# Patient Record
Sex: MALE | Race: WHITE | NOT HISPANIC OR LATINO | Employment: OTHER | URBAN - METROPOLITAN AREA
[De-identification: names, ages, dates, MRNs, and addresses within clinical notes are randomized per-mention and may not be internally consistent; named-entity substitution may affect disease eponyms.]

---

## 2017-01-04 ENCOUNTER — TELEPHONE (OUTPATIENT)
Dept: MEDICAL GROUP | Facility: PHYSICIAN GROUP | Age: 77
End: 2017-01-04

## 2017-01-04 NOTE — TELEPHONE ENCOUNTER
Requested medical records regarding colonoscopy from Dr Montesinos. This was faxed via RightFax on 1/4/2017 3:30 PM to: [phone]    Requested medical records regarding PCP records and Immunizations from [facility]. This was faxed via theScorex on 1/4/2017 3:33 PM to: [phone]

## 2017-01-04 NOTE — Clinical Note
[facility]  Nanci Manuel M.D.   [address]  [place] [address]  Fax: [phone]  Authorization for Release/Disclosure of Protected Health Information   Name: ERASTO MALIN : 1940 SSN: [id number]   Address: [address] [place] [address] Phone:    [phone] (home)    I authorize the entity listed below to release/disclose the PHI below to [facility]/Nanci Manuel M.D.    Provider or Entity Name:    Dr Montesinos   Address   [facility]   Phone:  [phone]    Fax:  [phone]   Reason for request: continuity of care   Information to be released:    [ XXX  ] LAST COLONOSCOPY, including any PATH REPORT [  ] LAST DEXA  [  ] LAST MAMMOGRAM  [  ] LAST PAP [  ] RETINA EXAM REPORT  [  ] IMMUNIZATION RECORDS  [  ] Release all info      [  ] Check here and initial the line next to each item to release ALL health information INCLUDING  _____ Care and treatment for drug and / or alcohol abuse  _____ HIV testing, infection status, or AIDS  _____ Genetic Testing    DATES OF SERVICE OR TIME PERIOD TO BE DISCLOSED: _____________  I understand and acknowledge that:  * This Authorization may be revoked at any time by you in writing, except if your health information has already been used or disclosed.  * Your health information that will be used or disclosed as a result of you signing this authorization could be re-disclosed by the recipient. If this occurs, your re-disclosed health information may no longer be protected by State or Federal laws.  * You may refuse to sign this Authorization. Your refusal will not affect your ability to obtain treatment.  * This Authorization becomes effective upon signing and will  on (date) __________. If no date is indicated, this Authorization will  one (1) year from the signature date.    Name: Erasto Malin    Signature: For continuity of care.      Date: 2017

## 2017-01-04 NOTE — Clinical Note
[facility]  Nanci Manuel M.D.   [address]  [place] [address]  Fax: [phone]  Authorization for Release/Disclosure of Protected Health Information   Name: ERASTO CALHOUN : 1940 SSN: [id number]   Address: [address] [place] [address] Phone:    [phone] (home)    I authorize the entity listed below to release/disclose the PHI below to [facility]/Nanci Manuel M.D.    Provider or Entity Name:    [facility]    Address   City, State, [facility]   Phone:  [phone]    Fax:  [phone]   Reason for request: continuity of care   Information to be released:    [  ] LAST COLONOSCOPY, including any PATH REPORT [  ] LAST DEXA  [  ] LAST MAMMOGRAM  [  ] LAST PAP [  ] RETINA EXAM REPORT  [ XXX ] IMMUNIZATION RECORDS  [ XXX ] Release all info      [  ] Check here and initial the line next to each item to release ALL health information INCLUDING  _____ Care and treatment for drug and / or alcohol abuse  _____ HIV testing, infection status, or AIDS  _____ Genetic Testing    DATES OF SERVICE OR TIME PERIOD TO BE DISCLOSED: _____________  I understand and acknowledge that:  * This Authorization may be revoked at any time by you in writing, except if your health information has already been used or disclosed.  * Your health information that will be used or disclosed as a result of you signing this authorization could be re-disclosed by the recipient. If this occurs, your re-disclosed health information may no longer be protected by State or Federal laws.  * You may refuse to sign this Authorization. Your refusal will not affect your ability to obtain treatment.  * This Authorization becomes effective upon signing and will  on (date) __________. If no date is indicated, this Authorization will  one (1) year from the signature date.    Name: Erasto Calhoun    Signature: For continuity of care.      Date: 2017

## 2017-01-09 ENCOUNTER — OFFICE VISIT (OUTPATIENT)
Dept: MEDICAL GROUP | Facility: PHYSICIAN GROUP | Age: 77
End: 2017-01-09
Payer: MEDICARE

## 2017-01-09 VITALS
OXYGEN SATURATION: 99 % | WEIGHT: 172 LBS | DIASTOLIC BLOOD PRESSURE: 80 MMHG | SYSTOLIC BLOOD PRESSURE: 130 MMHG | BODY MASS INDEX: 23.3 KG/M2 | HEART RATE: 63 BPM | RESPIRATION RATE: 16 BRPM | TEMPERATURE: 98.2 F | HEIGHT: 72 IN

## 2017-01-09 DIAGNOSIS — E78.5 HYPERLIPIDEMIA, UNSPECIFIED HYPERLIPIDEMIA TYPE: ICD-10-CM

## 2017-01-09 DIAGNOSIS — Z00.00 ROUTINE HEALTH MAINTENANCE: ICD-10-CM

## 2017-01-09 PROCEDURE — 99204 OFFICE O/P NEW MOD 45 MIN: CPT | Performed by: INTERNAL MEDICINE

## 2017-01-09 RX ORDER — PRAVASTATIN SODIUM 40 MG
40 TABLET ORAL NIGHTLY
COMMUNITY
End: 2017-01-09 | Stop reason: SDUPTHER

## 2017-01-09 RX ORDER — PRAVASTATIN SODIUM 40 MG
40 TABLET ORAL DAILY
Qty: 90 TAB | Refills: 3 | Status: SHIPPED | OUTPATIENT
Start: 2017-01-09 | End: 2018-01-09 | Stop reason: SDUPTHER

## 2017-01-09 ASSESSMENT — PATIENT HEALTH QUESTIONNAIRE - PHQ9: CLINICAL INTERPRETATION OF PHQ2 SCORE: 0

## 2017-01-09 NOTE — ASSESSMENT & PLAN NOTE
Chronic condition. He is taking medicine as directed. Current side effects: none. 10-yr  Last labs was done in January 2016 in [place].

## 2017-01-09 NOTE — ASSESSMENT & PLAN NOTE
Pneumonia vaccination, Pneumovax and Prevnar done  Shingles vaccination done  Colonoscopy 2015  Tetanus booster unknown

## 2017-01-09 NOTE — PROGRESS NOTES
Erasto Calhoun is a 76 y.o. male here to establish care  HPI:  Patient's previous primary care provider was Dr. Maldonado. This is his initial visit with me. Moved to [place] last September from [place].     Hyperlipidemia  Chronic condition. He is taking medicine as directed. Current side effects: none. 10-yr  Last labs was done in January 2016 in [place].     History of lupus  Patient reports history of lupus-had a rash that was biopsied in 2012 which turned out to be lupus. Patient followed dermatologist and was placed on Plaquenil which was discontinued last year. He has had no recurrence in symptoms. No family history.    Routine health maintenance  Pneumonia vaccination, Pneumovax and Prevnar done  Shingles vaccination done  Colonoscopy 2015  Tetanus booster unknown      Every 3 days exercise 50-20 min with home gym/rowing machine. Lost a few pounds, but recently moved and under stress. States he follows a generally healthy. Splurges every now and then. Eat at home mostly. Loves steak chicken pork.     Current medicines (including changes today)  Current Outpatient Prescriptions   Medication Sig Dispense Refill   • Ascorbic Acid (VITAMIN C PO) Take  by mouth.     • multivitamin (THERAGRAN) Tab Take 1 Tab by mouth every day.     • Calcium Carb-Cholecalciferol (CALCIUM 600 + D PO) Take  by mouth.     • pravastatin (PRAVACHOL) 40 MG tablet Take 1 Tab by mouth every day at 6 PM. 90 Tab 3     No current facility-administered medications for this visit.     He  has no past medical history on file.  He  has past surgical history that includes other surgical procedure (Right, 1972) and cast application.  Social History   Substance Use Topics   • Smoking status: Former Smoker -- 1.00 packs/day for 4 years     Types: Cigarettes     Quit date: 01/01/1962   • Smokeless tobacco: Never Used   • Alcohol Use: 0.6 oz/week     0 Standard drinks or equivalent, 1 Shots of liquor per week      Comment: occasionally, cocktail once a  week.     Social History     Social History Narrative   • No narrative on file     Family History   Problem Relation Age of Onset   • Cancer Mother      skin cancer   • Stroke Mother 92   • Diabetes Mother    • Hypertension Father    • Heart Disease Father 55     father passed from heart attack   • No Known Problems Brother    • No Known Problems Brother    • Cancer Brother      throat ca   • Alcohol/Drug Brother      heavy drinker     Family Status   Relation Status Death Age   • Mother  96   • Father  55     MI   • Brother Alive    • Maternal Grandmother  91   • Maternal Grandfather  96   • Paternal Grandmother     • Paternal Grandfather     • Brother Alive    • Brother  60     ROS  Constitutional: Negative for fever, chills, weight loss and malaise/fatigue.   HENT: Negative for ear pain, nosebleeds, congestion, sore throat and neck pain.    Eyes: Negative for blurred vision.   Respiratory: Negative for cough, sputum production, shortness of breath and wheezing.    Cardiovascular: Negative for chest pain, palpitations, orthopnea and leg swelling.   Gastrointestinal: Negative for heartburn, nausea, vomiting and abdominal pain.   Genitourinary: Negative for dysuria, urgency and frequency.   Musculoskeletal: Negative for myalgias, back pain and joint pain.   Skin: Negative for rash and itching.   Neurological: Negative for dizziness, tingling, tremors, sensory change, focal weakness and headaches.   Endo/Heme/Allergies: Does not bruise/bleed easily.   Psychiatric/Behavioral: Negative for depression, anxiety, or memory loss.     All other systems reviewed and are negative except as in HPI.     Objective:     Blood pressure 130/80, pulse 63, temperature 36.8 °C (98.2 °F), resp. rate 16, height 1.829 m (6'), weight 78.019 kg (172 lb), SpO2 99 %. Body mass index is 23.32 kg/(m^2).  Physical Exam:  Constitutional: Alert, no distress.  Skin: Warm, dry, good turgor, no  rashes in visible areas.  Eye: Equal, round and reactive, conjunctiva clear, lids normal.  ENMT: Lips without lesions, good dentition, oropharynx clear.  Neck: Trachea midline, no masses, no thyromegaly.  Respiratory: Unlabored respiratory effort, lungs clear to auscultation, no wheezes, no ronchi.  Cardiovascular: Normal S1, S2, no murmur, no edema.  Abdomen: Soft, non-tender, no masses, no hepatosplenomegaly.  Psych: Alert and oriented x3, normal affect and mood.    Assessment and Plan:   The following treatment plan was discussed   1. Hyperlipidemia, unspecified hyperlipidemia type  LIPID PROFILE    pravastatin (PRAVACHOL) 40 MG tablet   2. History of lupus     3. Routine health maintenance       1. Recheck fasting lipid panel.  2. No current issues  3. Await previous records for updating vaccination status and determining if additional labs required. Discussed annual wellness visits.    Records requested.  Followup: Return in about 1 year (around 1/9/2018) for Annual wellness visit.         I have worked with consultants from the vendor as well as technical experts from [employer] to optimize the interface. I have made every reasonable attempt to correct obvious errors, but I expect that there are errors of grammar and possibly content that I did not discover before finalizing the note.

## 2017-01-09 NOTE — MR AVS SNAPSHOT
Erasto Calhoun   2017 9:05 AM   Office Visit   MRN: [id number]    Department:  [facility]   Dept Phone:  [phone]    Description:  Male : 1940   Provider:  Nanci Manuel D.O.           Reason for Visit     Establish Care New Pt/ est care    Medication Refill Pravastatin      Allergies as of 2017     No Known Allergies      You were diagnosed with     Hyperlipidemia, unspecified hyperlipidemia type   [8771386]       History of lupus   [015107]       Routine health maintenance   [689202]         Vital Signs     Blood Pressure Pulse Temperature Respirations Height Weight    130/80 mmHg 63 36.8 °C (98.2 °F) 16 1.829 m (6') 78.019 kg (172 lb)    Body Mass Index Oxygen Saturation Smoking Status             23.32 kg/m2 99% Former Smoker         Basic Information     Date Of Birth Sex Race Ethnicity Preferred Language    1940 Male White Non- English      Problem List              ICD-10-CM Priority Class Noted - Resolved    Hyperlipidemia E78.5   2017 - Present    History of lupus Z87.39   2017 - Present    Routine health maintenance Z00.00   2017 - Present      Health Maintenance        Date Due Completion Dates    IMM DTaP/Tdap/Td Vaccine (1 - Tdap) 1958 ---    IMM ZOSTER VACCINE 1999 ---    IMM PNEUMOCOCCAL 65+ (ADULT) LOW/MEDIUM RISK SERIES (1 of 2 - PCV13) 2004 ---    IMM INFLUENZA (1) 2016 ---    COLONOSCOPY 2025            Current Immunizations     13-VALENT PCV PREVNAR 2015    Pneumococcal polysaccharide vaccine (PPSV-23) 2010    SHINGLES VACCINE 2013      Below and/or attached are the medications your provider expects you to take. Review all of your home medications and newly ordered medications with your provider and/or pharmacist. Follow medication instructions as directed by your provider and/or pharmacist. Please keep your medication list with you and share with your provider. Update the information when  medications are discontinued, doses are changed, or new medications (including over-the-counter products) are added; and carry medication information at all times in the event of emergency situations     Allergies:  No Known Allergies          Medications  Valid as of: January 09, 2017 -  9:59 AM    Generic Name Brand Name Tablet Size Instructions for use    Ascorbic Acid   Take  by mouth.        Calcium Carb-Cholecalciferol   Take  by mouth.        Multiple Vitamin (Tab) THERAGRAN  Take 1 Tab by mouth every day.        Pravastatin Sodium (Tab) PRAVACHOL 40 MG Take 1 Tab by mouth every day at 6 PM.        .                 Medicines prescribed today were sent to:     [facility] [address] [place] [address], [place] - [address] [place] [address]    Phone: [phone] Fax: [phone]    Open 24 Hours?: No      Medication refill instructions:       If your prescription bottle indicates you have medication refills left, it is not necessary to call your provider’s office. Please contact your pharmacy and they will refill your medication.    If your prescription bottle indicates you do not have any refills left, you may request refills at any time through one of the following ways: The [email or web address] system (except Urgent Care), by calling your provider’s office, or by asking your pharmacy to contact your provider’s office with a refill request. Medication refills are processed only during regular business hours and may not be available until the next business day. Your provider may request additional information or to have a follow-up visit with you prior to refilling your medication.   *Please Note: Medication refills are assigned a new Rx number when refilled electronically. Your pharmacy may indicate that no refills were authorized even though a new prescription for the same medication is available at the pharmacy. Please request the medicine by name with the pharmacy before contacting your  provider for a refill.        Your To Do List     Future Labs/Procedures Complete By Expires    LIPID PROFILE  As directed 1/10/2018         [employer] Access Code: [email or web address]  Expires: 2/8/2017  9:59 AM    Your email address is not on file at [facility].  Email Addresses are required for you to sign up for [employer], please contact [phone] to verify your personal information and to provide your email address prior to attempting to register for [employer].    Erasto Lj  [address] [place] [address]    [employer]  A secure, online tool to manage your health information     [facility]’s [employer]® is a secure, online tool that connects you to your personalized health information from the privacy of your home -- day or night - making it very easy for you to manage your healthcare. Once the activation process is completed, you can even access your medical information using the [employer] pushpa, which is available for free in the Apple Pushpa store or Google Play store.     To learn more about [employer], visit [email or web address]    There are two levels of access available (as shown below):   My Chart Features  [facility] Primary Care Doctor [facility]  Specialists [facility]  Urgent  Care Non-[facility] Primary Care Doctor   Email your healthcare team securely and privately 24/7 X X X    Manage appointments: schedule your next appointment; view details of past/upcoming appointments X      Request prescription refills. X      View recent personal medical records, including lab and immunizations X X X X   View health record, including health history, allergies, medications X X X X   Read reports about your outpatient visits, procedures, consult and ER notes X X X X   See your discharge summary, which is a recap of your hospital and/or ER visit that includes your diagnosis, lab results, and care plan X X  X     How to register for [employer]:  Once your e-mail address has been verified, follow the following steps to sign up for  [employer].     1. Go to  [email or web address]  2. Click on the Sign Up Now box, which takes you to the New Member Sign Up page. You will need to provide the following information:  a. Enter your [employer] Access Code exactly as it appears at the top of this page. (You will not need to use this code after you’ve completed the sign-up process. If you do not sign up before the expiration date, you must request a new code.)   b. Enter your date of birth.   c. Enter your home email address.   d. Click Submit, and follow the next screen’s instructions.  3. Create a Ar[employer] ID. This will be your [employer]ist login ID and cannot be changed, so think of one that is secure and easy to remember.  4. Create a Ar[employer] password. You can change your password at any time.  5. Enter your Password Reset Question and Answer. This can be used at a later time if you forget your password.   6. Enter your e-mail address. This allows you to receive e-mail notifications when new information is available in [employer].  7. Click Sign Up. You can now view your health information.    For assistance activating your [employer] account, call [phone]

## 2017-01-09 NOTE — ASSESSMENT & PLAN NOTE
Patient reports history of lupus-had a rash that was biopsied in 2012 which turned out to be lupus. Patient followed dermatologist and was placed on Plaquenil which was discontinued last year. He has had no recurrence in symptoms. No family history.

## 2017-01-10 ENCOUNTER — HOSPITAL ENCOUNTER (OUTPATIENT)
Dept: LAB | Facility: MEDICAL CENTER | Age: 77
End: 2017-01-10
Attending: INTERNAL MEDICINE
Payer: MEDICARE

## 2017-01-10 DIAGNOSIS — E78.5 HYPERLIPIDEMIA, UNSPECIFIED HYPERLIPIDEMIA TYPE: ICD-10-CM

## 2017-01-10 LAB
CHOLEST SERPL-MCNC: 180 MG/DL (ref 100–199)
HDLC SERPL-MCNC: 59 MG/DL
LDLC SERPL CALC-MCNC: 103 MG/DL
TRIGL SERPL-MCNC: 92 MG/DL (ref 0–149)

## 2017-01-10 PROCEDURE — 36415 COLL VENOUS BLD VENIPUNCTURE: CPT

## 2017-01-10 PROCEDURE — 80061 LIPID PANEL: CPT

## 2017-01-12 ENCOUNTER — TELEPHONE (OUTPATIENT)
Dept: MEDICAL GROUP | Facility: PHYSICIAN GROUP | Age: 77
End: 2017-01-12

## 2017-01-12 NOTE — TELEPHONE ENCOUNTER
----- Message from Nanci Manuel D.O. sent at 1/12/2017 10:41 AM PST -----  Cholesterol overall well controlled.

## 2017-09-13 ENCOUNTER — PATIENT OUTREACH (OUTPATIENT)
Dept: HEALTH INFORMATION MANAGEMENT | Facility: OTHER | Age: 77
End: 2017-09-13

## 2017-09-13 NOTE — PROGRESS NOTES
Outcome: Left Message /  with steph    Please transfer to Patient Outreach Team at [phone] when patient returns call.    WebIZ Checked & Epic Updated:  yes    HealthConnect Verified: yes    Attempt # 1

## 2017-09-22 NOTE — PROGRESS NOTES
Attempt #:4    WebIZ Checked & Epic Updated: Yes  · WebIZ Recommendations: FLU and TDAP  · Is patient due for Tdap? YES. Patient was not notified of copay.  · Is patient due for Shingles? NO  HealthConnect Verified: yes  Verify PCP: yes    Communication Preference Obtained: yes     Review Care Team: yes    Annual Wellness Visit Scheduling  1. Scheduling Status:Scheduled       Care Gap Scheduling (Attempt to Schedule EACH Overdue Care Gap!)     Health Maintenance Due   Topic Date Due   • Annual Wellness Visit  1940   • IMM DTaP/Tdap/Td Vaccine (1 - Tdap) 09/13/1958   • IMM INFLUENZA (1) 09/01/2017        Scheduled patient for Annual Wellness Visit and Immunizations: FLU and TDAP       [employer] Activation: declined  [employer] Pushpa: no  Virtual Visits: no  Opt In to Text Messages: no

## 2017-10-04 ENCOUNTER — OFFICE VISIT (OUTPATIENT)
Dept: MEDICAL GROUP | Facility: PHYSICIAN GROUP | Age: 77
End: 2017-10-04
Payer: MEDICARE

## 2017-10-04 VITALS
RESPIRATION RATE: 16 BRPM | OXYGEN SATURATION: 96 % | TEMPERATURE: 98.2 F | BODY MASS INDEX: 22.89 KG/M2 | HEIGHT: 72 IN | HEART RATE: 74 BPM | WEIGHT: 169 LBS | DIASTOLIC BLOOD PRESSURE: 74 MMHG | SYSTOLIC BLOOD PRESSURE: 122 MMHG

## 2017-10-04 DIAGNOSIS — E78.2 MIXED HYPERLIPIDEMIA: ICD-10-CM

## 2017-10-04 DIAGNOSIS — Z23 NEED FOR VACCINATION: ICD-10-CM

## 2017-10-04 PROBLEM — Z00.00 ROUTINE HEALTH MAINTENANCE: Status: RESOLVED | Noted: 2017-01-09 | Resolved: 2017-10-04

## 2017-10-04 PROCEDURE — G0008 ADMIN INFLUENZA VIRUS VAC: HCPCS | Performed by: FAMILY MEDICINE

## 2017-10-04 PROCEDURE — 99214 OFFICE O/P EST MOD 30 MIN: CPT | Mod: 25 | Performed by: FAMILY MEDICINE

## 2017-10-04 PROCEDURE — 90662 IIV NO PRSV INCREASED AG IM: CPT | Performed by: FAMILY MEDICINE

## 2017-10-04 NOTE — PROGRESS NOTES
Subjective:     Chief Complaint   Patient presents with   • Establish Care       Erasto Calhoun is a 77 y.o. male here today to [facility] care with new provider. Patient is a former patient of Dr. Manuel who has changed offices.    Hyperlipidemia: Patient is taking pravastatin daily without muscle aches and pains.     No Known Allergies  Current medicines (including changes today)  Current Outpatient Prescriptions   Medication Sig Dispense Refill   • Ascorbic Acid (VITAMIN C PO) Take  by mouth.     • multivitamin (THERAGRAN) Tab Take 1 Tab by mouth every day.     • Calcium Carb-Cholecalciferol (CALCIUM 600 + D PO) Take  by mouth.     • pravastatin (PRAVACHOL) 40 MG tablet Take 1 Tab by mouth every day at 6 PM. 90 Tab 3     No current facility-administered medications for this visit.      Social History   Substance Use Topics   • Smoking status: Former Smoker     Packs/day: 1.00     Years: 4.00     Types: Cigarettes     Quit date: 1962   • Smokeless tobacco: Never Used   • Alcohol use 0.6 oz/week     1 Shots of liquor per week      Comment: occasionally, cocktail once a week.     Family Status   Relation Status   • Mother  at age 96   • Father  at age 55    MI   • Brother Alive   • Maternal Grandmother  at age 91   • Maternal Grandfather  at age 96   • Paternal Grandmother    • Paternal Grandfather    • Brother Alive   • Brother  at age 60     Family History   Problem Relation Age of Onset   • Cancer Mother      skin cancer   • Stroke Mother 92   • Diabetes Mother    • Hypertension Father    • Heart Disease Father 55     father passed from heart attack   • No Known Problems Brother    • No Known Problems Brother    • Cancer Brother      throat ca   • Alcohol/Drug Brother      heavy drinker     He    has a past medical history of Lupus (systemic lupus erythematosus) (CMS-HCC).        ROS   GEN: no weight loss, fevers, or chills  HEENT: no blurry vision or change in  vision, no ear pain, no difficulty swallowing, no throat pain, no runny nose, no nasal congestion  Resp: no shortness of breath, no cough  CV: no racing heart, no irregular beats, no chest pain  Abd: no nausea, no vomiting, no diarrhea, no constipation, no blood in stool, no dark stools, no incontinence  : no dysuria, no hematuria, no urinary incontinence, no increased frequency  MSK: no muscle aches, no joint pain, no limited motion  Neuro: no headaches, no dizziness, no LOC, no weakness, no numbness/tingling       Objective:     Blood pressure 122/74, pulse 74, temperature 36.8 °C (98.2 °F), resp. rate 16, height 1.829 m (6'), weight 76.7 kg (169 lb), SpO2 96 %. Body mass index is 22.92 kg/m².   Physical Exam:  Constitutional: Alert, no distress.  Skin: Warm, dry, good turgor, no rashes in visible areas.  Eye: Equal, round and reactive, conjunctiva clear, lids normal.  ENMT: Lips without lesions, oropharynx non-erythematous, no exudate, moist oral mucosa, bilateral tympanic membranes: No bulging, no retraction, no fluid, nonerythematous, positive light reflex, external auditory canals: Clear, scant cerumen, nonerythematous  Neck: Trachea midline, no masses, no thyromegaly. No cervical or supraclavicular lymphadenopathy. Full ROM  Respiratory: Unlabored respiratory effort, good air movement, lungs clear to auscultation, no wheezes, no ronchi.  Cardiovascular:RRR, +S1, S2, no murmur, no peripheral edema, pedal and radial pulses equal and intact bilat  Abdomen: Soft, non-tender, no masses, no hepatosplenomegaly.  MSK:5/5 muscle strength in upper extremities as well as lower extremity bilaterally  Psych: Alert and oriented, appropriate affect and mood.  Neuro: CN2-12 intact, no gross motor or sensory deficits      Assessment and Plan:   The following treatment plan was discussed    1. Mixed hyperlipidemia  Chronic: well controlled  - COMP METABOLIC PANEL; Future  - LIPID PROFILE; Future    2. Need for vaccination  I  discussed benefits and side effects of each vaccine with patient, and I answered all patient's questions about vaccines.  - INFLUENZA VACCINE, HIGH DOSE (65+ ONLY)      Followup: Return for Annual.    Please note that this dictation was created using voice recognition software. I have made every reasonable attempt to correct obvious errors, but I expect that there are errors of grammar and possibly content that I did not discover before finalizing the note.

## 2017-10-12 ENCOUNTER — HOSPITAL ENCOUNTER (OUTPATIENT)
Dept: LAB | Facility: MEDICAL CENTER | Age: 77
End: 2017-10-12
Attending: FAMILY MEDICINE
Payer: MEDICARE

## 2017-10-12 DIAGNOSIS — E78.2 MIXED HYPERLIPIDEMIA: ICD-10-CM

## 2017-10-12 LAB
ALBUMIN SERPL BCP-MCNC: 4.1 G/DL (ref 3.2–4.9)
ALBUMIN/GLOB SERPL: 1.4 G/DL
ALP SERPL-CCNC: 60 U/L (ref 30–99)
ALT SERPL-CCNC: 15 U/L (ref 2–50)
ANION GAP SERPL CALC-SCNC: 9 MMOL/L (ref 0–11.9)
AST SERPL-CCNC: 18 U/L (ref 12–45)
BILIRUB SERPL-MCNC: 2.3 MG/DL (ref 0.1–1.5)
BUN SERPL-MCNC: 26 MG/DL (ref 8–22)
CALCIUM SERPL-MCNC: 9.9 MG/DL (ref 8.5–10.5)
CHLORIDE SERPL-SCNC: 104 MMOL/L (ref 96–112)
CHOLEST SERPL-MCNC: 172 MG/DL (ref 100–199)
CO2 SERPL-SCNC: 25 MMOL/L (ref 20–33)
CREAT SERPL-MCNC: 0.79 MG/DL (ref 0.5–1.4)
GFR SERPL CREATININE-BSD FRML MDRD: >60 ML/MIN/1.73 M 2
GLOBULIN SER CALC-MCNC: 2.9 G/DL (ref 1.9–3.5)
GLUCOSE SERPL-MCNC: 92 MG/DL (ref 65–99)
HDLC SERPL-MCNC: 45 MG/DL
LDLC SERPL CALC-MCNC: 98 MG/DL
POTASSIUM SERPL-SCNC: 3.9 MMOL/L (ref 3.6–5.5)
PROT SERPL-MCNC: 7 G/DL (ref 6–8.2)
SODIUM SERPL-SCNC: 138 MMOL/L (ref 135–145)
TRIGL SERPL-MCNC: 145 MG/DL (ref 0–149)

## 2017-10-12 PROCEDURE — 80053 COMPREHEN METABOLIC PANEL: CPT

## 2017-10-12 PROCEDURE — 80061 LIPID PANEL: CPT

## 2017-10-12 PROCEDURE — 36415 COLL VENOUS BLD VENIPUNCTURE: CPT

## 2017-10-17 ENCOUNTER — OFFICE VISIT (OUTPATIENT)
Dept: MEDICAL GROUP | Facility: PHYSICIAN GROUP | Age: 77
End: 2017-10-17
Payer: MEDICARE

## 2017-10-17 VITALS
HEIGHT: 72 IN | OXYGEN SATURATION: 96 % | BODY MASS INDEX: 22.75 KG/M2 | WEIGHT: 168 LBS | RESPIRATION RATE: 16 BRPM | HEART RATE: 76 BPM | TEMPERATURE: 98.6 F | DIASTOLIC BLOOD PRESSURE: 72 MMHG | SYSTOLIC BLOOD PRESSURE: 118 MMHG

## 2017-10-17 DIAGNOSIS — Z00.00 MEDICARE ANNUAL WELLNESS VISIT, SUBSEQUENT: ICD-10-CM

## 2017-10-17 DIAGNOSIS — E78.2 MIXED HYPERLIPIDEMIA: ICD-10-CM

## 2017-10-17 DIAGNOSIS — E80.6 HYPERBILIRUBINEMIA: ICD-10-CM

## 2017-10-17 PROCEDURE — G0439 PPPS, SUBSEQ VISIT: HCPCS | Performed by: FAMILY MEDICINE

## 2017-10-17 ASSESSMENT — PATIENT HEALTH QUESTIONNAIRE - PHQ9: CLINICAL INTERPRETATION OF PHQ2 SCORE: 0

## 2017-10-17 NOTE — PROGRESS NOTES
Chief Complaint   Patient presents with   • Annual Wellness Visit         HPI:  Erasto Calhoun is a 77 y.o. here for Medicare Annual Wellness Visit     Patient Active Problem List    Diagnosis Date Noted   • Hyperlipidemia 01/09/2017       Current Outpatient Prescriptions   Medication Sig Dispense Refill   • Ascorbic Acid (VITAMIN C PO) Take  by mouth.     • multivitamin (THERAGRAN) Tab Take 1 Tab by mouth every day.     • Calcium Carb-Cholecalciferol (CALCIUM 600 + D PO) Take  by mouth.     • pravastatin (PRAVACHOL) 40 MG tablet Take 1 Tab by mouth every day at 6 PM. 90 Tab 3     No current facility-administered medications for this visit.             Current supplements as per medication list.       Allergies: Review of patient's allergies indicates no known allergies.    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     He  reports that he quit smoking about 55 years ago. His smoking use included Cigarettes. He has a 4.00 pack-year smoking history. He has never used smokeless tobacco. He reports that he drinks about 0.6 oz of alcohol per week . He reports that he does not use drugs.  Counseling given: Not Answered        DPA/Advanced Directive:  Patient does not have an Advanced Directive.  A packet and workshop information was given on Advanced Directives.      ROS:    Gait: Uses no assistive device   Ostomy: no   Other tubes: no   Amputations: no   Chronic oxygen use: no   Last eye exam:2 years ago.   : Denies incontinence.       Depression Screening    Little interest or pleasure in doing things?  0 - not at all  Feeling down, depressed , or hopeless? 0 - not at all  Patient Health Questionnaire Score: 0     If depressive symptoms identified deferred to follow up visit unless specifically addressed in assessment and plan.    Interpretation of PHQ-9 Total Score   Score Severity   1-4 No Depression   5-9 Mild Depression   10-14 Moderate Depression   15-19 Moderately Severe  Depression   20-27 Severe Depression    Screening for Cognitive Impairment    Three Minute Recall (apple, watch, radha)  3/3    Draw clock face with all 12 numbers set to the hand to show 10 minutes past 11 o'clock  1 5/5  Cognitive concerns identified deferred for follow up unless specifically addressed in assessment and plan.    Fall Risk Assessment    Has the patient had two or more falls in the last year or any fall with injury in the last year?  No    Safety Assessment    Throw rugs on floor.  Yes  Handrails on all stairs.  Yes  Good lighting in all hallways.  Yes  Difficulty hearing.  No  Patient counseled about all safety risks that were identified.    Functional Assessment ADLs    Are there any barriers preventing you from cooking for yourself or meeting nutritional needs?  No.    Are there any barriers preventing you from driving safely or obtaining transportation?  No.    Are there any barriers preventing you from using a telephone or calling for help?  No.    Are there any barriers preventing you from shopping?  No.    Are there any barriers preventing you from taking care of your own finances?  No.    Are there any barriers preventing you from managing your medications?  No.    Are currently engaging any exercise or physical activity?  No.       Health Maintenance Summary                Annual Wellness Visit Overdue 1940     IMM DTaP/Tdap/Td Vaccine Overdue 9/13/1958     COLONOSCOPY Next Due 6/18/2025      Done 6/18/2015 REFERRAL TO GI FOR COLONOSCOPY          Patient Care Team:  Aida Ferrer D.O. as PCP - General (Family Medicine)      Social History   Substance Use Topics   • Smoking status: Former Smoker     Packs/day: 1.00     Years: 4.00     Types: Cigarettes     Quit date: 1/1/1962   • Smokeless tobacco: Never Used   • Alcohol use 0.6 oz/week     1 Shots of liquor per week      Comment: occasionally, cocktail once a week.     Family History   Problem Relation Age of Onset   • Cancer  Mother      skin cancer   • Stroke Mother 92   • Diabetes Mother    • Hypertension Father    • Heart Disease Father 55     father passed from heart attack   • No Known Problems Brother    • No Known Problems Brother    • Cancer Brother      throat ca   • Alcohol/Drug Brother      heavy drinker     He  has a past medical history of Lupus (systemic lupus erythematosus) (CMS-HCC).   Past Surgical History:   Procedure Laterality Date   • OTHER SURGICAL PROCEDURE Right 1972    right heel injury   • CAST APPLICATION      climbing, collar bone injury (7th grade)       Exam:     Blood pressure 118/72, pulse 76, temperature 37 °C (98.6 °F), resp. rate 16, height 1.829 m (6'), weight 76.2 kg (168 lb), SpO2 96 %. Body mass index is 22.78 kg/m².    Hearing excellent.    Dentition upper and lower dentures  Alert, oriented in no acute distress.  Eye contact is good, speech goal directed, affect calm      Assessment and Plan. The following treatment and monitoring plan is recommended:    1. Medicare annual wellness visit, subsequent     2. Mixed hyperlipidemia     3. Hyperbilirubinemia  BILIRUBIN DIRECT    COMP METABOLIC PANEL     Repeat Bili in 4 weeks.   Services suggested: No services needed at this time  Health Care Screening: Age-appropriate preventive services Medicare covers discussed today and ordered if indicated.  Referrals offered: Community-based lifestyle interventions to reduce health risks and promote self-management and wellness, fall prevention, nutrition, physical activity, tobacco-use cessation, weight loss, and mental health services as per orders if indicated.    Discussion today about general wellness and lifestyle habits:    · Prevent falls and reduce trip hazards; Cautioned about securing or removing rugs.  · Have a working fire alarm and carbon monoxide detector;   · Engage in regular physical activity and social activities       Follow-up: Return for Annual.

## 2017-12-14 ENCOUNTER — HOSPITAL ENCOUNTER (OUTPATIENT)
Dept: LAB | Facility: MEDICAL CENTER | Age: 77
End: 2017-12-14
Attending: FAMILY MEDICINE
Payer: MEDICARE

## 2017-12-14 DIAGNOSIS — E80.6 HYPERBILIRUBINEMIA: ICD-10-CM

## 2017-12-14 LAB
ALBUMIN SERPL BCP-MCNC: 4.1 G/DL (ref 3.2–4.9)
ALBUMIN/GLOB SERPL: 1.5 G/DL
ALP SERPL-CCNC: 68 U/L (ref 30–99)
ALT SERPL-CCNC: 16 U/L (ref 2–50)
ANION GAP SERPL CALC-SCNC: 5 MMOL/L (ref 0–11.9)
AST SERPL-CCNC: 22 U/L (ref 12–45)
BILIRUB CONJ SERPL-MCNC: 0.6 MG/DL (ref 0.1–0.5)
BILIRUB INDIRECT SERPL-MCNC: 1.5 MG/DL (ref 0–1)
BILIRUB SERPL-MCNC: 2.1 MG/DL (ref 0.1–1.5)
BUN SERPL-MCNC: 18 MG/DL (ref 8–22)
CALCIUM SERPL-MCNC: 9.6 MG/DL (ref 8.5–10.5)
CHLORIDE SERPL-SCNC: 105 MMOL/L (ref 96–112)
CO2 SERPL-SCNC: 28 MMOL/L (ref 20–33)
CREAT SERPL-MCNC: 0.84 MG/DL (ref 0.5–1.4)
GFR SERPL CREATININE-BSD FRML MDRD: >60 ML/MIN/1.73 M 2
GLOBULIN SER CALC-MCNC: 2.7 G/DL (ref 1.9–3.5)
GLUCOSE SERPL-MCNC: 78 MG/DL (ref 65–99)
POTASSIUM SERPL-SCNC: 4 MMOL/L (ref 3.6–5.5)
PROT SERPL-MCNC: 6.8 G/DL (ref 6–8.2)
SODIUM SERPL-SCNC: 138 MMOL/L (ref 135–145)

## 2017-12-14 PROCEDURE — 80053 COMPREHEN METABOLIC PANEL: CPT

## 2017-12-14 PROCEDURE — 36415 COLL VENOUS BLD VENIPUNCTURE: CPT

## 2017-12-14 PROCEDURE — 82248 BILIRUBIN DIRECT: CPT

## 2017-12-15 DIAGNOSIS — E80.6 INDIRECT HYPERBILIRUBINEMIA: ICD-10-CM

## 2017-12-18 ENCOUNTER — TELEPHONE (OUTPATIENT)
Dept: MEDICAL GROUP | Facility: PHYSICIAN GROUP | Age: 77
End: 2017-12-18

## 2017-12-18 NOTE — TELEPHONE ENCOUNTER
----- Message from Aida Ferrer D.O. sent at 12/15/2017  6:40 PM PST -----  Please call pt to inform them that the results from recent testing in your to show elevated bilirubin. I recommend follow-up with a gastroenterologist. Referral has been created.  -Dr. Thurman

## 2018-01-09 ENCOUNTER — OFFICE VISIT (OUTPATIENT)
Dept: MEDICAL GROUP | Facility: PHYSICIAN GROUP | Age: 78
End: 2018-01-09
Payer: MEDICARE

## 2018-01-09 VITALS
HEART RATE: 87 BPM | TEMPERATURE: 97.8 F | BODY MASS INDEX: 22.48 KG/M2 | SYSTOLIC BLOOD PRESSURE: 140 MMHG | WEIGHT: 166 LBS | DIASTOLIC BLOOD PRESSURE: 88 MMHG | OXYGEN SATURATION: 97 % | HEIGHT: 72 IN

## 2018-01-09 DIAGNOSIS — F43.21 GRIEF REACTION: ICD-10-CM

## 2018-01-09 DIAGNOSIS — E78.2 MIXED HYPERLIPIDEMIA: ICD-10-CM

## 2018-01-09 DIAGNOSIS — E80.6 INDIRECT HYPERBILIRUBINEMIA: ICD-10-CM

## 2018-01-09 PROCEDURE — 99214 OFFICE O/P EST MOD 30 MIN: CPT | Performed by: FAMILY MEDICINE

## 2018-01-09 RX ORDER — PRAVASTATIN SODIUM 40 MG
40 TABLET ORAL DAILY
Qty: 90 TAB | Refills: 3 | Status: SHIPPED | OUTPATIENT
Start: 2018-01-09 | End: 2019-03-26 | Stop reason: SDUPTHER

## 2018-01-09 NOTE — PROGRESS NOTES
Subjective:     Chief Complaint   Patient presents with   • Results     lab results       Erasto Calhoun Jr. is a 77 y.o. male here today for elevated bilirubin.Patient was noted to have elevated bilirubin in October and again in December. Patient denies abdominal pain, nausea, vomiting, diarrhea, abdominal bloating, or yellowing of skin.  Patient has hyperlipidemia. He continues to take pravastatin daily without muscle aches or pains.      Patient's wife passed away recently due to necrotizing fasciitis. Patient reports good social support. He reports somewhat depressed mood but is dealing with loss well    No Known Allergies  Current medicines (including changes today)  Current Outpatient Prescriptions   Medication Sig Dispense Refill   • pravastatin (PRAVACHOL) 40 MG tablet Take 1 Tab by mouth every day at 6 PM. 90 Tab 3   • Ascorbic Acid (VITAMIN C PO) Take  by mouth.     • multivitamin (THERAGRAN) Tab Take 1 Tab by mouth every day.     • Calcium Carb-Cholecalciferol (CALCIUM 600 + D PO) Take  by mouth.       No current facility-administered medications for this visit.      Social History   Substance Use Topics   • Smoking status: Former Smoker     Packs/day: 1.00     Years: 4.00     Types: Cigarettes     Quit date: 1962   • Smokeless tobacco: Never Used   • Alcohol use 0.6 oz/week     1 Shots of liquor per week      Comment: occasionally, cocktail once a week.     Family Status   Relation Status   • Mother  at age 96   • Father  at age 55    MI   • Brother Alive   • Maternal Grandmother  at age 91   • Maternal Grandfather  at age 96   • Paternal Grandmother    • Paternal Grandfather    • Brother Alive   • Brother  at age 60     Family History   Problem Relation Age of Onset   • Cancer Mother      skin cancer   • Stroke Mother 92   • Diabetes Mother    • Hypertension Father    • Heart Disease Father 55     father passed from heart attack   • No  Known Problems Brother    • No Known Problems Brother    • Cancer Brother      throat ca   • Alcohol/Drug Brother      heavy drinker     He    has a past medical history of Lupus (systemic lupus erythematosus) (CMS-HCC).        ROS   GEN: no weight loss, fevers, or chills  Resp: no shortness of breath, no cough  CV: no racing heart, no irregular beats, no chest pain  Abd: no nausea, no vomiting, no diarrhea, no constipation, no blood in stool, no dark stools, no incontinence  MSK: no muscle aches, no joint pain, no limited motion  Neuro: no headaches, no dizziness, no LOC, no weakness, no numbness/tingling       Objective:     Blood pressure 140/88, pulse 87, temperature 36.6 °C (97.8 °F), height 1.829 m (6'), weight 75.3 kg (166 lb), SpO2 97 %. Body mass index is 22.51 kg/m².   Physical Exam:  Constitutional: Alert, no distress.  Skin: Warm, dry, good turgor, no rashes in visible areas.  Eye: Equal, round and reactive, conjunctiva clear, lids normal.  ENMT: Lips without lesions, dentures in place, oropharynx non-erythematous, no exudate, moist oral mucosa,  Neck: Trachea midline, no masses, no thyromegaly. No cervical or supraclavicular lymphadenopathy. Full ROM  Respiratory: Unlabored respiratory effort, good air movement, lungs clear to auscultation, no wheezes, no ronchi.  Cardiovascular:RRR, +S1, S2, no murmur, no peripheral edema, pedal and radial pulses equal and intact bilat  Abdomen: Soft, non-tender, no masses, no hepatosplenomegaly, positive bowel sounds in all 4 quadrants, negative Coleman's sign.  Psych: Alert and oriented, appropriate affect and mood, tearful when discussing my staff.  Neuro: no gross motor or sensory deficits      Assessment and Plan:   The following treatment plan was discussed  1. Indirect hyperbilirubinemia  Slight elevation in direct, elevation and indirect, uncertain etiology. Recommend follow-up with GI.    2. Mixed hyperlipidemia  The 10-year ASCVD risk score (Laisha LEONA Jr., et  al., 2013) is: 33.3%    Values used to calculate the score:      Age: 77 years      Sex: Male      Is Non- : No      Diabetic: No      Tobacco smoker: No      Systolic Blood Pressure: 140 mmHg      Is BP treated: No      HDL Cholesterol: 45 mg/dL      Total Cholesterol: 172 mg/dL    - pravastatin (PRAVACHOL) 40 MG tablet; Take 1 Tab by mouth every day at 6 PM.  Dispense: 90 Tab; Refill: 3    3. Grief reaction  Counseling offered. Patient declined. Patient has good social support from children.          Followup: Return in about 6 months (around 7/9/2018) for chronic conditions with Azul Cash.    Please note that this dictation was created using voice recognition software. I have made every reasonable attempt to correct obvious errors, but I expect that there are errors of grammar and possibly content that I did not discover before finalizing the note.

## 2018-05-15 ENCOUNTER — TELEPHONE (OUTPATIENT)
Dept: MEDICAL GROUP | Facility: PHYSICIAN GROUP | Age: 78
End: 2018-05-15

## 2018-05-15 NOTE — TELEPHONE ENCOUNTER
Future Appointments       Provider Department Center    5/16/2018 11:05 AM Azul Cash P.A.-C. [facility]        ESTABLISHED PATIENT PRE-VISIT PLANNING     Note: Patient will not be contacted if there is no indication to call.     1.  Reviewed notes from the last few office visits within the medical group: Yes    2.  If any orders were placed at last visit or intended to be done for this visit (i.e. 6 mos follow-up), do we have Results/Consult Notes?        •  Labs - Labs were not ordered at last office visit.       •  Imaging - Imaging was not ordered at last office visit.       •  Referrals - No referrals were ordered at last office visit.    3. Is this appointment scheduled as a Hospital Follow-Up? No    4.  Immunizations were updated in Epic using WebIZ?: Yes       •  Web Iz Recommendations: TDAP    5.  Patient is due for the following Health Maintenance Topics:   Health Maintenance Due   Topic Date Due   • IMM DTaP/Tdap/Td Vaccine (1 - Tdap) 09/13/1958       6.  MDX printed for Provider? YES    7.  Patient was informed to arrive 15 min prior to their scheduled appointment and bring in their medication bottles. ANTONIA

## 2018-05-16 ENCOUNTER — OFFICE VISIT (OUTPATIENT)
Dept: MEDICAL GROUP | Facility: PHYSICIAN GROUP | Age: 78
End: 2018-05-16
Payer: MEDICARE

## 2018-05-16 VITALS
SYSTOLIC BLOOD PRESSURE: 148 MMHG | DIASTOLIC BLOOD PRESSURE: 82 MMHG | HEART RATE: 70 BPM | HEIGHT: 72 IN | WEIGHT: 171 LBS | OXYGEN SATURATION: 96 % | TEMPERATURE: 98.2 F | BODY MASS INDEX: 23.16 KG/M2

## 2018-05-16 DIAGNOSIS — E80.6 INDIRECT HYPERBILIRUBINEMIA: ICD-10-CM

## 2018-05-16 DIAGNOSIS — E78.2 MIXED HYPERLIPIDEMIA: ICD-10-CM

## 2018-05-16 PROCEDURE — 99213 OFFICE O/P EST LOW 20 MIN: CPT | Performed by: PHYSICIAN ASSISTANT

## 2018-05-16 NOTE — ASSESSMENT & PLAN NOTE
Chronic issue for many years. Has done well on pravastatin 40 mg daily. Denies concerns. Last lipid panel in 10/2017.

## 2018-05-16 NOTE — ASSESSMENT & PLAN NOTE
Has had mildly elevated bilirubin levels on last 2 sets of labs. Previous PCP referred him to GI. Patient states he went and was told that levels were nothing to worry about given only mild elevation. No further testing was done. Patient continues to deny any abdominal pain, nausea/vomiting, or yellowing of the skin.

## 2018-05-16 NOTE — PROGRESS NOTES
Subjective:   Erasto Calhoun Jr. is a 77 y.o. male here today for follow-up on hyperlipidemia and hyperbilirubinemia. Is a new patient to me and is also establishing care today.    Previous PCP: Aida Thurman DO    HPI: Patient has the following current medical problems:    Hyperlipidemia  Chronic issue for many years. Has done well on pravastatin 40 mg daily. Denies concerns. Last lipid panel in 10/2017.    Indirect hyperbilirubinemia  Has had mildly elevated bilirubin levels on last 2 sets of labs. Previous PCP referred him to GI. Patient states he went and was told that levels were nothing to worry about given only mild elevation. No further testing was done. Patient continues to deny any abdominal pain, nausea/vomiting, or yellowing of the skin.       Current medicines (including changes today)  Current Outpatient Prescriptions   Medication Sig Dispense Refill   • pravastatin (PRAVACHOL) 40 MG tablet Take 1 Tab by mouth every day at 6 PM. 90 Tab 3   • Ascorbic Acid (VITAMIN C PO) Take  by mouth.     • multivitamin (THERAGRAN) Tab Take 1 Tab by mouth every day.     • Calcium Carb-Cholecalciferol (CALCIUM 600 + D PO) Take  by mouth.       No current facility-administered medications for this visit.      He  has a past medical history of Lupus (systemic lupus erythematosus) (HCC).    ROS  As per HPI.       Objective:     Blood pressure 148/82, pulse 70, temperature 36.8 °C (98.2 °F), height 1.829 m (6'), weight 77.6 kg (171 lb), SpO2 96 %. Body mass index is 23.19 kg/m².     Physical Exam:  Constitutional: Alert, well-appearing, no distress.  Skin: Warm, dry, good turgor, no rashes in visible areas. No jaundice.  Eye: Conjunctiva clear, lids normal.  ENMT: Lips without lesions, moist mucus membranes.      Assessment and Plan:   The following treatment plan was discussed    1. Mixed hyperlipidemia  Chronic issue, well-controlled on daily statin. Last lipid panel from 10/2017 reviewed which was normal.  Advised to continue pravastatin at current dose.     Cholesterol,Tot 172  100 - 199 mg/dL Final   Triglycerides 145  0 - 149 mg/dL Final   HDL 45  >=40 mg/dL Final   LDL 98  <100 mg/dL Final       2. Indirect hyperbilirubinemia  Chronic issue, unclear etiology but has been cleared by GI. Continues to be asymptomatic. Will continue to periodically monitor labs.      Followup: Return in about 5 months (around 10/16/2018) for Medicare AWV.    Azul Cash P.A.-C.

## 2018-06-07 NOTE — PROGRESS NOTES
Outcome: Left Message    Please transfer to Patient Outreach Team at [phone] when patient returns call.    HealthConnect Verified: yes    Attempt # 1

## 2018-06-08 NOTE — PROGRESS NOTES
1. Attempt #: 1    2. HealthConnect Verified: yes    3. Verify PCP: yes    4. Care Team Updated:       •   DME Company (gait device, O2, CPAP, etc.): N\A       •   Other Specialists (eye doctor, derm, GYN, cardiology, endo, etc): N\A    5.  Reviewed/Updated the following with patient:       •   Communication Preference Obtained? YES       •   Preferred Pharmacy? NO       •   Preferred Lab? NO       •   Family History (document living status of immediate family members and if + hx of cancer, diabetes, hypertension, hyperlipidemia, heart attack, stroke) NO    6. Blockboard Activation: declined    7. Blockboard Pushpa: no    8. Annual Wellness Visit Scheduling  Scheduling Status:Not Scheduled. Patient states they are not interested - Will call back when ready to schedule       9. Care Gap Scheduling (Attempt to Schedule EACH Overdue Care Gap!)     Health Maintenance Due   Topic Date Due   • IMM DTaP/Tdap/Td Vaccine (1 - Tdap) 09[birth year]13/1958        Patient declined  Annual Wellness Visit    10. Patient was NOT advised: “This is a free wellness visit. The provider will screen for medical conditions to help you stay healthy. If you have other concerns to address you may be asked to discuss these at a separate visit or there may be an additional fee.”     11. Patient was NOT informed to arrive 15 min prior to their scheduled appointment and bring in their medication bottles.

## 2018-10-08 ENCOUNTER — PATIENT OUTREACH (OUTPATIENT)
Dept: HEALTH INFORMATION MANAGEMENT | Facility: OTHER | Age: 78
End: 2018-10-08

## 2018-10-08 NOTE — PROGRESS NOTES
1. Attempt #:1    2. HealthConnect Verified: no    3. Verify PCP: yes    4. Review Care Team: yes    5. WebIZ Checked & Epic Updated: Yes  · WebIZ Recommendations: FLU, TDAP and SHINGRIX (Shingles)  · Is patient due for Tdap? YES. Patient was not notified of copay/out of pocket cost.  · Is patient due for Shingles? YES. Patient was not notified of copay/out of pocket cost.    6. Reviewed/Updated the following with patient:       •   Communication Preference Obtained? YES       •   Preferred Pharmacy? YES       •   Preferred Lab? YES       •   Family History (document living status of immediate family members and if + hx of cancer, diabetes, hypertension, hyperlipidemia, heart attack, stroke) YES. Was Abstract Encounter opened and chart updated? YES    7. Annual Wellness Visit Scheduling  · Scheduling Status:Scheduled     9. Care Gap Scheduling (Attempt to Schedule EACH Overdue Care Gap!)     Health Maintenance Due   Topic Date Due   • IMM DTaP/Tdap/Td Vaccine (1 - Tdap) 09[birth year]13/1959   • IMM ZOSTER VACCINES (2 of 3) 03/06/2013   • IMM INFLUENZA (1) 09/01/2018        Scheduled patient for Annual Wellness Visit     10. REHAPP Activation: declined    11. REHAPP Pushpa: no    12. Virtual Visits: no    13. Opt In to Text Messages: no    14. Patient was NOT advised: “This is a free wellness visit. The provider will screen for medical conditions to help you stay healthy. If you have other concerns to address you may be asked to discuss these at a separate visit or there may be an additional fee.”     15. Patient was informed to arrive 15 min prior to their scheduled appointment and bring in their medication bottles.

## 2018-10-22 ENCOUNTER — TELEPHONE (OUTPATIENT)
Dept: MEDICAL GROUP | Facility: PHYSICIAN GROUP | Age: 78
End: 2018-10-22

## 2018-10-22 NOTE — TELEPHONE ENCOUNTER
Future Appointments       Provider Department Center    10/30/2018 10:30 AM Azul Cash P.A.-C.; [facility]        ANNUAL WELLNESS VISIT PRE-VISIT PLANNING WITHOUT OUTREACH    1.  Reviewed note from last office visit with PCP: YES    2.  If any orders were placed at last visit, do we have Results/Consult Notes?        •  Labs - Labs were not ordered at last office visit..       •  Imaging - Imaging was not ordered at last office visit.       •  Referrals - No referrals were ordered at last office visit.    3.  Immunizations were updated in [facility] using WebIZ?: Yes       •  WebIZ Recommendations: FLU, TDAP and SHINGRIX (Shingles)        •  Is patient due for Tdap? YES. Patient was not notified of copay/out of pocket cost.       •  Is patient due for Shingles? YES. Patient was not notified of copay/out of pocket cost. not currently available in clinic     4.  Patient is due for the following Health Maintenance Topics:   Health Maintenance Due   Topic Date Due   • IMM DTaP/Tdap/Td Vaccine (1 - Tdap) 09[birth year]13/1959   • IMM ZOSTER VACCINES (2 of 3) 03/06/2013   • IMM INFLUENZA (1) 09/01/2018   • Annual Wellness Visit  10/18/2018       5.  Reviewed/Updated the following with patient:       •   Preferred Pharmacy? YES       •   Preferred Lab? YES       •   Preferred Communication? YES       •   Allergies? YES       •   Medications? YES. Was Abstract Encounter opened and chart updated? YES       •   Social History? YES. Was Abstract Encounter opened and chart updated? YES       •   Family History (document living status of immediate family members and if + hx of  cancer, diabetes, hypertension, hyperlipidemia, heart attack, stroke) YES. Was Abstract Encounter opened and chart updated? YES    6.  Care Team Updated:       •   DME Company (gait device, O2, CPAP, etc.): YES       •   Other Specialists (eye doctor, derm, GYN, cardiology, endo, etc): YES    7.  Patient has  the following Care Path diagnoses on Problem List:  NONE    8.  MDX printed and highlighted for Provider? NO compliant during visit 05/16/18    9.  Patient was advised: “This is a free wellness visit. The provider will screen for medical conditions to help you stay healthy. If you have other concerns to address you may be asked to discuss these at a separate visit or there may be an additional fee.”     10.  Patient was informed to arrive 15 min prior to their scheduled appointment and bring in their medication bottles.

## 2018-10-30 ENCOUNTER — OFFICE VISIT (OUTPATIENT)
Dept: MEDICAL GROUP | Facility: PHYSICIAN GROUP | Age: 78
End: 2018-10-30
Payer: MEDICARE

## 2018-10-30 VITALS
HEIGHT: 72 IN | HEART RATE: 56 BPM | OXYGEN SATURATION: 96 % | DIASTOLIC BLOOD PRESSURE: 90 MMHG | WEIGHT: 170 LBS | TEMPERATURE: 97.2 F | SYSTOLIC BLOOD PRESSURE: 140 MMHG | BODY MASS INDEX: 23.03 KG/M2

## 2018-10-30 DIAGNOSIS — Z23 NEED FOR VACCINATION: ICD-10-CM

## 2018-10-30 DIAGNOSIS — E78.2 MIXED HYPERLIPIDEMIA: ICD-10-CM

## 2018-10-30 DIAGNOSIS — Z00.00 MEDICARE ANNUAL WELLNESS VISIT, SUBSEQUENT: ICD-10-CM

## 2018-10-30 DIAGNOSIS — E80.6 INDIRECT HYPERBILIRUBINEMIA: ICD-10-CM

## 2018-10-30 PROCEDURE — 90662 IIV NO PRSV INCREASED AG IM: CPT | Performed by: PHYSICIAN ASSISTANT

## 2018-10-30 PROCEDURE — 90715 TDAP VACCINE 7 YRS/> IM: CPT | Performed by: PHYSICIAN ASSISTANT

## 2018-10-30 PROCEDURE — 90472 IMMUNIZATION ADMIN EACH ADD: CPT | Performed by: PHYSICIAN ASSISTANT

## 2018-10-30 PROCEDURE — G0439 PPPS, SUBSEQ VISIT: HCPCS | Performed by: PHYSICIAN ASSISTANT

## 2018-10-30 PROCEDURE — G0008 ADMIN INFLUENZA VIRUS VAC: HCPCS | Performed by: PHYSICIAN ASSISTANT

## 2018-10-30 ASSESSMENT — ACTIVITIES OF DAILY LIVING (ADL): BATHING_REQUIRES_ASSISTANCE: 0

## 2018-10-30 ASSESSMENT — PATIENT HEALTH QUESTIONNAIRE - PHQ9: CLINICAL INTERPRETATION OF PHQ2 SCORE: 0

## 2018-10-30 ASSESSMENT — ENCOUNTER SYMPTOMS: GENERAL WELL-BEING: GOOD

## 2018-10-30 NOTE — ASSESSMENT & PLAN NOTE
Chronic issue, stable. Has chronic mild elevation of indirect bilirubin. Previously evaluated by GI who recommended periodic monitoring. Last CMP in 12/2017. Due for re-check which I have ordered.

## 2018-10-30 NOTE — ASSESSMENT & PLAN NOTE
Chronic issue, well-controlled on daily pravastatin which he continues to tolerate well. Last lipid panel from 10/2017 reviewed and normal. Continue current management. Due for re-check of lipids which I have ordered.

## 2018-10-30 NOTE — PROGRESS NOTES
Chief Complaint   Patient presents with   • Annual Wellness Visit         HPI:  Erasto is a 78 y.o. here for Medicare Annual Wellness Visit. Is an established patient of mine.        Patient Active Problem List    Diagnosis Date Noted   • Indirect hyperbilirubinemia 12/15/2017   • Hyperlipidemia 01/09/2017       Current Outpatient Prescriptions   Medication Sig Dispense Refill   • pravastatin (PRAVACHOL) 40 MG tablet Take 1 Tab by mouth every day at 6 PM. 90 Tab 3   • Ascorbic Acid (VITAMIN C PO) Take  by mouth.     • multivitamin (THERAGRAN) Tab Take 1 Tab by mouth every day.     • Calcium Carb-Cholecalciferol (CALCIUM 600 + D PO) Take  by mouth.       No current facility-administered medications for this visit.         Patient is taking medications as noted in medication list.  Current supplements as per medication list.     Allergies: Patient has no known allergies.    Current social contact/activities: working, Visit with family and friends     Is patient current with immunizations? No, due for FLU, TDAP and SHINGRIX (Shingles). Patient is interested in receiving FLU and TDAP today.    He  reports that he quit smoking about 56 years ago. His smoking use included Cigarettes. He has a 4.00 pack-year smoking history. He has never used smokeless tobacco. He reports that he drinks about 0.6 oz of alcohol per week . He reports that he does not use drugs.  Counseling given: Not Answered        DPA/Advanced directive: Patient does not have an Advanced Directive.  A packet and workshop information was given on Advanced Directives.    ROS:    Gait: Uses no assistive device   Ostomy: No   Other tubes: No   Amputations: No   Chronic oxygen use No   Last eye exam 2 months ago    Wears hearing aids: No   : Denies any urinary leakage during the last 6 months    Depression Screening    Little interest or pleasure in doing things?  0 - not at all  Feeling down, depressed, or hopeless? 0 - not at all  Patient Health  Questionnaire Score: 0    If depressive symptoms identified deferred to follow up visit unless specifically addressed in assessment and plan.    Interpretation of PHQ-9 Total Score   Score Severity   1-4 No Depression   5-9 Mild Depression   10-14 Moderate Depression   15-19 Moderately Severe Depression   20-27 Severe Depression    Screening for Cognitive Impairment    Three Minute Recall (leader, season, table)  1/3 Leader season table   Stephan clock face with all 12 numbers and set the hands to show 10 past 11.  Yes 11:10 5/5  If cognitive concerns identified, deferred for follow up unless specifically addressed in assessment and plan.    Fall Risk Assessment    Has the patient had two or more falls in the last year or any fall with injury in the last year?  No  If fall risk identified, deferred for follow up unless specifically addressed in assessment and plan.    Safety Assessment    Throw rugs on floor.  Yes  Handrails on all stairs.  Yes  Good lighting in all hallways.  Yes  Difficulty hearing.  No  Patient counseled about all safety risks that were identified.    Functional Assessment ADLs    Are there any barriers preventing you from cooking for yourself or meeting nutritional needs?  No.    Are there any barriers preventing you from driving safely or obtaining transportation?  No.    Are there any barriers preventing you from using a telephone or calling for help?  No.    Are there any barriers preventing you from shopping?  No.    Are there any barriers preventing you from taking care of your own finances?  No.    Are there any barriers preventing you from managing your medications?  No.    Are there any barriers preventing you from showering, bathing or dressing yourself?  No.    Are you currently engaging in any exercise or physical activity?  Yes.  Walking   What is your perception of your health?  Good.    Health Maintenance Summary                IMM DTaP/Tdap/Td Vaccine Overdue 9/13/1959     IMM ZOSTER  VACCINES Overdue 3/6/2013      Done 1/9/2013 Imm Admin: Zoster Vaccine Live (ZVL) (Zostavax)    IMM INFLUENZA Overdue 9/1/2018      Done 10/4/2017 Imm Admin: Influenza Vaccine Adult     Annual Wellness Visit Overdue 10/18/2018      Done 10/17/2017 Visit Dx: Medicare annual wellness visit, subsequent    COLONOSCOPY Next Due 6/18/2025      Done 6/18/2015 REFERRAL TO GI FOR COLONOSCOPY          Patient Care Team:  Azul Cash P.A.-C. as PCP - General (Physician Assistant)    Social History   Substance Use Topics   • Smoking status: Former Smoker     Packs/day: 1.00     Years: 4.00     Types: Cigarettes     Quit date: 1/1/1962   • Smokeless tobacco: Never Used   • Alcohol use 0.6 oz/week     1 Shots of liquor per week      Comment: occasionally, cocktail once a week.     Family History   Problem Relation Age of Onset   • Cancer Mother         skin cancer   • Stroke Mother 92   • Diabetes Mother    • Hypertension Father    • Heart Disease Father 55        father passed from heart attack   • No Known Problems Brother    • No Known Problems Brother    • Cancer Brother         throat ca   • Alcohol/Drug Brother         heavy drinker     He  has a past medical history of Lupus (systemic lupus erythematosus) (HCC).   Past Surgical History:   Procedure Laterality Date   • OTHER SURGICAL PROCEDURE Right 1972    right heel injury   • CAST APPLICATION      climbing, collar bone injury (7th grade)           Exam:     Blood pressure 140/90, pulse (!) 56, temperature 36.2 °C (97.2 °F), height 1.829 m (6'), weight 77.1 kg (170 lb), SpO2 96 %. Body mass index is 23.06 kg/m².    Hearing good.    Dentition good - full upper dentures, partial lower dentures  Alert, oriented in no acute distress.  Eye contact is good, speech goal directed, affect calm  Neck supple and without adenopathy or masses  Normal S1, S2, RRR  Lungs CTA bilat      Assessment and Plan. The following treatment and monitoring plan is recommended:       Hyperlipidemia  Chronic issue, well-controlled on daily pravastatin which he continues to tolerate well. Last lipid panel from 10/2017 reviewed and normal. Continue current management. Due for re-check of lipids which I have ordered.    Indirect hyperbilirubinemia  Chronic issue, stable. Has chronic mild elevation of indirect bilirubin. Previously evaluated by GI who recommended periodic monitoring. Last CMP in 12/2017. Due for re-check which I have ordered.        Services suggested: No services needed at this time  Health Care Screening recommendations as per orders if indicated.  Referrals offered: PT/OT/Nutrition counseling/Behavioral Health/Smoking cessation as per orders if indicated.    Discussion today about general wellness and lifestyle habits:    · Prevent falls and reduce trip hazards; Cautioned about securing or removing rugs.  · Have a working fire alarm and carbon monoxide detector;   · Engage in regular physical activity and social activities       Follow-up: Return in about 1 year (around 10/30/2019) for AWV.     Azul Cash P.A.-C.

## 2019-01-03 ENCOUNTER — HOSPITAL ENCOUNTER (OUTPATIENT)
Dept: LAB | Facility: MEDICAL CENTER | Age: 79
End: 2019-01-03
Attending: PHYSICIAN ASSISTANT
Payer: MEDICARE

## 2019-01-03 DIAGNOSIS — E80.6 INDIRECT HYPERBILIRUBINEMIA: ICD-10-CM

## 2019-01-03 DIAGNOSIS — E78.2 MIXED HYPERLIPIDEMIA: ICD-10-CM

## 2019-01-03 LAB
ALBUMIN SERPL BCP-MCNC: 4.1 G/DL (ref 3.2–4.9)
ALBUMIN/GLOB SERPL: 1.5 G/DL
ALP SERPL-CCNC: 77 U/L (ref 30–99)
ALT SERPL-CCNC: 16 U/L (ref 2–50)
ANION GAP SERPL CALC-SCNC: 8 MMOL/L (ref 0–11.9)
AST SERPL-CCNC: 20 U/L (ref 12–45)
BILIRUB SERPL-MCNC: 1.6 MG/DL (ref 0.1–1.5)
BUN SERPL-MCNC: 29 MG/DL (ref 8–22)
CALCIUM SERPL-MCNC: 10.1 MG/DL (ref 8.5–10.5)
CHLORIDE SERPL-SCNC: 104 MMOL/L (ref 96–112)
CHOLEST SERPL-MCNC: 163 MG/DL (ref 100–199)
CO2 SERPL-SCNC: 25 MMOL/L (ref 20–33)
CREAT SERPL-MCNC: 0.94 MG/DL (ref 0.5–1.4)
GLOBULIN SER CALC-MCNC: 2.8 G/DL (ref 1.9–3.5)
GLUCOSE SERPL-MCNC: 104 MG/DL (ref 65–99)
HDLC SERPL-MCNC: 45 MG/DL
LDLC SERPL CALC-MCNC: 95 MG/DL
POTASSIUM SERPL-SCNC: 4.1 MMOL/L (ref 3.6–5.5)
PROT SERPL-MCNC: 6.9 G/DL (ref 6–8.2)
SODIUM SERPL-SCNC: 137 MMOL/L (ref 135–145)
TRIGL SERPL-MCNC: 113 MG/DL (ref 0–149)

## 2019-01-03 PROCEDURE — 80061 LIPID PANEL: CPT

## 2019-01-03 PROCEDURE — 36415 COLL VENOUS BLD VENIPUNCTURE: CPT

## 2019-01-03 PROCEDURE — 80053 COMPREHEN METABOLIC PANEL: CPT

## 2019-03-12 ENCOUNTER — TELEPHONE (OUTPATIENT)
Dept: MEDICAL GROUP | Facility: PHYSICIAN GROUP | Age: 79
End: 2019-03-12

## 2019-03-12 NOTE — TELEPHONE ENCOUNTER
Future Appointments       Provider Department Center    3/13/2019 9:45 AM Azul Cash P.A.-C. [facility]        ESTABLISHED PATIENT PRE-VISIT PLANNING     Patient was NOT contacted to complete PVP.     Note: Patient will not be contacted if there is no indication to call.     1.  Reviewed notes from the last few office visits within the medical group: Yes    2.  If any orders were placed at last visit or intended to be done for this visit (i.e. 6 mos follow-up), do we have Results/Consult Notes?        •  Labs - Labs ordered, completed on 01/03/2019 and results are in chart.         •  Imaging - Imaging was not ordered at last office visit.       •  Referrals - No referrals were ordered at last office visit.    3. Is this appointment scheduled as a Hospital Follow-Up? No    4.  Immunizations were updated in [facility] using WebIZ?: Yes       •  Web Iz Recommendations: TD, VARICELLA (Chicken Pox)  and SHINGRIX (Shingles)    5.  Patient is due for the following Health Maintenance Topics:   Health Maintenance Due   Topic Date Due   • IMM ZOSTER VACCINES (2 of 3) 03/06/2013       6. Orders for overdue Health Maintenance topics pended in Pre-Charting? NO Shingles vaccine not available in clinic    7.  AHA (MDX) form printed for Provider? YES    8.  Patient was informed to arrive 15 min prior to their scheduled appointment and bring in their medication bottles.

## 2019-03-13 ENCOUNTER — OFFICE VISIT (OUTPATIENT)
Dept: MEDICAL GROUP | Facility: PHYSICIAN GROUP | Age: 79
End: 2019-03-13
Payer: MEDICARE

## 2019-03-13 VITALS
BODY MASS INDEX: 23.03 KG/M2 | DIASTOLIC BLOOD PRESSURE: 78 MMHG | WEIGHT: 170 LBS | HEART RATE: 74 BPM | HEIGHT: 72 IN | TEMPERATURE: 98.6 F | SYSTOLIC BLOOD PRESSURE: 130 MMHG | OXYGEN SATURATION: 96 %

## 2019-03-13 DIAGNOSIS — J06.9 ACUTE UPPER RESPIRATORY INFECTION: ICD-10-CM

## 2019-03-13 PROCEDURE — 99214 OFFICE O/P EST MOD 30 MIN: CPT | Performed by: PHYSICIAN ASSISTANT

## 2019-03-13 PROCEDURE — 8041 PR SCP AHA: Performed by: PHYSICIAN ASSISTANT

## 2019-03-13 RX ORDER — DOXYCYCLINE HYCLATE 100 MG
100 TABLET ORAL 2 TIMES DAILY
Qty: 14 TAB | Refills: 0 | Status: SHIPPED | OUTPATIENT
Start: 2019-03-13 | End: 2019-03-20

## 2019-03-13 ASSESSMENT — PATIENT HEALTH QUESTIONNAIRE - PHQ9: CLINICAL INTERPRETATION OF PHQ2 SCORE: 0

## 2019-03-13 NOTE — PATIENT INSTRUCTIONS
"Upper Respiratory Infection, Adult  Most upper respiratory infections (URIs) are a viral infection of the air passages leading to the lungs. A URI affects the nose, throat, and upper air passages. The most common type of URI is nasopharyngitis and is typically referred to as \"the common cold.\"  URIs run their course and usually go away on their own. Most of the time, a URI does not require medical attention, but sometimes a bacterial infection in the upper airways can follow a viral infection. This is called a secondary infection. Sinus and middle ear infections are common types of secondary upper respiratory infections.  Bacterial pneumonia can also complicate a URI. A URI can worsen asthma and chronic obstructive pulmonary disease (COPD). Sometimes, these complications can require emergency medical care and may be life threatening.  What are the causes?  Almost all URIs are caused by viruses. A virus is a type of germ and can spread from one person to another.  What increases the risk?  You may be at risk for a URI if:  · You smoke.  · You have chronic heart or lung disease.  · You have a weakened defense (immune) system.  · You are very young or very old.  · You have nasal allergies or asthma.  · You work in crowded or poorly ventilated areas.  · You work in health care facilities or schools.  What are the signs or symptoms?  Symptoms typically develop 2-3 days after you come in contact with a cold virus. Most viral URIs last 7-10 days. However, viral URIs from the influenza virus (flu virus) can last 14-18 days and are typically more severe. Symptoms may include:  · Runny or stuffy (congested) nose.  · Sneezing.  · Cough.  · Sore throat.  · Headache.  · Fatigue.  · Fever.  · Loss of appetite.  · Pain in your forehead, behind your eyes, and over your cheekbones (sinus pain).  · Muscle aches.  How is this diagnosed?  Your health care provider may diagnose a URI by:  · Physical exam.  · Tests to check that your " symptoms are not due to another condition such as:  ¨ Strep throat.  ¨ Sinusitis.  ¨ Pneumonia.  ¨ Asthma.  How is this treated?  A URI goes away on its own with time. It cannot be cured with medicines, but medicines may be prescribed or recommended to relieve symptoms. Medicines may help:  · Reduce your fever.  · Reduce your cough.  · Relieve nasal congestion.  Follow these instructions at home:  · Take medicines only as directed by your health care provider.  · Gargle warm saltwater or take cough drops to comfort your throat as directed by your health care provider.  · Use a warm mist humidifier or inhale steam from a shower to increase air moisture. This may make it easier to breathe.  · Drink enough fluid to keep your urine clear or pale yellow.  · Eat soups and other clear broths and maintain good nutrition.  · Rest as needed.  · Return to work when your temperature has returned to normal or as your health care provider advises. You may need to stay home longer to avoid infecting others. You can also use a face mask and careful hand washing to prevent spread of the virus.  · Increase the usage of your inhaler if you have asthma.  · Do not use any tobacco products, including cigarettes, chewing tobacco, or electronic cigarettes. If you need help quitting, ask your health care provider.  How is this prevented?  The best way to protect yourself from getting a cold is to practice good hygiene.  · Avoid oral or hand contact with people with cold symptoms.  · Wash your hands often if contact occurs.  There is no clear evidence that vitamin C, vitamin E, echinacea, or exercise reduces the chance of developing a cold. However, it is always recommended to get plenty of rest, exercise, and practice good nutrition.  Contact a health care provider if:  · You are getting worse rather than better.  · Your symptoms are not controlled by medicine.  · You have chills.  · You have worsening shortness of breath.  · You have brown  or red mucus.  · You have yellow or brown nasal discharge.  · You have pain in your face, especially when you bend forward.  · You have a fever.  · You have swollen neck glands.  · You have pain while swallowing.  · You have white areas in the back of your throat.  Get help right away if:  · You have severe or persistent:  ¨ Headache.  ¨ Ear pain.  ¨ Sinus pain.  ¨ Chest pain.  · You have chronic lung disease and any of the following:  ¨ Wheezing.  ¨ Prolonged cough.  ¨ Coughing up blood.  ¨ A change in your usual mucus.  · You have a stiff neck.  · You have changes in your:  ¨ Vision.  ¨ Hearing.  ¨ Thinking.  ¨ Mood.  This information is not intended to replace advice given to you by your health care provider. Make sure you discuss any questions you have with your health care provider.  Document Released: 06/13/2002 Document Revised: 08/20/2017 Document Reviewed: 03/25/2015  [employer] Interactive Patient Education © 2017 [employer]

## 2019-03-13 NOTE — PROGRESS NOTES
Subjective:   Erasto Calhoun Jr. is a 78 y.o. male here today for cold symptoms. Is an established patient of mine.    HPI:          Current medicines (including changes today)  Current Outpatient Prescriptions   Medication Sig Dispense Refill   • pravastatin (PRAVACHOL) 40 MG tablet Take 1 Tab by mouth every day at 6 PM. 90 Tab 3   • Ascorbic Acid (VITAMIN C PO) Take  by mouth.     • multivitamin (THERAGRAN) Tab Take 1 Tab by mouth every day.     • Calcium Carb-Cholecalciferol (CALCIUM 600 + D PO) Take  by mouth.       No current facility-administered medications for this visit.      He  has a past medical history of Lupus (systemic lupus erythematosus) (HCC).    ROS  {[id number]}       Objective:     Blood pressure 130/78, pulse 74, temperature 37 °C (98.6 °F), height 1.829 m (6'), weight 77.1 kg (170 lb), SpO2 96 %. Body mass index is 23.06 kg/m².     Physical Exam:  Constitutional: Alert, no distress.  Skin: Warm, dry, good turgor, no rashes in visible areas.  Eye: Pupils are equal and round, conjunctiva clear, lids normal.  ENMT: Lips without lesions, moist mucus membranes.  Neck: No masses. No submandibular or cervical lymphadenopathy.  Respiratory: Unlabored respiratory effort, lungs clear to auscultation, no wheezes, no rhonchi.  Cardiovascular: Normal S1, S2, no murmur, no lower extremity edema.      Assessment and Plan:   The following treatment plan was discussed    There are no diagnoses linked to this encounter.    Followup: No Follow-up on file.    Azul Cash P.A.-C.

## 2019-03-13 NOTE — PROGRESS NOTES
Subjective:     Erasto Calhoun Jr. is a 78 y.o. male here today for cold symptoms and Annual Health Assessment. Is an established patient of mine.    HPI:    Patient presents today with concern for ongoing upper respiratory symptoms. He complains of sore throat, sinus congestion, runny nose, and productive cough. Onset was three weeks ago. A few days ago, started noticing drainage of right eye. This morning, noticed same thing on left eye.   Does comment that he feels a bit better this week, but still having significant symptoms. He denies ear pain, SOB/difficulty breathing, chest tightness/pain, or wheezing.  Denies any fever, chills, body aches. Has taken 4-5 OTC medications without significant improvement.      Health Maintenance Summary                IMM ZOSTER VACCINES Overdue 3/6/2013      Done 1/9/2013 Imm Admin: Zoster Vaccine Live (ZVL) (Zostavax)    Annual Wellness Visit Next Due 10/31/2019      Done 10/30/2018 Visit Dx: Medicare annual wellness visit, subsequent     Patient has more history with this topic...    COLONOSCOPY Next Due 6/18/2025      Done 6/18/2015 REFERRAL TO GI FOR COLONOSCOPY    IMM DTaP/Tdap/Td Vaccine Next Due 10/30/2028      Done 10/30/2018 Imm Admin: Tdap Vaccine           Annual Health Assessment Questions:     1.  Are you currently engaging in any exercise or physical activity? No - only because he's currently sick. Normally quite active.    2.  How would you describe your mood or emotional well-being today? fair    3.  Have you had any falls in the last year? No    4.  Have you noticed any problems with your balance or had difficulty walking? No    5.  In the last six months have you experienced any leakage of urine? No    6. DPA/Advanced Directive: Patient does not have an Advanced Directive.  A packet and workshop information was given on Advanced Directives.      Current medicines (including changes today)  Current Outpatient Prescriptions   Medication Sig Dispense  Refill   • doxycycline (VIBRAMYCIN) 100 MG Tab Take 1 Tab by mouth 2 times a day for 7 days. 14 Tab 0   • pravastatin (PRAVACHOL) 40 MG tablet Take 1 Tab by mouth every day at 6 PM. 90 Tab 3   • Ascorbic Acid (VITAMIN C PO) Take  by mouth.     • multivitamin (THERAGRAN) Tab Take 1 Tab by mouth every day.     • Calcium Carb-Cholecalciferol (CALCIUM 600 + D PO) Take  by mouth.       No current facility-administered medications for this visit.        He  has a past medical history of Lupus (systemic lupus erythematosus) (HCC).    Patient has no known allergies.    He  reports that he quit smoking about 57 years ago. His smoking use included Cigarettes. He has a 4.00 pack-year smoking history. He has never used smokeless tobacco. He reports that he drinks about 0.6 oz of alcohol per week . He reports that he does not use drugs.  Counseling given: Not Answered      ROS   As per HPI.       Objective:     Physical Exam:  Blood pressure 130/78, pulse 74, temperature 37 °C (98.6 °F), height 1.829 m (6'), weight 77.1 kg (170 lb), SpO2 96 %. Body mass index is 23.06 kg/m².     Constitutional: Alert, well-appearing elderly male in no distress.  Skin: Warm, dry, good turgor, no rashes in visible areas.  Eye: Equal, round and reactive, conjunctiva clear, lids normal.  ENMT: External ear canals are clear without erythema, edema, or drainage.  Tympanic membranes are pearly gray bilaterally and without injection or bulging.  Nasal turbinates appear noninflamed, noninjected.  There is mild clear-white rhinorrhea.  Lips without lesions, good dentition, oropharynx clear.  No pharyngeal erythema.  There is visible white postnasal drainage.  Neck: Trachea midline, no masses, no thyromegaly. No submandibular or anterior cervical lymphadenopathy.  Respiratory: Unlabored respiratory effort, SPO2 96% on room air.  Lungs clear to auscultation, no wheezes, no rhonchi.  Cardiovascular: Normal S1, S2, no murmur.  Psych: Alert and oriented x3,  normal affect and mood.      Assessment and Plan:     1. Acute upper respiratory infection  New problem, uncontrolled.  History and exam are consistent with acute URI.  Given longevity of symptoms with minimal improvement, will treat with antibiotics.  I have prescribed doxycycline which he is to take twice daily for a week.  Also recommend that he start over-the-counter saline nasal spray.  Advised to follow-up with me again if he develops new fever, significantly worsening symptoms, or new shortness of breath/difficulty breathing.  - doxycycline (VIBRAMYCIN) 100 MG Tab; Take 1 Tab by mouth 2 times a day for 7 days.  Dispense: 14 Tab; Refill: 0      Discussion today about general wellness and lifestyle habits:    · Engage in regular physical activity and social activities.  · Prevent falls and reduce trip hazards; using ambulatory aides, hearing and vision testing if appropriate.  · Steps to improve urinary incontinence.  · Advanced care planning.    Follow-Up: Return if symptoms worsen or fail to improve.       Azul Cash P.A.-C.           PLEASE NOTE: This dictation was created using voice recognition software. I have made every reasonable attempt to correct obvious errors, but I expect that there are errors of grammar and possibly content that I did not discover before finalizing the note.

## 2019-03-26 DIAGNOSIS — E78.2 MIXED HYPERLIPIDEMIA: ICD-10-CM

## 2019-03-26 RX ORDER — PRAVASTATIN SODIUM 40 MG
40 TABLET ORAL DAILY
Qty: 90 TAB | Refills: 2 | Status: SHIPPED | OUTPATIENT
Start: 2019-03-26 | End: 2019-04-18 | Stop reason: SDUPTHER

## 2019-03-26 NOTE — TELEPHONE ENCOUNTER
Was the patient seen in the last year in this department? Yes    Does patient have an active prescription for medications requested? No     Received Request Via: Pharmacy      Pt met protocol?: Yes, OV earlier this month   Lab Results   Component Value Date/Time    CHOLSTRLTOT 163 01/03/2019 07:13 AM    LDL 95 01/03/2019 07:13 AM    HDL 45 01/03/2019 07:13 AM    TRIGLYCERIDE 113 01/03/2019 07:13 AM       Lab Results   Component Value Date/Time    SODIUM 137 01/03/2019 07:13 AM    POTASSIUM 4.1 01/03/2019 07:13 AM    CHLORIDE 104 01/03/2019 07:13 AM    CO2 25 01/03/2019 07:13 AM    GLUCOSE 104 (H) 01/03/2019 07:13 AM    BUN 29 (H) 01/03/2019 07:13 AM    CREATININE 0.94 01/03/2019 07:13 AM     Lab Results   Component Value Date/Time    ALKPHOSPHAT 77 01/03/2019 07:13 AM    ASTSGOT 20 01/03/2019 07:13 AM    ALTSGPT 16 01/03/2019 07:13 AM    TBILIRUBIN 1.6 (H) 01/03/2019 07:13 AM

## 2019-03-26 NOTE — TELEPHONE ENCOUNTER
Pt has had OV within the 12 month protocol and lipid panel is current. 9 month supply sent to pharmacy.   Lab Results   Component Value Date/Time    CHOLSTRLTOT 163 01/03/2019 07:13 AM    LDL 95 01/03/2019 07:13 AM    HDL 45 01/03/2019 07:13 AM    TRIGLYCERIDE 113 01/03/2019 07:13 AM       Lab Results   Component Value Date/Time    SODIUM 137 01/03/2019 07:13 AM    POTASSIUM 4.1 01/03/2019 07:13 AM    CHLORIDE 104 01/03/2019 07:13 AM    CO2 25 01/03/2019 07:13 AM    GLUCOSE 104 (H) 01/03/2019 07:13 AM    BUN 29 (H) 01/03/2019 07:13 AM    CREATININE 0.94 01/03/2019 07:13 AM     Lab Results   Component Value Date/Time    ALKPHOSPHAT 77 01/03/2019 07:13 AM    ASTSGOT 20 01/03/2019 07:13 AM    ALTSGPT 16 01/03/2019 07:13 AM    TBILIRUBIN 1.6 (H) 01/03/2019 07:13 AM

## 2019-04-18 DIAGNOSIS — E78.2 MIXED HYPERLIPIDEMIA: ICD-10-CM

## 2019-04-18 RX ORDER — PRAVASTATIN SODIUM 40 MG
40 TABLET ORAL DAILY
Qty: 100 TAB | Refills: 2 | Status: SHIPPED | OUTPATIENT
Start: 2019-04-18 | End: 2020-04-22

## 2019-10-05 ENCOUNTER — IMMUNIZATION (OUTPATIENT)
Dept: SOCIAL WORK | Facility: CLINIC | Age: 79
End: 2019-10-05
Payer: MEDICARE

## 2019-10-05 DIAGNOSIS — Z23 NEED FOR VACCINATION: ICD-10-CM

## 2019-10-05 PROCEDURE — G0008 ADMIN INFLUENZA VIRUS VAC: HCPCS | Performed by: REGISTERED NURSE

## 2019-10-05 PROCEDURE — 90662 IIV NO PRSV INCREASED AG IM: CPT | Performed by: REGISTERED NURSE

## 2019-12-20 ENCOUNTER — OFFICE VISIT (OUTPATIENT)
Dept: MEDICAL GROUP | Facility: PHYSICIAN GROUP | Age: 79
End: 2019-12-20
Payer: MEDICARE

## 2019-12-20 VITALS
WEIGHT: 168 LBS | DIASTOLIC BLOOD PRESSURE: 70 MMHG | SYSTOLIC BLOOD PRESSURE: 120 MMHG | HEART RATE: 60 BPM | BODY MASS INDEX: 22.75 KG/M2 | OXYGEN SATURATION: 94 % | HEIGHT: 72 IN | TEMPERATURE: 98.6 F | RESPIRATION RATE: 14 BRPM

## 2019-12-20 DIAGNOSIS — Z00.00 MEDICARE ANNUAL WELLNESS VISIT, INITIAL: ICD-10-CM

## 2019-12-20 DIAGNOSIS — E80.6 INDIRECT HYPERBILIRUBINEMIA: ICD-10-CM

## 2019-12-20 DIAGNOSIS — E78.2 MIXED HYPERLIPIDEMIA: ICD-10-CM

## 2019-12-20 PROCEDURE — G0439 PPPS, SUBSEQ VISIT: HCPCS | Performed by: PHYSICIAN ASSISTANT

## 2019-12-20 ASSESSMENT — ACTIVITIES OF DAILY LIVING (ADL): BATHING_REQUIRES_ASSISTANCE: 0

## 2019-12-20 ASSESSMENT — ENCOUNTER SYMPTOMS: GENERAL WELL-BEING: EXCELLENT

## 2019-12-20 ASSESSMENT — PATIENT HEALTH QUESTIONNAIRE - PHQ9: CLINICAL INTERPRETATION OF PHQ2 SCORE: 0

## 2019-12-20 NOTE — ASSESSMENT & PLAN NOTE
Established problem which has been well-controlled with pravastatin 40 mg daily. Last lipid profile in 1/2019 reviewed and normal. Continue current management. Will get updated lab work next month.

## 2019-12-20 NOTE — ASSESSMENT & PLAN NOTE
Established problem, stable. Has had mildly elevated indirect bilirubin for years. Has had previous GI evaluation who recommended surveillance. Last CMP from 1/2019 reviewed which showed bilirubin level of 1.6. Will continue to monitor annually. Due for re-check next month.

## 2019-12-20 NOTE — NON-PROVIDER
Chief Complaint   Patient presents with   • Annual Wellness Visit         HPI:  Erasto Calhoun Jr. is a 79 y.o. here for Medicare Annual Wellness Visit     Patient Active Problem List    Diagnosis Date Noted   • Indirect hyperbilirubinemia 12/15/2017   • Hyperlipidemia 01/09/2017       Current Outpatient Medications   Medication Sig Dispense Refill   • pravastatin (PRAVACHOL) 40 MG tablet Take 1 Tab by mouth every day at 6 PM. 100 Tab 2   • Ascorbic Acid (VITAMIN C PO) Take  by mouth.     • multivitamin (THERAGRAN) Tab Take 1 Tab by mouth every day.     • Calcium Carb-Cholecalciferol (CALCIUM 600 + D PO) Take  by mouth.       No current facility-administered medications for this visit.             Current supplements as per medication list.       Allergies: Patient has no known allergies.    Current social contact/activities: none     He  reports that he quit smoking about 58 years ago. His smoking use included cigarettes. He has a 4.00 pack-year smoking history. He has never used smokeless tobacco. He reports current alcohol use of about 0.6 oz of alcohol per week. He reports that he does not use drugs.  Counseling given: Not Answered  Comment: quit 1962      DPA/Advanced Directive:  Patient does not have an Advanced Directive.  A packet and workshop information was given on Advanced Directives.    ROS:    Gait: Uses no assistive device  Ostomy: No  Other tubes: No  Amputations: No  Chronic oxygen use: No  Last eye exam: 12/1/19  Wears hearing aids: No   : Denies any urinary leakage during the last 6 months    Screening:  ***  Depression Screening    Little interest or pleasure in doing things?  0 - not at all  Feeling down, depressed , or hopeless? 0 - not at all  Patient Health Questionnaire Score: 0     If depressive symptoms identified deferred to follow up visit unless specifically addressed in assessment and plan.    Interpretation of PHQ-9 Total Score   Score Severity   1-4 No Depression   5-9 Mild  Depression   10-14 Moderate Depression   15-19 Moderately Severe Depression   20-27 Severe Depression    Screening for Cognitive Impairment    Three Minute Recall (village, kitchen, baby) 3/3    Stephan clock face with all 12 numbers and set the hands to show 10 past 10.  Yes    Cognitive concerns identified deferred for follow up unless specifically addressed in assessment and plan.    Fall Risk Assessment    Has the patient had two or more falls in the last year or any fall with injury in the last year?  No    Safety Assessment    Throw rugs on floor.  Yes  Handrails on all stairs.  No  Good lighting in all hallways.  Yes  Difficulty hearing.  No  Patient counseled about all safety risks that were identified.    Functional Assessment ADLs    Are there any barriers preventing you from cooking for yourself or meeting nutritional needs?  Yes.    Are there any barriers preventing you from driving safely or obtaining transportation?  No.    Are there any barriers preventing you from using a telephone or calling for help?  No.    Are there any barriers preventing you from shopping?  Yes.    Are there any barriers preventing you from taking care of your own finances?  No.    Are there any barriers preventing you from managing your medications?  No.    Are there any barriers preventing you from showering, bathing or dressing yourself?  No.    Are you currently engaging in any exercise or physical activity?  No.     What is your perception of your health?  Excellent.      Health Maintenance Summary                IMM ZOSTER VACCINES Overdue 3/6/2013      Done 1/9/2013 Imm Admin: Zoster Vaccine Live (ZVL) (Zostavax)    Annual Wellness Visit Overdue 10/31/2019      Done 10/30/2018 Visit Dx: Medicare annual wellness visit, subsequent     Patient has more history with this topic...    COLONOSCOPY Next Due 6/18/2025      Done 6/18/2015 REFERRAL TO GI FOR COLONOSCOPY    IMM DTaP/Tdap/Td Vaccine Next Due 10/30/2028      Done  10/30/2018 [facility] Admin: Tdap Vaccine          Patient Care Team:  Azul Cash P.A.-C. as PCP - General (Physician Assistant)        Social History     Tobacco Use   • Smoking status: Former Smoker     Packs/day: 1.00     Years: 4.00     Pack years: 4.00     Types: Cigarettes     Last attempt to quit: 1962     Years since quittin.0   • Smokeless tobacco: Never Used   • Tobacco comment: quit    Substance Use Topics   • Alcohol use: Yes     Alcohol/week: 0.6 oz     Types: 1 Shots of liquor per week     Comment: occasionally, cocktail once a week.   • Drug use: No     Family History   Problem Relation Age of Onset   • Cancer Mother         skin cancer   • Stroke Mother 92   • Diabetes Mother    • Hypertension Father    • Heart Disease Father 55        father passed from heart attack   • No Known Problems Brother    • No Known Problems Brother    • Cancer Brother         throat ca   • Alcohol/Drug Brother         heavy drinker     He  has a past medical history of Lupus (systemic lupus erythematosus) (HCC).   Past Surgical History:   Procedure Laterality Date   • OTHER SURGICAL PROCEDURE Right     right heel injury   • CAST APPLICATION      climbing, collar bone injury (7th grade)       Exam:   /70 (BP Location: Left arm, Patient Position: Sitting, BP Cuff Size: Adult)   Pulse 60   Temp 37 °C (98.6 °F) (Temporal)   Resp 14   Ht 1.829 m (6')   Wt 76.2 kg (168 lb)   SpO2 94%  Body mass index is 22.78 kg/m².    Hearing good.    Dentition {DENTITION:56672}  Alert, oriented in no acute distress.  Eye contact is good, speech goal directed, affect calm    Assessment and Plan. The following treatment and monitoring plan is recommended:  ***  No diagnosis found.      Services suggested: { AWV COORDINATION OF SERVICES:}  Health Care Screening: Age-appropriate preventive services recommended by USPTF and ACIP covered by Medicare were discussed today. Services ordered if indicated and agreed  upon by the patient.  Referrals offered: Community-based lifestyle interventions to reduce health risks and promote self-management and wellness, fall prevention, nutrition, physical activity, tobacco-use cessation, weight loss, and mental health services as per orders if indicated.    Discussion today about general wellness and lifestyle habits:    · Prevent falls and reduce trip hazards; Cautioned about securing or removing rugs.  · Have a working fire alarm and carbon monoxide detector;   · Engage in regular physical activity and social activities     Follow-up: No follow-ups on file.

## 2019-12-20 NOTE — PROGRESS NOTES
Chief Complaint   Patient presents with   • Annual Wellness Visit         HPI:  Erasto Calhoun Jr. is a 79 y.o. here for Medicare Annual Wellness Visit. Is an established patient of mine.      Patient Active Problem List    Diagnosis Date Noted   • Indirect hyperbilirubinemia 12/15/2017   • Hyperlipidemia 01/09/2017       Current Outpatient Medications   Medication Sig Dispense Refill   • pravastatin (PRAVACHOL) 40 MG tablet Take 1 Tab by mouth every day at 6 PM. 100 Tab 2   • Ascorbic Acid (VITAMIN C PO) Take  by mouth.     • multivitamin (THERAGRAN) Tab Take 1 Tab by mouth every day.     • Calcium Carb-Cholecalciferol (CALCIUM 600 + D PO) Take  by mouth.       No current facility-administered medications for this visit.             Current supplements as per medication list.       Allergies: Patient has no known allergies.    Current social contact/activities: none     He  reports that he quit smoking about 58 years ago. His smoking use included cigarettes. He has a 4.00 pack-year smoking history. He has never used smokeless tobacco. He reports current alcohol use of about 0.6 oz of alcohol per week. He reports that he does not use drugs.  Counseling given: Not Answered  Comment: quit 1962      DPA/Advanced Directive:  Patient does not have an Advanced Directive.  A packet and workshop information was given on Advanced Directives.    ROS:    Gait: Uses no assistive device  Ostomy: No  Other tubes: No  Amputations: No  Chronic oxygen use: No  Last eye exam: 12/1/19  Wears hearing aids: No   : Denies any urinary leakage during the last 6 months    Screening:    Depression Screening    Little interest or pleasure in doing things?  0 - not at all  Feeling down, depressed , or hopeless? 0 - not at all  Patient Health Questionnaire Score: 0     If depressive symptoms identified deferred to follow up visit unless specifically addressed in assessment and plan.    Interpretation of PHQ-9 Total Score   Score  Severity   1-4 No Depression   5-9 Mild Depression   10-14 Moderate Depression   15-19 Moderately Severe Depression   20-27 Severe Depression    Screening for Cognitive Impairment    Three Minute Recall (village, kitchen, baby) 3/3    Stephan clock face with all 12 numbers and set the hands to show 10 past 10.  Yes    Cognitive concerns identified deferred for follow up unless specifically addressed in assessment and plan.    Fall Risk Assessment    Has the patient had two or more falls in the last year or any fall with injury in the last year?  No    Safety Assessment    Throw rugs on floor.  Yes  Handrails on all stairs.  No  Good lighting in all hallways.  Yes  Difficulty hearing.  No  Patient counseled about all safety risks that were identified.    Functional Assessment ADLs    Are there any barriers preventing you from cooking for yourself or meeting nutritional needs?  Yes.    Are there any barriers preventing you from driving safely or obtaining transportation?  No.    Are there any barriers preventing you from using a telephone or calling for help?  No.    Are there any barriers preventing you from shopping?  Yes.    Are there any barriers preventing you from taking care of your own finances?  No.    Are there any barriers preventing you from managing your medications?  No.    Are there any barriers preventing you from showering, bathing or dressing yourself?  No.    Are you currently engaging in any exercise or physical activity?  No.     What is your perception of your health?  Excellent.      Health Maintenance Summary                IMM ZOSTER VACCINES Overdue 3/6/2013      Done 1/9/2013 Imm Admin: Zoster Vaccine Live (ZVL) (Zostavax)    Annual Wellness Visit Overdue 10/31/2019      Done 10/30/2018 Visit Dx: Medicare annual wellness visit, subsequent     Patient has more history with this topic...    COLONOSCOPY Next Due 6/18/2025      Done 6/18/2015 REFERRAL TO GI FOR COLONOSCOPY    IMM DTaP/Tdap/Td  Vaccine Next Due 10/30/2028      Done 10/30/2018 Imm Admin: Tdap Vaccine          Patient Care Team:  Azul Cash P.A.-C. as PCP - General (Physician Assistant)        Social History     Tobacco Use   • Smoking status: Former Smoker     Packs/day: 1.00     Years: 4.00     Pack years: 4.00     Types: Cigarettes     Last attempt to quit: 1962     Years since quittin.0   • Smokeless tobacco: Never Used   • Tobacco comment: quit    Substance Use Topics   • Alcohol use: Yes     Alcohol/week: 0.6 oz     Types: 1 Shots of liquor per week     Comment: occasionally, cocktail once a week.   • Drug use: No     Family History   Problem Relation Age of Onset   • Cancer Mother         skin cancer   • Stroke Mother 92   • Diabetes Mother    • Hypertension Father    • Heart Disease Father 55        father passed from heart attack   • No Known Problems Brother    • No Known Problems Brother    • Cancer Brother         throat ca   • Alcohol/Drug Brother         heavy drinker     He  has a past medical history of Lupus (systemic lupus erythematosus) (HCC).   Past Surgical History:   Procedure Laterality Date   • OTHER SURGICAL PROCEDURE Right     right heel injury   • CAST APPLICATION      climbing, collar bone injury (7th grade)       Exam:   /70 (BP Location: Left arm, Patient Position: Sitting, BP Cuff Size: Adult)   Pulse 60   Temp 37 °C (98.6 °F) (Temporal)   Resp 14   Ht 1.829 m (6')   Wt 76.2 kg (168 lb)   SpO2 94%  Body mass index is 22.78 kg/m².    Hearing good.    Dentition upper and lower dentures  Alert, oriented in no acute distress.  Eye contact is good, speech goal directed, affect calm  Neck supple and without masses or thyromegaly  Normal S1/S2, RRR  Lungs CTA bilat      Assessment and Plan. The following treatment and monitoring plan is recommended:      Hyperlipidemia  Established problem which has been well-controlled with pravastatin 40 mg daily. Last lipid profile in 2019  reviewed and normal. Continue current management. Will get updated lab work next month.    Indirect hyperbilirubinemia  Established problem, stable. Has had mildly elevated indirect bilirubin for years. Has had previous GI evaluation who recommended surveillance. Last CMP from 1/2019 reviewed which showed bilirubin level of 1.6. Will continue to monitor annually. Due for re-check next month.    Patient mentioned that he has been having periodic cramps in his thighs, mostly at night when he is laying in bed.  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  Recommended that he start doing quad stretches before bedtime.    He also mentioned that he has been noticing that his stool appears small and softer over the last few months.  He denies any other GI concerns including abdominal pain, vomiting, bloody stools, constipation.  Reassured patient that this is nothing of concern.  Stool can change in appearance and this does not indicate a problem in the absence of other symptoms.    Finally, we did discuss over-the-counter medication for cough.  He came down with an upper respiratory infection a couple of months back and still has a lingering cough.  It has been continuing to improve but is sometimes bothersome.  We discussed that over-the-counter dextromethorphan would be fine for him to use.    Services suggested: No services needed at this time  Health Care Screening: Age-appropriate preventive services recommended by USPTF and ACIP covered by Medicare were discussed today. Services ordered if indicated and agreed upon by the patient.  Referrals offered: Community-based lifestyle interventions to reduce health risks and promote self-management and wellness, fall prevention, nutrition, physical activity, tobacco-use cessation, weight loss, and mental health services as per orders if indicated.    Discussion today about general wellness and lifestyle habits:    · Prevent falls and reduce  trip hazards; Cautioned about securing or removing rugs.  · Have a working fire alarm and carbon monoxide detector;   · Engage in regular physical activity and social activities   ·     Follow-up: Return in about 1 year (around 12/20/2020) for AWV.     Azul Cash P.A.-C.

## 2019-12-20 NOTE — NON-PROVIDER
Chief Complaint   Patient presents with   • Annual Wellness Visit         HPI:  Erasto Calhoun Jr. is a 79 y.o. here for Medicare Annual Wellness Visit     Patient Active Problem List    Diagnosis Date Noted   • Indirect hyperbilirubinemia 12/15/2017   • Hyperlipidemia 01/09/2017       Current Outpatient Medications   Medication Sig Dispense Refill   • pravastatin (PRAVACHOL) 40 MG tablet Take 1 Tab by mouth every day at 6 PM. 100 Tab 2   • Ascorbic Acid (VITAMIN C PO) Take  by mouth.     • multivitamin (THERAGRAN) Tab Take 1 Tab by mouth every day.     • Calcium Carb-Cholecalciferol (CALCIUM 600 + D PO) Take  by mouth.       No current facility-administered medications for this visit.             Current supplements as per medication list.       Allergies: Patient has no known allergies.    Current social contact/activities: none     He  reports that he quit smoking about 58 years ago. His smoking use included cigarettes. He has a 4.00 pack-year smoking history. He has never used smokeless tobacco. He reports current alcohol use of about 0.6 oz of alcohol per week. He reports that he does not use drugs.  Counseling given: Not Answered  Comment: quit 1962      DPA/Advanced Directive:  Patient does not have an Advanced Directive.  A packet and workshop information was given on Advanced Directives.    ROS:    Gait: Uses no assistive device  Ostomy: No  Other tubes: No  Amputations: No  Chronic oxygen use: No  Last eye exam: 12/1/19  Wears hearing aids: No   : Denies any urinary leakage during the last 6 months    Screening:  ***  Depression Screening    Little interest or pleasure in doing things?  0 - not at all  Feeling down, depressed , or hopeless? 0 - not at all  Patient Health Questionnaire Score: 0     If depressive symptoms identified deferred to follow up visit unless specifically addressed in assessment and plan.    Interpretation of PHQ-9 Total Score   Score Severity   1-4 No Depression   5-9 Mild  Depression   10-14 Moderate Depression   15-19 Moderately Severe Depression   20-27 Severe Depression    Screening for Cognitive Impairment    Three Minute Recall (village, kitchen, baby) 3/3    Stephan clock face with all 12 numbers and set the hands to show 10 past 10.  Yes    Cognitive concerns identified deferred for follow up unless specifically addressed in assessment and plan.    Fall Risk Assessment    Has the patient had two or more falls in the last year or any fall with injury in the last year?  No    Safety Assessment    Throw rugs on floor.  Yes  Handrails on all stairs.  No  Good lighting in all hallways.  Yes  Difficulty hearing.  No  Patient counseled about all safety risks that were identified.    Functional Assessment ADLs    Are there any barriers preventing you from cooking for yourself or meeting nutritional needs?  Yes.    Are there any barriers preventing you from driving safely or obtaining transportation?  No.    Are there any barriers preventing you from using a telephone or calling for help?  No.    Are there any barriers preventing you from shopping?  Yes.    Are there any barriers preventing you from taking care of your own finances?  No.    Are there any barriers preventing you from managing your medications?  No.    Are there any barriers preventing you from showering, bathing or dressing yourself?  No.    Are you currently engaging in any exercise or physical activity?  No.     What is your perception of your health?  Excellent.      Health Maintenance Summary                IMM ZOSTER VACCINES Overdue 3/6/2013      Done 1/9/2013 Imm Admin: Zoster Vaccine Live (ZVL) (Zostavax)    Annual Wellness Visit Overdue 10/31/2019      Done 10/30/2018 Visit Dx: Medicare annual wellness visit, subsequent     Patient has more history with this topic...    COLONOSCOPY Next Due 6/18/2025      Done 6/18/2015 REFERRAL TO GI FOR COLONOSCOPY    IMM DTaP/Tdap/Td Vaccine Next Due 10/30/2028      Done  10/30/2018 Imm Admin: Tdap Vaccine          Patient Care Team:  Azul Cash P.A.-C. as PCP - General (Physician Assistant)        Social History     Tobacco Use   • Smoking status: Former Smoker     Packs/day: 1.00     Years: 4.00     Pack years: 4.00     Types: Cigarettes     Last attempt to quit: 1962     Years since quittin.0   • Smokeless tobacco: Never Used   • Tobacco comment: quit    Substance Use Topics   • Alcohol use: Yes     Alcohol/week: 0.6 oz     Types: 1 Shots of liquor per week     Comment: occasionally, cocktail once a week.   • Drug use: No     Family History   Problem Relation Age of Onset   • Cancer Mother         skin cancer   • Stroke Mother 92   • Diabetes Mother    • Hypertension Father    • Heart Disease Father 55        father passed from heart attack   • No Known Problems Brother    • No Known Problems Brother    • Cancer Brother         throat ca   • Alcohol/Drug Brother         heavy drinker     He  has a past medical history of Lupus (systemic lupus erythematosus) (HCC).   Past Surgical History:   Procedure Laterality Date   • OTHER SURGICAL PROCEDURE Right     right heel injury   • CAST APPLICATION      climbing, collar bone injury (7th grade)       Exam:   /70 (BP Location: Left arm, Patient Position: Sitting, BP Cuff Size: Adult)   Pulse 60   Temp 37 °C (98.6 °F) (Temporal)   Resp 14   Ht 1.829 m (6')   Wt 76.2 kg (168 lb)   SpO2 94%  Body mass index is 22.78 kg/m².    Hearing good.    Dentition {DENTITION:26719}  Alert, oriented in no acute distress.  Eye contact is good, speech goal directed, affect calm    Assessment and Plan. The following treatment and monitoring plan is recommended:  ***  1. Medicare annual wellness visit, initial  Subsequent Annual Wellness Visit - Includes PPPS ()   2. Mixed hyperlipidemia  Subsequent Annual Wellness Visit - Includes PPPS ()    Lipid Profile    Comp Metabolic Panel   3. Indirect hyperbilirubinemia   Subsequent Annual Wellness Visit - Includes PPPS ()    Comp Metabolic Panel         Services suggested: { AWV COORDINATION OF SERVICES:20405}  Health Care Screening: Age-appropriate preventive services recommended by USPTF and ACIP covered by Medicare were discussed today. Services ordered if indicated and agreed upon by the patient.  Referrals offered: Community-based lifestyle interventions to reduce health risks and promote self-management and wellness, fall prevention, nutrition, physical activity, tobacco-use cessation, weight loss, and mental health services as per orders if indicated.    Discussion today about general wellness and lifestyle habits:    · Prevent falls and reduce trip hazards; Cautioned about securing or removing rugs.  · Have a working fire alarm and carbon monoxide detector;   · Engage in regular physical activity and social activities     Follow-up: Return in about 1 year (around 12/20/2020) for AWV.

## 2020-01-02 ENCOUNTER — HOSPITAL ENCOUNTER (OUTPATIENT)
Dept: LAB | Facility: MEDICAL CENTER | Age: 80
End: 2020-01-02
Attending: PHYSICIAN ASSISTANT
Payer: MEDICARE

## 2020-01-02 DIAGNOSIS — E78.2 MIXED HYPERLIPIDEMIA: ICD-10-CM

## 2020-01-02 DIAGNOSIS — E80.6 INDIRECT HYPERBILIRUBINEMIA: ICD-10-CM

## 2020-01-02 LAB
ALBUMIN SERPL BCP-MCNC: 3.5 G/DL (ref 3.2–4.9)
ALBUMIN/GLOB SERPL: 1.2 G/DL
ALP SERPL-CCNC: 65 U/L (ref 30–99)
ALT SERPL-CCNC: 16 U/L (ref 2–50)
ANION GAP SERPL CALC-SCNC: 7 MMOL/L (ref 0–11.9)
AST SERPL-CCNC: 17 U/L (ref 12–45)
BILIRUB SERPL-MCNC: 1.2 MG/DL (ref 0.1–1.5)
BUN SERPL-MCNC: 27 MG/DL (ref 8–22)
CALCIUM SERPL-MCNC: 8.9 MG/DL (ref 8.5–10.5)
CHLORIDE SERPL-SCNC: 108 MMOL/L (ref 96–112)
CHOLEST SERPL-MCNC: 184 MG/DL (ref 100–199)
CO2 SERPL-SCNC: 27 MMOL/L (ref 20–33)
CREAT SERPL-MCNC: 1.02 MG/DL (ref 0.5–1.4)
FASTING STATUS PATIENT QL REPORTED: NORMAL
GLOBULIN SER CALC-MCNC: 3 G/DL (ref 1.9–3.5)
GLUCOSE SERPL-MCNC: 101 MG/DL (ref 65–99)
HDLC SERPL-MCNC: 42 MG/DL
LDLC SERPL CALC-MCNC: 118 MG/DL
POTASSIUM SERPL-SCNC: 4.8 MMOL/L (ref 3.6–5.5)
PROT SERPL-MCNC: 6.5 G/DL (ref 6–8.2)
SODIUM SERPL-SCNC: 142 MMOL/L (ref 135–145)
TRIGL SERPL-MCNC: 122 MG/DL (ref 0–149)

## 2020-01-02 PROCEDURE — 36415 COLL VENOUS BLD VENIPUNCTURE: CPT

## 2020-01-02 PROCEDURE — 80061 LIPID PANEL: CPT

## 2020-01-02 PROCEDURE — 80053 COMPREHEN METABOLIC PANEL: CPT

## 2020-01-10 ENCOUNTER — TELEPHONE (OUTPATIENT)
Dept: MEDICAL GROUP | Facility: PHYSICIAN GROUP | Age: 80
End: 2020-01-10

## 2020-01-10 NOTE — TELEPHONE ENCOUNTER
"----- Message from Azul Cash P.A.-C. sent at 1/8/2020 10:36 PM PST -----  Please inform patient that his recent lab results show nothing of significant concern. His LDL (\"bad cholesterol\") level is mildly elevated. He should continue his pravastatin and try to stick to heart-healthy diet high in fruits/vegetables, whole grains, and lean proteins, and low in carbohydrates, added sugars, and saturated fats.   Azul Cash P.A.-C.    "

## 2020-01-13 SDOH — ECONOMIC STABILITY: TRANSPORTATION INSECURITY
IN THE PAST 12 MONTHS, HAS THE LACK OF TRANSPORTATION KEPT YOU FROM MEDICAL APPOINTMENTS OR FROM GETTING MEDICATIONS?: NO

## 2020-01-13 SDOH — ECONOMIC STABILITY: TRANSPORTATION INSECURITY
IN THE PAST 12 MONTHS, HAS LACK OF TRANSPORTATION KEPT YOU FROM MEETINGS, WORK, OR FROM GETTING THINGS NEEDED FOR DAILY LIVING?: NO

## 2020-01-13 NOTE — PROGRESS NOTES
1. [facility] Verified: yes    2. Verify PCP: yes    3. Review and add  to Care Team: yes        5. Reviewed/Updated the following with patient:       •   Communication Preference Obtained? YES  •               •   Appointment Day and Time Preferences? YES       •   Preferred Pharmacy? YES       •   Preferred Lab? YES

## 2020-01-28 ENCOUNTER — ANESTHESIA EVENT (OUTPATIENT)
Dept: SURGERY | Facility: MEDICAL CENTER | Age: 80
End: 2020-01-28
Payer: MEDICARE

## 2020-01-28 ENCOUNTER — HOSPITAL ENCOUNTER (OUTPATIENT)
Facility: MEDICAL CENTER | Age: 80
End: 2020-01-28
Attending: OPHTHALMOLOGY | Admitting: OPHTHALMOLOGY
Payer: MEDICARE

## 2020-01-28 ENCOUNTER — ANESTHESIA (OUTPATIENT)
Dept: SURGERY | Facility: MEDICAL CENTER | Age: 80
End: 2020-01-28
Payer: MEDICARE

## 2020-01-28 VITALS
WEIGHT: 171.3 LBS | HEIGHT: 72 IN | SYSTOLIC BLOOD PRESSURE: 137 MMHG | OXYGEN SATURATION: 95 % | RESPIRATION RATE: 18 BRPM | DIASTOLIC BLOOD PRESSURE: 85 MMHG | TEMPERATURE: 98 F | HEART RATE: 70 BPM | BODY MASS INDEX: 23.2 KG/M2

## 2020-01-28 PROCEDURE — 700111 HCHG RX REV CODE 636 W/ 250 OVERRIDE (IP): Performed by: ANESTHESIOLOGY

## 2020-01-28 PROCEDURE — 700101 HCHG RX REV CODE 250

## 2020-01-28 PROCEDURE — 500855 HCHG NEEDLE, IRRIG CYSTOTOME 27G: Performed by: OPHTHALMOLOGY

## 2020-01-28 PROCEDURE — 160025 RECOVERY II MINUTES (STATS): Performed by: OPHTHALMOLOGY

## 2020-01-28 PROCEDURE — 502240 HCHG MISC OR SUPPLY RC 0272: Performed by: OPHTHALMOLOGY

## 2020-01-28 PROCEDURE — 160028 HCHG SURGERY MINUTES - 1ST 30 MINS LEVEL 3: Performed by: OPHTHALMOLOGY

## 2020-01-28 PROCEDURE — 700111 HCHG RX REV CODE 636 W/ 250 OVERRIDE (IP): Mod: JW

## 2020-01-28 PROCEDURE — 700105 HCHG RX REV CODE 258: Performed by: OPHTHALMOLOGY

## 2020-01-28 PROCEDURE — 160009 HCHG ANES TIME/MIN: Performed by: OPHTHALMOLOGY

## 2020-01-28 PROCEDURE — 501749 HCHG SHELL REV 276: Performed by: OPHTHALMOLOGY

## 2020-01-28 PROCEDURE — 501539 HCHG TIP, PHACO: Performed by: OPHTHALMOLOGY

## 2020-01-28 PROCEDURE — 160046 HCHG PACU - 1ST 60 MINS PHASE II: Performed by: OPHTHALMOLOGY

## 2020-01-28 PROCEDURE — 160048 HCHG OR STATISTICAL LEVEL 1-5: Performed by: OPHTHALMOLOGY

## 2020-01-28 DEVICE — LENS PRE-LOADED TECNIS CLEAR MONO 6.0MM 24.5D: Type: IMPLANTABLE DEVICE | Status: FUNCTIONAL

## 2020-01-28 RX ORDER — TROPICAMIDE 10 MG/ML
SOLUTION/ DROPS OPHTHALMIC
Status: COMPLETED
Start: 2020-01-28 | End: 2020-01-28

## 2020-01-28 RX ORDER — PHENYLEPHRINE HYDROCHLORIDE 25 MG/ML
SOLUTION/ DROPS OPHTHALMIC
Status: COMPLETED
Start: 2020-01-28 | End: 2020-01-28

## 2020-01-28 RX ORDER — EPINEPHRINE 1 MG/ML(1)
AMPUL (ML) INJECTION
Status: DISCONTINUED
Start: 2020-01-28 | End: 2020-01-28 | Stop reason: HOSPADM

## 2020-01-28 RX ORDER — MIDAZOLAM HYDROCHLORIDE 1 MG/ML
INJECTION INTRAMUSCULAR; INTRAVENOUS PRN
Status: DISCONTINUED | OUTPATIENT
Start: 2020-01-28 | End: 2020-01-28 | Stop reason: SURG

## 2020-01-28 RX ORDER — ONDANSETRON 2 MG/ML
4 INJECTION INTRAMUSCULAR; INTRAVENOUS
Status: DISCONTINUED | OUTPATIENT
Start: 2020-01-28 | End: 2020-01-28 | Stop reason: HOSPADM

## 2020-01-28 RX ORDER — SODIUM CHLORIDE, SODIUM LACTATE, POTASSIUM CHLORIDE, CALCIUM CHLORIDE 600; 310; 30; 20 MG/100ML; MG/100ML; MG/100ML; MG/100ML
INJECTION, SOLUTION INTRAVENOUS CONTINUOUS
Status: DISCONTINUED | OUTPATIENT
Start: 2020-01-28 | End: 2020-01-28 | Stop reason: HOSPADM

## 2020-01-28 RX ORDER — TETRACAINE HYDROCHLORIDE 5 MG/ML
SOLUTION OPHTHALMIC
Status: COMPLETED
Start: 2020-01-28 | End: 2020-01-28

## 2020-01-28 RX ADMIN — TROPICAMIDE 3 DROP: 10 SOLUTION/ DROPS OPHTHALMIC at 11:48

## 2020-01-28 RX ADMIN — TETRACAINE HYDROCHLORIDE 3 DROP: 5 SOLUTION OPHTHALMIC at 11:48

## 2020-01-28 RX ADMIN — PHENYLEPHRINE HYDROCHLORIDE 3 DROP: 2.5 SOLUTION/ DROPS OPHTHALMIC at 11:48

## 2020-01-28 RX ADMIN — SODIUM CHLORIDE, POTASSIUM CHLORIDE, SODIUM LACTATE AND CALCIUM CHLORIDE: 600; 310; 30; 20 INJECTION, SOLUTION INTRAVENOUS at 11:50

## 2020-01-28 RX ADMIN — FENTANYL CITRATE 50 MCG: 50 INJECTION, SOLUTION INTRAMUSCULAR; INTRAVENOUS at 12:56

## 2020-01-28 RX ADMIN — FENTANYL CITRATE 50 MCG: 50 INJECTION, SOLUTION INTRAMUSCULAR; INTRAVENOUS at 13:03

## 2020-01-28 RX ADMIN — MIDAZOLAM HYDROCHLORIDE 2 MG: 1 INJECTION, SOLUTION INTRAMUSCULAR; INTRAVENOUS at 12:55

## 2020-01-28 ASSESSMENT — PAIN SCALES - GENERAL: PAIN_LEVEL: 0

## 2020-01-28 NOTE — ANESTHESIA QCDR
2019 [facility] Clinical Data Registry (for Quality Improvement)     Postoperative nausea/vomiting risk protocol (Adult = 18 yrs and Pediatric 3-17 yrs)- (430 and 463)  General inhalation anesthetic (NOT TIVA) with PONV risk factors: No  Provision of anti-emetic therapy with at least 2 different classes of agents: N/A  Patient DID NOT receive anti-emetic therapy and reason is documented in Medical Record: N/A    Multimodal Pain Management- (477)  Non-emergent surgery AND patient age >= 18: Yes  Use of Multimodal Pain Management, two or more drugs and/or interventions, NOT including systemic opioids: No  Exception: Documented allergy to multiple classes of analgesics: No       Smoking Abstinence (404)  Patient is current smoker (cigarette, pipe, e-cig, marijuanna): No  Elective Surgery:   Abstinence instructions provided prior to day of surgery:   Patient abstained from smoking on day of surgery:     Pre-Op Beta-Blocker in Isolated CABG (44)  Isolated CABG AND patient age >= 18: No  Beta-blocker admin within 24 hours of surgical incision:   Exception:of medical reason(s) for not administering beta blocker within 24 hours prior to surgical incision (e.g., not  indicated,other medical reason):     PACU assessment of acute postoperative pain prior to Anesthesia Care End- Applies to Patients Age = 18- (ABG7)  Initial PACU pain score is which of the following: < 7/10  Patient unable to report pain score: N/A    Post-anesthetic transfer of care checklist/protocol to PACU/ICU- (426 and 427)  Upon conclusion of case, patient transferred to which of the following locations: PACU/Non-ICU  Use of transfer checklist/protocol: Yes  Exclusion: Service Performed in Patient Hospital Room (and thus did not require transfer): N/A  Unplanned admission to ICU related to anesthesia service up through end of PACU care- (MD51)  Unplanned admission to ICU (not initially anticipated at anesthesia start time): No

## 2020-01-28 NOTE — ANESTHESIA TIME REPORT
Anesthesia Start and Stop Event Times     Date Time Event    1/28/2020 1159 Ready for Procedure     1254 Anesthesia Start        Responsible Staff  01/28/20    Name Role Begin End    Emile Lund M.D. Anesth 1254         Preop Diagnosis (Free Text):  Pre-op Diagnosis     COMBINED FORM CATARACT        Preop Diagnosis (Codes):    Post op Diagnosis  Combined forms of age-related cataract of right eye      Premium Reason  Non-Premium    Comments:

## 2020-01-28 NOTE — DISCHARGE INSTRUCTIONS
HOME CARE INSTRUCTIONS FOR CATARACT SURGERY    ACTIVITY: Rest and take it easy for the first 24 hours. We strongly suggest that a responsible adult remain with you during that time. It is normal to feel sleepy. We encourage you to not do anything that requires balance, judgment or coordination. Be extra careful when walking (with a dilated eye, it is easier to trip and fall).     FOR 24 HOURS, DO NOT:       Drive, operate machinery or run household appliances.        Drink beer or alcoholic beverages.        Make important decisions or sign legal documents.     DIET: To avoid nausea, slowly advance diet as tolerated, avoiding spicy or greasy foods for the first meal.     MEDICATIONS: Resume taking daily medication. You may take Tylenol for mild discomfort, if needed.     SURGICAL DRESSING: Eye shield as instructed by your doctor. Dark glasses should be worn while in the sunlight.     Follow your Physician's instruction Sheet. Eye Kit Given    A follow-up appointment is scheduled with your doctor tomorrow at __9:15_____ am    You should call 911 if you develop problems with breathing or chest pain.  You should CALL YOUR PHYSICIAN if you develop: Sharp stabbing pain or sudden change in vision in your operative eye. If you are unable to contact your doctor or the surgical center, you should go to the nearest emergency room or urgent care center. Physician's telephone # ___[phone]_______________________    If any questions arise, call your doctor. If your doctor is not available, please feel free to call Same Day Surgery at [phone]. You can also call the [employer] Hotline open 24 hours/day, 7 days/week and speak to a nurse at [phone] or [phone].     I acknowledge receipt and understanding of these Home Care Instructions.    ______________________________     _______________________________            Signature of Patient / Responsible Adult                                                       RN  Signature    A registered nurse may call you a few days after your surgery to see how you are doing.   You may also receive a survey in the mail within the next two weeks and we ask that you take a few moments to complete and return the survey. Our goal is to provide you with very good care and we value your comments. Thank you for choosing [facility].

## 2020-01-28 NOTE — OP REPORT
DATE OF SERVICE:  01/28/2020    PREOPERATIVE DIAGNOSIS:  Cataract, right eye.      POSTOPERATIVE DIAGNOSIS:  Cataract, right eye.      PROCEDURE:  Phacoemulsification with intraocular lens implant, right eye.      SURGEON:  Brent Jang MD    ANESTHESIA:  Local MAC.      PROSTHETIC DEVICES:  Hector and Hector Vision intraocular lens, model PCB00,   24.5 diopter, serial #[id number].      INDICATIONS:  The patient has a visually significant cataract in the right   eye.      DESCRIPTION OF OPERATION:  The patient was placed in the supine position on   the operating room table.  Appropriate anesthetic monitors were positioned.    The eye was prepped and draped in the usual sterile fashion for ocular surgery   using Betadine solution.  A wire lid speculum was positioned for exposure.  A   clear cornea temporal incision was made with a talha keratome and a   paracentesis was made with a talha knife.  The anterior chamber was filled   with viscoelastic and a continuous curvilinear capsulorrhexis was made with   the capsulorrhexis forceps.  The lens was hydrodissected and the nucleus was   removed using the phacoemulsification handpiece and the cortex was aspirated   with the IA handpiece.  The capsular bag was filled with viscoelastic and the   intraocular lens was positioned into the capsular bag.  Residual viscoelastic   was aspirated from the anterior chamber, and the wound was hydrated with   saline solution producing a watertight closure.  The wire lid speculum was   removed and the patient was taken to the recovery room in stable condition.       ____________________________________     BRENT JANG MD    CAM / NTS    DD:  01/28/2020 14:18:49  DT:  01/28/2020 14:23:37    D#:  [id number]  Job#:  [id number]

## 2020-01-28 NOTE — ANESTHESIA POSTPROCEDURE EVALUATION
Patient: Erasto Calhoun Jr.    Procedure Summary     Date:  01/28/20 Room / Location:  [facility] ROOM 27 / SURGERY SAME DAY [facility]    Anesthesia Start:  1254 Anesthesia Stop:  1319    Procedure:  PHACOEMULSIFICATION, CATARACT, WITH IOL INSERTION (Right Eye) Diagnosis:  (COMBINED FORM CATARACT)    Surgeon:  Kiran Jang M.D. Responsible Provider:  Emile Lund M.D.    Anesthesia Type:  MAC ASA Status:  2          Final Anesthesia Type: MAC  Last vitals  BP   Blood Pressure : 151/91    Temp   36.3 °C (97.4 °F)    Pulse   Pulse: 60   Resp   18    SpO2   95 %      Anesthesia Post Evaluation    Patient location during evaluation: PACU  Patient participation: complete - patient participated  Level of consciousness: awake and alert  Pain score: 0    Airway patency: patent  Anesthetic complications: no  Cardiovascular status: hemodynamically stable  Respiratory status: acceptable  Hydration status: euvolemic    PONV: none           Nurse Pain Score: 0 (NPRS)

## 2020-02-11 ENCOUNTER — HOSPITAL ENCOUNTER (OUTPATIENT)
Facility: MEDICAL CENTER | Age: 80
End: 2020-02-11
Attending: OPHTHALMOLOGY | Admitting: OPHTHALMOLOGY
Payer: MEDICARE

## 2020-02-11 ENCOUNTER — ANESTHESIA (OUTPATIENT)
Dept: SURGERY | Facility: MEDICAL CENTER | Age: 80
End: 2020-02-11
Payer: MEDICARE

## 2020-02-11 ENCOUNTER — ANESTHESIA EVENT (OUTPATIENT)
Dept: SURGERY | Facility: MEDICAL CENTER | Age: 80
End: 2020-02-11
Payer: MEDICARE

## 2020-02-11 VITALS
HEIGHT: 72 IN | TEMPERATURE: 97.8 F | BODY MASS INDEX: 22.9 KG/M2 | OXYGEN SATURATION: 94 % | WEIGHT: 169.09 LBS | RESPIRATION RATE: 16 BRPM | HEART RATE: 60 BPM

## 2020-02-11 PROCEDURE — 502240 HCHG MISC OR SUPPLY RC 0272: Performed by: OPHTHALMOLOGY

## 2020-02-11 PROCEDURE — 700111 HCHG RX REV CODE 636 W/ 250 OVERRIDE (IP): Performed by: ANESTHESIOLOGY

## 2020-02-11 PROCEDURE — 700111 HCHG RX REV CODE 636 W/ 250 OVERRIDE (IP): Mod: JW

## 2020-02-11 PROCEDURE — 160046 HCHG PACU - 1ST 60 MINS PHASE II: Performed by: OPHTHALMOLOGY

## 2020-02-11 PROCEDURE — 700101 HCHG RX REV CODE 250

## 2020-02-11 PROCEDURE — 700105 HCHG RX REV CODE 258: Performed by: OPHTHALMOLOGY

## 2020-02-11 PROCEDURE — 500855 HCHG NEEDLE, IRRIG CYSTOTOME 27G: Performed by: OPHTHALMOLOGY

## 2020-02-11 PROCEDURE — 160028 HCHG SURGERY MINUTES - 1ST 30 MINS LEVEL 3: Performed by: OPHTHALMOLOGY

## 2020-02-11 PROCEDURE — 160025 RECOVERY II MINUTES (STATS): Performed by: OPHTHALMOLOGY

## 2020-02-11 PROCEDURE — 700111 HCHG RX REV CODE 636 W/ 250 OVERRIDE (IP)

## 2020-02-11 PROCEDURE — 160009 HCHG ANES TIME/MIN: Performed by: OPHTHALMOLOGY

## 2020-02-11 PROCEDURE — 501539 HCHG TIP, PHACO: Performed by: OPHTHALMOLOGY

## 2020-02-11 PROCEDURE — 160048 HCHG OR STATISTICAL LEVEL 1-5: Performed by: OPHTHALMOLOGY

## 2020-02-11 PROCEDURE — 501749 HCHG SHELL REV 276: Performed by: OPHTHALMOLOGY

## 2020-02-11 DEVICE — LENS PRE-LOADED TECNIS CLEAR MONO 6.0MM 25D: Type: IMPLANTABLE DEVICE | Site: EYE | Status: FUNCTIONAL

## 2020-02-11 RX ORDER — ONDANSETRON 2 MG/ML
4 INJECTION INTRAMUSCULAR; INTRAVENOUS
Status: DISCONTINUED | OUTPATIENT
Start: 2020-02-11 | End: 2020-02-11 | Stop reason: HOSPADM

## 2020-02-11 RX ORDER — PHENYLEPHRINE HYDROCHLORIDE 25 MG/ML
SOLUTION/ DROPS OPHTHALMIC
Status: COMPLETED
Start: 2020-02-11 | End: 2020-02-11

## 2020-02-11 RX ORDER — TROPICAMIDE 10 MG/ML
SOLUTION/ DROPS OPHTHALMIC
Status: COMPLETED
Start: 2020-02-11 | End: 2020-02-11

## 2020-02-11 RX ORDER — SODIUM CHLORIDE, SODIUM LACTATE, POTASSIUM CHLORIDE, CALCIUM CHLORIDE 600; 310; 30; 20 MG/100ML; MG/100ML; MG/100ML; MG/100ML
INJECTION, SOLUTION INTRAVENOUS CONTINUOUS
Status: DISCONTINUED | OUTPATIENT
Start: 2020-02-11 | End: 2020-02-11 | Stop reason: HOSPADM

## 2020-02-11 RX ORDER — LABETALOL HYDROCHLORIDE 5 MG/ML
5 INJECTION, SOLUTION INTRAVENOUS
Status: DISCONTINUED | OUTPATIENT
Start: 2020-02-11 | End: 2020-02-11 | Stop reason: HOSPADM

## 2020-02-11 RX ORDER — EPINEPHRINE 1 MG/ML(1)
AMPUL (ML) INJECTION
Status: DISCONTINUED
Start: 2020-02-11 | End: 2020-02-11 | Stop reason: HOSPADM

## 2020-02-11 RX ORDER — MIDAZOLAM HYDROCHLORIDE 1 MG/ML
INJECTION INTRAMUSCULAR; INTRAVENOUS PRN
Status: DISCONTINUED | OUTPATIENT
Start: 2020-02-11 | End: 2020-02-11 | Stop reason: SURG

## 2020-02-11 RX ORDER — TETRACAINE HYDROCHLORIDE 5 MG/ML
SOLUTION OPHTHALMIC
Status: COMPLETED
Start: 2020-02-11 | End: 2020-02-11

## 2020-02-11 RX ADMIN — MIDAZOLAM HYDROCHLORIDE 2 MG: 1 INJECTION, SOLUTION INTRAMUSCULAR; INTRAVENOUS at 11:14

## 2020-02-11 RX ADMIN — TROPICAMIDE 1 DROP: 10 SOLUTION/ DROPS OPHTHALMIC at 10:47

## 2020-02-11 RX ADMIN — TETRACAINE HYDROCHLORIDE 1 DROP: 5 SOLUTION OPHTHALMIC at 10:45

## 2020-02-11 RX ADMIN — PHENYLEPHRINE HYDROCHLORIDE 1 DROP: 2.5 SOLUTION/ DROPS OPHTHALMIC at 10:46

## 2020-02-11 RX ADMIN — SODIUM CHLORIDE, POTASSIUM CHLORIDE, SODIUM LACTATE AND CALCIUM CHLORIDE: 600; 310; 30; 20 INJECTION, SOLUTION INTRAVENOUS at 10:55

## 2020-02-11 RX ADMIN — FENTANYL CITRATE 50 MCG: 50 INJECTION, SOLUTION INTRAMUSCULAR; INTRAVENOUS at 11:16

## 2020-02-11 ASSESSMENT — PAIN SCALES - GENERAL: PAIN_LEVEL: 0

## 2020-02-11 NOTE — ANESTHESIA QCDR
2019 [facility] Clinical Data Registry (for Quality Improvement)     Postoperative nausea/vomiting risk protocol (Adult = 18 yrs and Pediatric 3-17 yrs)- (430 and 463)  General inhalation anesthetic (NOT TIVA) with PONV risk factors: No  Provision of anti-emetic therapy with at least 2 different classes of agents: N/A  Patient DID NOT receive anti-emetic therapy and reason is documented in Medical Record: N/A    Multimodal Pain Management- (477)  Non-emergent surgery AND patient age >= 18: Yes  Use of Multimodal Pain Management, two or more drugs and/or interventions, NOT including systemic opioids: No  Exception: Documented allergy to multiple classes of analgesics: No       Smoking Abstinence (404)  Patient is current smoker (cigarette, pipe, e-cig, marijuanna): No  Elective Surgery:   Abstinence instructions provided prior to day of surgery:   Patient abstained from smoking on day of surgery:     Pre-Op Beta-Blocker in Isolated CABG (44)  Isolated CABG AND patient age >= 18: No  Beta-blocker admin within 24 hours of surgical incision:   Exception:of medical reason(s) for not administering beta blocker within 24 hours prior to surgical incision (e.g., not  indicated,other medical reason):     PACU assessment of acute postoperative pain prior to Anesthesia Care End- Applies to Patients Age = 18- (ABG7)  Initial PACU pain score is which of the following: < 7/10  Patient unable to report pain score: N/A    Post-anesthetic transfer of care checklist/protocol to PACU/ICU- (426 and 427)  Upon conclusion of case, patient transferred to which of the following locations: PACU/Non-ICU  Use of transfer checklist/protocol: Yes  Exclusion: Service Performed in Patient Hospital Room (and thus did not require transfer): N/A  Unplanned admission to ICU related to anesthesia service up through end of PACU care- (MD51)  Unplanned admission to ICU (not initially anticipated at anesthesia start time): No

## 2020-02-11 NOTE — DISCHARGE INSTRUCTIONS
HOME CARE INSTRUCTIONS FOR CATARACT SURGERY    ACTIVITY: Rest and take it easy for the first 24 hours. We strongly suggest that a responsible adult remain with you during that time. It is normal to feel sleepy. We encourage you to not do anything that requires balance, judgment or coordination. Be extra careful when walking (with a dilated eye, it is easier to trip and fall).     FOR 24 HOURS, DO NOT:       Drive, operate machinery or run household appliances.        Drink beer or alcoholic beverages.        Make important decisions or sign legal documents.     DIET: To avoid nausea, slowly advance diet as tolerated, avoiding spicy or greasy foods for the first meal.     MEDICATIONS: Resume taking daily medication. You may take Tylenol for mild discomfort, if needed.     SURGICAL DRESSING: Eye shield as instructed by your doctor. Dark glasses should be worn while in the sunlight.     Follow your Physician's instruction Sheet. Eye Kit Given    A follow-up appointment is scheduled with your doctor tomorrow at ___0900am_.     You should call 911 if you develop problems with breathing or chest pain.  You should CALL YOUR PHYSICIAN if you develop: Sharp stabbing pain or sudden change in vision in your operative eye. If you are unable to contact your doctor or the surgical center, you should go to the nearest emergency room or urgent care center. Physician's telephone # ___[phone]__________________    If any questions arise, call your doctor. If your doctor is not available, please feel free to call Same Day Surgery at [phone]. You can also call the [employer] Hotline open 24 hours/day, 7 days/week and speak to a nurse at [phone] or [phone].     I acknowledge receipt and understanding of these Home Care Instructions.    ______________________________     _______________________________            Signature of Patient / Responsible Adult                                                       RN  Signature    A registered nurse may call you a few days after your surgery to see how you are doing.   You may also receive a survey in the mail within the next two weeks and we ask that you take a few moments to complete and return the survey. Our goal is to provide you with very good care and we value your comments. Thank you for choosing [facility].

## 2020-02-11 NOTE — OP REPORT
DATE OF SERVICE:  02/11/2020    PREOPERATIVE DIAGNOSIS:  Cataract, left eye.    POSTOPERATIVE DIAGNOSIS:  Cataract, left eye.    PROCEDURE:  Phacoemulsification with intraocular lens implant, left eye.    SURGEON:  Brent Jang MD    ANESTHESIA:  Local MAC.    PROSTHETIC DEVICES:  Hector and Hector Vision intraocular lens, model PCB00,   25.0 diopter, serial #[id number].    INDICATIONS:  The patient has a visually significant cataract in the left eye.    DESCRIPTION OF OPERATION:  The patient was placed in the supine position on   the operating room table.  Appropriate anesthetic monitors were positioned.    The eye was prepped and draped in the usual sterile fashion for ocular surgery   using Betadine solution.  A wire lid speculum was positioned for exposure.  A   clear cornea temporal incision was made with a talha keratome and a   paracentesis was made with a talha knife.  The anterior chamber was filled   with viscoelastic and a continuous curvilinear capsulorrhexis was made with   the capsulorrhexis forceps.  The lens was hydrodissected and the nucleus was   removed using the phacoemulsification handpiece and the cortex was aspirated   with the IA handpiece.  The capsular bag was filled with viscoelastic and the   intraocular lens was positioned into the capsular bag.  Residual viscoelastic   was aspirated from the anterior chamber, and the wound was hydrated with   saline solution producing a watertight closure.  The wire lid speculum was   removed and the patient was taken to the recovery room in stable condition.       ____________________________________     BRENT JANG MD    CAM / NTS    DD:  02/11/2020 11:48:56  DT:  02/11/2020 12:11:19    D#:  [id number]  Job#:  [id number]

## 2020-02-11 NOTE — ANESTHESIA POSTPROCEDURE EVALUATION
Patient: Erasto Calhoun Jr.    Procedure Summary     Date:  02/11/20 Room / Location:  [facility] ROOM 27 / SURGERY SAME DAY [facility]    Anesthesia Start:  1108 Anesthesia Stop:  1132    Procedure:  PHACOEMULSIFICATION, CATARACT, WITH IOL INSERTION (Left Eye) Diagnosis:  (COMBINED FORM CATARACT)    Surgeon:  Kiran Jang M.D. Responsible Provider:  Emile Lund M.D.    Anesthesia Type:  MAC ASA Status:  2          Final Anesthesia Type: MAC  Last vitals  BP   NIBP: 151/76    Temp   36.6 °C (97.8 °F)    Pulse   Pulse: 78   Resp   16    SpO2   99 %      Anesthesia Post Evaluation    Patient location during evaluation: PACU  Patient participation: complete - patient participated  Level of consciousness: awake and alert  Pain score: 0    Airway patency: patent  Anesthetic complications: no  Cardiovascular status: hemodynamically stable  Respiratory status: acceptable  Hydration status: euvolemic    PONV: none           Nurse Pain Score: 0 (NPRS)

## 2020-02-11 NOTE — ANESTHESIA TIME REPORT
Anesthesia Start and Stop Event Times     Date Time Event    2/11/2020 1100 Ready for Procedure     1108 Anesthesia Start     1132 Anesthesia Stop        Responsible Staff  02/11/20    Name Role Begin End    Emile Lund M.D. Anesth 1108 1132        Preop Diagnosis (Free Text):  Pre-op Diagnosis     COMBINED FORM CATARACT        Preop Diagnosis (Codes):    Post op Diagnosis  Combined forms of age-related cataract of left eye      Premium Reason  Non-Premium    Comments:

## 2020-04-22 DIAGNOSIS — E78.2 MIXED HYPERLIPIDEMIA: ICD-10-CM

## 2020-04-22 RX ORDER — PRAVASTATIN SODIUM 40 MG
TABLET ORAL
Qty: 100 TAB | Refills: 0 | Status: ON HOLD | OUTPATIENT
Start: 2020-04-22 | End: 2020-06-16

## 2020-06-14 ENCOUNTER — HOSPITAL ENCOUNTER (OUTPATIENT)
Facility: MEDICAL CENTER | Age: 80
End: 2020-06-16
Attending: EMERGENCY MEDICINE | Admitting: INTERNAL MEDICINE
Payer: MEDICARE

## 2020-06-14 ENCOUNTER — APPOINTMENT (OUTPATIENT)
Dept: RADIOLOGY | Facility: MEDICAL CENTER | Age: 80
End: 2020-06-14
Attending: EMERGENCY MEDICINE
Payer: MEDICARE

## 2020-06-14 ENCOUNTER — APPOINTMENT (OUTPATIENT)
Dept: RADIOLOGY | Facility: MEDICAL CENTER | Age: 80
End: 2020-06-14
Attending: INTERNAL MEDICINE
Payer: MEDICARE

## 2020-06-14 ENCOUNTER — APPOINTMENT (OUTPATIENT)
Dept: CARDIOLOGY | Facility: MEDICAL CENTER | Age: 80
End: 2020-06-14
Attending: INTERNAL MEDICINE
Payer: MEDICARE

## 2020-06-14 DIAGNOSIS — G45.9 TIA (TRANSIENT ISCHEMIC ATTACK): ICD-10-CM

## 2020-06-14 DIAGNOSIS — I63.9 CEREBROVASCULAR ACCIDENT (CVA), UNSPECIFIED MECHANISM (HCC): ICD-10-CM

## 2020-06-14 PROBLEM — I77.810 ASCENDING AORTA DILATATION (HCC): Status: ACTIVE | Noted: 2020-06-14

## 2020-06-14 LAB
ABO GROUP BLD: NORMAL
ALBUMIN SERPL BCP-MCNC: 3.9 G/DL (ref 3.2–4.9)
ALBUMIN/GLOB SERPL: 1.3 G/DL
ALP SERPL-CCNC: 101 U/L (ref 30–99)
ALT SERPL-CCNC: 15 U/L (ref 2–50)
ANION GAP SERPL CALC-SCNC: 9 MMOL/L (ref 7–16)
APTT PPP: 27.6 SEC (ref 24.7–36)
AST SERPL-CCNC: 20 U/L (ref 12–45)
BASOPHILS # BLD AUTO: 0.5 % (ref 0–1.8)
BASOPHILS # BLD: 0.04 K/UL (ref 0–0.12)
BILIRUB SERPL-MCNC: 1.7 MG/DL (ref 0.1–1.5)
BLD GP AB SCN SERPL QL: NORMAL
BUN SERPL-MCNC: 25 MG/DL (ref 8–22)
CALCIUM SERPL-MCNC: 9.4 MG/DL (ref 8.5–10.5)
CHLORIDE SERPL-SCNC: 104 MMOL/L (ref 96–112)
CO2 SERPL-SCNC: 23 MMOL/L (ref 20–33)
CREAT SERPL-MCNC: 0.96 MG/DL (ref 0.5–1.4)
EKG IMPRESSION: NORMAL
EOSINOPHIL # BLD AUTO: 0.09 K/UL (ref 0–0.51)
EOSINOPHIL NFR BLD: 1.2 % (ref 0–6.9)
ERYTHROCYTE [DISTWIDTH] IN BLOOD BY AUTOMATED COUNT: 51.8 FL (ref 35.9–50)
GLOBULIN SER CALC-MCNC: 3.1 G/DL (ref 1.9–3.5)
GLUCOSE BLD-MCNC: 86 MG/DL (ref 65–99)
GLUCOSE SERPL-MCNC: 103 MG/DL (ref 65–99)
HCT VFR BLD AUTO: 44.2 % (ref 42–52)
HGB BLD-MCNC: 13.9 G/DL (ref 14–18)
IMM GRANULOCYTES # BLD AUTO: 0.01 K/UL (ref 0–0.11)
IMM GRANULOCYTES NFR BLD AUTO: 0.1 % (ref 0–0.9)
INR PPP: 1.03 (ref 0.87–1.13)
LV EJECT FRACT  99904: 60
LV EJECT FRACT MOD 2C 99903: 55.22
LV EJECT FRACT MOD 4C 99902: 56.88
LV EJECT FRACT MOD BP 99901: 55.53
LYMPHOCYTES # BLD AUTO: 3.05 K/UL (ref 1–4.8)
LYMPHOCYTES NFR BLD: 40.2 % (ref 22–41)
MCH RBC QN AUTO: 32.1 PG (ref 27–33)
MCHC RBC AUTO-ENTMCNC: 31.4 G/DL (ref 33.7–35.3)
MCV RBC AUTO: 102.1 FL (ref 81.4–97.8)
MONOCYTES # BLD AUTO: 0.82 K/UL (ref 0–0.85)
MONOCYTES NFR BLD AUTO: 10.8 % (ref 0–13.4)
NEUTROPHILS # BLD AUTO: 3.57 K/UL (ref 1.82–7.42)
NEUTROPHILS NFR BLD: 47.2 % (ref 44–72)
NRBC # BLD AUTO: 0 K/UL
NRBC BLD-RTO: 0 /100 WBC
PLATELET # BLD AUTO: 247 K/UL (ref 164–446)
PMV BLD AUTO: 10.6 FL (ref 9–12.9)
POTASSIUM SERPL-SCNC: 3.9 MMOL/L (ref 3.6–5.5)
PROT SERPL-MCNC: 7 G/DL (ref 6–8.2)
PROTHROMBIN TIME: 13.7 SEC (ref 12–14.6)
RBC # BLD AUTO: 4.33 M/UL (ref 4.7–6.1)
RH BLD: NORMAL
SODIUM SERPL-SCNC: 136 MMOL/L (ref 135–145)
TROPONIN T SERPL-MCNC: 12 NG/L (ref 6–19)
WBC # BLD AUTO: 7.6 K/UL (ref 4.8–10.8)

## 2020-06-14 PROCEDURE — 700102 HCHG RX REV CODE 250 W/ 637 OVERRIDE(OP): Performed by: INTERNAL MEDICINE

## 2020-06-14 PROCEDURE — A9270 NON-COVERED ITEM OR SERVICE: HCPCS | Performed by: INTERNAL MEDICINE

## 2020-06-14 PROCEDURE — 93306 TTE W/DOPPLER COMPLETE: CPT

## 2020-06-14 PROCEDURE — 99205 OFFICE O/P NEW HI 60 MIN: CPT | Performed by: NURSE PRACTITIONER

## 2020-06-14 PROCEDURE — 86901 BLOOD TYPING SEROLOGIC RH(D): CPT

## 2020-06-14 PROCEDURE — 99285 EMERGENCY DEPT VISIT HI MDM: CPT

## 2020-06-14 PROCEDURE — 85610 PROTHROMBIN TIME: CPT

## 2020-06-14 PROCEDURE — 99220 PR INITIAL OBSERVATION CARE,LEVL III: CPT | Performed by: INTERNAL MEDICINE

## 2020-06-14 PROCEDURE — 700117 HCHG RX CONTRAST REV CODE 255: Performed by: EMERGENCY MEDICINE

## 2020-06-14 PROCEDURE — 85730 THROMBOPLASTIN TIME PARTIAL: CPT

## 2020-06-14 PROCEDURE — 86850 RBC ANTIBODY SCREEN: CPT

## 2020-06-14 PROCEDURE — 700102 HCHG RX REV CODE 250 W/ 637 OVERRIDE(OP): Performed by: EMERGENCY MEDICINE

## 2020-06-14 PROCEDURE — 70551 MRI BRAIN STEM W/O DYE: CPT

## 2020-06-14 PROCEDURE — 71045 X-RAY EXAM CHEST 1 VIEW: CPT

## 2020-06-14 PROCEDURE — 82962 GLUCOSE BLOOD TEST: CPT

## 2020-06-14 PROCEDURE — 80053 COMPREHEN METABOLIC PANEL: CPT

## 2020-06-14 PROCEDURE — 93005 ELECTROCARDIOGRAM TRACING: CPT | Performed by: EMERGENCY MEDICINE

## 2020-06-14 PROCEDURE — 86900 BLOOD TYPING SEROLOGIC ABO: CPT

## 2020-06-14 PROCEDURE — 93306 TTE W/DOPPLER COMPLETE: CPT | Mod: 26 | Performed by: INTERNAL MEDICINE

## 2020-06-14 PROCEDURE — 70498 CT ANGIOGRAPHY NECK: CPT

## 2020-06-14 PROCEDURE — G0378 HOSPITAL OBSERVATION PER HR: HCPCS

## 2020-06-14 PROCEDURE — 85025 COMPLETE CBC W/AUTO DIFF WBC: CPT

## 2020-06-14 PROCEDURE — 84484 ASSAY OF TROPONIN QUANT: CPT

## 2020-06-14 PROCEDURE — 70450 CT HEAD/BRAIN W/O DYE: CPT

## 2020-06-14 PROCEDURE — A9270 NON-COVERED ITEM OR SERVICE: HCPCS | Performed by: EMERGENCY MEDICINE

## 2020-06-14 PROCEDURE — 93005 ELECTROCARDIOGRAM TRACING: CPT | Performed by: HOSPITALIST

## 2020-06-14 PROCEDURE — 93010 ELECTROCARDIOGRAM REPORT: CPT | Performed by: INTERNAL MEDICINE

## 2020-06-14 PROCEDURE — 70496 CT ANGIOGRAPHY HEAD: CPT

## 2020-06-14 RX ORDER — ACETAMINOPHEN 325 MG/1
650 TABLET ORAL EVERY 6 HOURS PRN
Status: DISCONTINUED | OUTPATIENT
Start: 2020-06-14 | End: 2020-06-16 | Stop reason: HOSPADM

## 2020-06-14 RX ORDER — ATORVASTATIN CALCIUM 80 MG/1
80 TABLET, FILM COATED ORAL EVERY EVENING
Status: DISCONTINUED | OUTPATIENT
Start: 2020-06-14 | End: 2020-06-16 | Stop reason: HOSPADM

## 2020-06-14 RX ORDER — ASPIRIN 81 MG/1
324 TABLET, CHEWABLE ORAL ONCE
Status: COMPLETED | OUTPATIENT
Start: 2020-06-14 | End: 2020-06-14

## 2020-06-14 RX ORDER — POLYETHYLENE GLYCOL 3350 17 G/17G
1 POWDER, FOR SOLUTION ORAL
Status: DISCONTINUED | OUTPATIENT
Start: 2020-06-14 | End: 2020-06-16 | Stop reason: HOSPADM

## 2020-06-14 RX ORDER — ASCORBIC ACID 500 MG
1000 TABLET ORAL EVERY MORNING
Status: SHIPPED | COMMUNITY
End: 2021-11-10

## 2020-06-14 RX ORDER — BISACODYL 10 MG
10 SUPPOSITORY, RECTAL RECTAL
Status: DISCONTINUED | OUTPATIENT
Start: 2020-06-14 | End: 2020-06-16 | Stop reason: HOSPADM

## 2020-06-14 RX ORDER — ASPIRIN 325 MG
325 TABLET ORAL DAILY
Status: DISCONTINUED | OUTPATIENT
Start: 2020-06-14 | End: 2020-06-15

## 2020-06-14 RX ORDER — ASPIRIN 300 MG/1
300 SUPPOSITORY RECTAL DAILY
Status: DISCONTINUED | OUTPATIENT
Start: 2020-06-14 | End: 2020-06-15

## 2020-06-14 RX ORDER — ASPIRIN 81 MG/1
324 TABLET, CHEWABLE ORAL DAILY
Status: DISCONTINUED | OUTPATIENT
Start: 2020-06-14 | End: 2020-06-15

## 2020-06-14 RX ORDER — AMOXICILLIN 250 MG
2 CAPSULE ORAL 2 TIMES DAILY
Status: DISCONTINUED | OUTPATIENT
Start: 2020-06-14 | End: 2020-06-16 | Stop reason: HOSPADM

## 2020-06-14 RX ADMIN — IOHEXOL 80 ML: 350 INJECTION, SOLUTION INTRAVENOUS at 10:25

## 2020-06-14 RX ADMIN — ATORVASTATIN CALCIUM 80 MG: 80 TABLET, FILM COATED ORAL at 18:25

## 2020-06-14 RX ADMIN — ASPIRIN 325 MG: 325 TABLET, FILM COATED ORAL at 13:55

## 2020-06-14 RX ADMIN — ASPIRIN 81 MG 324 MG: 81 TABLET ORAL at 11:03

## 2020-06-14 ASSESSMENT — ENCOUNTER SYMPTOMS
FOCAL WEAKNESS: 1
NECK PAIN: 0
NAUSEA: 0
TINGLING: 0
WEIGHT LOSS: 0
VOMITING: 0
SENSORY CHANGE: 0
COUGH: 0
ORTHOPNEA: 0
BLURRED VISION: 0
FEVER: 0
MYALGIAS: 0
TREMORS: 0
HALLUCINATIONS: 0
HEADACHES: 0
PHOTOPHOBIA: 0
CHILLS: 0
SHORTNESS OF BREATH: 0
CONSTIPATION: 0
SPEECH CHANGE: 1
ABDOMINAL PAIN: 0
DOUBLE VISION: 0
SPUTUM PRODUCTION: 0
EYE PAIN: 0
DIZZINESS: 0
BACK PAIN: 0
DIARRHEA: 0
PALPITATIONS: 0

## 2020-06-14 ASSESSMENT — LIFESTYLE VARIABLES
EVER HAD A DRINK FIRST THING IN THE MORNING TO STEADY YOUR NERVES TO GET RID OF A HANGOVER: NO
TOTAL SCORE: 0
HOW MANY TIMES IN THE PAST YEAR HAVE YOU HAD 5 OR MORE DRINKS IN A DAY: 0
AVERAGE NUMBER OF DAYS PER WEEK YOU HAVE A DRINK CONTAINING ALCOHOL: 0
HAVE YOU EVER FELT YOU SHOULD CUT DOWN ON YOUR DRINKING: NO
HAVE PEOPLE ANNOYED YOU BY CRITICIZING YOUR DRINKING: NO
ON A TYPICAL DAY WHEN YOU DRINK ALCOHOL HOW MANY DRINKS DO YOU HAVE: 0
ALCOHOL_USE: YES
TOTAL SCORE: 0
EVER FELT BAD OR GUILTY ABOUT YOUR DRINKING: NO
EVER HAD A DRINK FIRST THING IN THE MORNING TO STEADY YOUR NERVES TO GET RID OF A HANGOVER: NO
ON A TYPICAL DAY WHEN YOU DRINK ALCOHOL HOW MANY DRINKS DO YOU HAVE: 0
HAVE PEOPLE ANNOYED YOU BY CRITICIZING YOUR DRINKING: NO
AVERAGE NUMBER OF DAYS PER WEEK YOU HAVE A DRINK CONTAINING ALCOHOL: 0
SUBSTANCE_ABUSE: 0
CONSUMPTION TOTAL: NEGATIVE
EVER_SMOKED: YES
ALCOHOL_USE: NO
TOTAL SCORE: 0
HOW MANY TIMES IN THE PAST YEAR HAVE YOU HAD 5 OR MORE DRINKS IN A DAY: 0
DOES PATIENT WANT TO STOP DRINKING: NO
CONSUMPTION TOTAL: INCOMPLETE
HAVE YOU EVER FELT YOU SHOULD CUT DOWN ON YOUR DRINKING: NO

## 2020-06-14 ASSESSMENT — COPD QUESTIONNAIRES
DO YOU EVER COUGH UP ANY MUCUS OR PHLEGM?: NO/ONLY WITH OCCASIONAL COLDS OR INFECTIONS
HAVE YOU SMOKED AT LEAST 100 CIGARETTES IN YOUR ENTIRE LIFE: NO/DON'T KNOW
IN THE PAST 12 MONTHS DO YOU DO LESS THAN YOU USED TO BECAUSE OF YOUR BREATHING PROBLEMS: DISAGREE/UNSURE
DURING THE PAST 4 WEEKS HOW MUCH DID YOU FEEL SHORT OF BREATH: NONE/LITTLE OF THE TIME
COPD SCREENING SCORE: 2

## 2020-06-14 ASSESSMENT — PATIENT HEALTH QUESTIONNAIRE - PHQ9
SUM OF ALL RESPONSES TO PHQ9 QUESTIONS 1 AND 2: 0
SUM OF ALL RESPONSES TO PHQ9 QUESTIONS 1 AND 2: 0
2. FEELING DOWN, DEPRESSED, IRRITABLE, OR HOPELESS: NOT AT ALL
1. LITTLE INTEREST OR PLEASURE IN DOING THINGS: NOT AT ALL
SUM OF ALL RESPONSES TO PHQ9 QUESTIONS 1 AND 2: 0
1. LITTLE INTEREST OR PLEASURE IN DOING THINGS: NOT AT ALL
2. FEELING DOWN, DEPRESSED, IRRITABLE, OR HOPELESS: NOT AT ALL
1. LITTLE INTEREST OR PLEASURE IN DOING THINGS: NOT AT ALL
2. FEELING DOWN, DEPRESSED, IRRITABLE, OR HOPELESS: NOT AT ALL

## 2020-06-14 ASSESSMENT — FIBROSIS 4 INDEX: FIB4 SCORE: 1.67

## 2020-06-14 NOTE — ED PROVIDER NOTES
ED Provider Note    Scribed for Evin Wick M.D. by Ozzie Herrera. 6/14/2020, 9:56 AM.    Primary care provider: Azul Cash P.A.-C.  Means of arrival: walk-in  History obtained from: patient  History limited by: none    CHIEF COMPLAINT  Chief Complaint   Patient presents with   • Lightheadedness   • Dizziness   • Aphasia       HPI  Edmaicol Calhoun Jr. is a 79 y.o. male who presents to the Emergency Department with concerns for a possible stroke. Patient reports that he was watering his lawn around 8:45 AM at which time he became lightheaded with blurred vision, slurred speech, and expressive aphasia. These symptoms lasted for about 20 minutes and then resolved. This has not happened to him in the past. He denies any history of stroke. He does not have any diabetes, and he does not take anticoagulants. He states he had a mild headache at one point, but this resolved. He did not feel as though he would lose consciousness at any point. He further denies any chest pain or abdominal pain.     PPE Note: I personally donned full PPE for all patient encounters during this visit, including being clean-shaven with an N95 respirator mask, gloves, and eye protection. Scribe remained outside the patient's room and did not have any contact with the patient for the duration of patient encounter.       REVIEW OF SYSTEMS  Pertinent positives include lightheadedness, blurred vision, slurred speech, expressive aphasia (resolved). Pertinent negatives include no chest pain, abdominal pain, or presyncope.  All other systems reviewed and negative.    PAST MEDICAL HISTORY   has a past medical history of Cataract (01/22/2020), Dental disorder (01/22/2020), High cholesterol (01/22/2020), and Lupus (systemic lupus erythematosus) (HCC).    SURGICAL HISTORY   has a past surgical history that includes other surgical procedure (Right, 1972); cast application; cataract phaco with iol (Right, 1/28/2020); and cataract phaco with iol  (Left, 2020).    SOCIAL HISTORY  Social History     Tobacco Use   • Smoking status: Former Smoker     Packs/day: 1.00     Years: 4.00     Pack years: 4.00     Types: Cigarettes     Last attempt to quit: 1962     Years since quittin.4   • Smokeless tobacco: Never Used   • Tobacco comment: quit    Substance Use Topics   • Alcohol use: Yes     Alcohol/week: 0.6 oz     Types: 1 Shots of liquor per week     Comment: 1 per week   • Drug use: No      Social History     Substance and Sexual Activity   Drug Use No       FAMILY HISTORY  Family History   Problem Relation Age of Onset   • Cancer Mother         skin cancer   • Stroke Mother 92   • Diabetes Mother    • Hypertension Father    • Heart Disease Father 55        father passed from heart attack   • No Known Problems Brother    • No Known Problems Brother    • Cancer Brother         throat ca   • Alcohol/Drug Brother         heavy drinker       CURRENT MEDICATIONS  Current Outpatient Medications   Medication Instructions   • Ascorbic Acid (VITAMIN C PO) Oral   • Calcium Carb-Cholecalciferol (CALCIUM 600 + D PO) Oral   • multivitamin (THERAGRAN) Tab 1 Tab, Oral, DAILY   • pravastatin (PRAVACHOL) 40 MG tablet TAKE 1 TABLET BY MOUTH ONCE DAILY AT 6PM     ALLERGIES  No Known Allergies    PHYSICAL EXAM  VITAL SIGNS: /91   Pulse 78   Temp 36.1 °C (96.9 °F) (Temporal)   Resp 16   Ht 1.829 m (6')   Wt 72 kg (158 lb 11.7 oz)   SpO2 95%   BMI 21.53 kg/m²     Constitutional: Well developed, Well nourished, No acute distress, Non-toxic appearance.   HENT: Normocephalic, Atraumatic, mucous membranes moist, no erythema, exudates, swelling, or masses, nares patent  Eyes: nonicteric  Neck: Supple, no meningismus  Lymphatic: No lymphadenopathy noted.   Cardiovascular: Regular rate and rhythm, no gallops rubs or murmurs  Lungs: Clear bilaterally   Abdomen: Bowel sounds normal, Soft, No tenderness  Skin: Warm, Dry, no rash  Back: No tenderness, No CVA  tenderness.   Genitalia: Deferred  Rectal: Deferred  Extremities: No edema  Neurologic: Alert, appropriate, follows commands. NIH stroke scale = 0. No drift, no neglect, speech normal, visual fields intact, normal cerebellar function, CN 2-12 intact.  Psychiatric: Affect normal      DIAGNOSTIC STUDIES / PROCEDURES    LABS  Results for orders placed or performed during the hospital encounter of 06/14/20   CBC WITH DIFFERENTIAL   Result Value Ref Range    WBC 7.6 4.8 - 10.8 K/uL    RBC 4.33 (L) 4.70 - 6.10 M/uL    Hemoglobin 13.9 (L) 14.0 - 18.0 g/dL    Hematocrit 44.2 42.0 - 52.0 %    .1 (H) 81.4 - 97.8 fL    MCH 32.1 27.0 - 33.0 pg    MCHC 31.4 (L) 33.7 - 35.3 g/dL    RDW 51.8 (H) 35.9 - 50.0 fL    Platelet Count 247 164 - 446 K/uL    MPV 10.6 9.0 - 12.9 fL    Neutrophils-Polys 47.20 44.00 - 72.00 %    Lymphocytes 40.20 22.00 - 41.00 %    Monocytes 10.80 0.00 - 13.40 %    Eosinophils 1.20 0.00 - 6.90 %    Basophils 0.50 0.00 - 1.80 %    Immature Granulocytes 0.10 0.00 - 0.90 %    Nucleated RBC 0.00 /100 WBC    Neutrophils (Absolute) 3.57 1.82 - 7.42 K/uL    Lymphs (Absolute) 3.05 1.00 - 4.80 K/uL    Monos (Absolute) 0.82 0.00 - 0.85 K/uL    Eos (Absolute) 0.09 0.00 - 0.51 K/uL    Baso (Absolute) 0.04 0.00 - 0.12 K/uL    Immature Granulocytes (abs) 0.01 0.00 - 0.11 K/uL    NRBC (Absolute) 0.00 K/uL   PROTHROMBIN TIME   Result Value Ref Range    PT 13.7 12.0 - 14.6 sec    INR 1.03 0.87 - 1.13   APTT   Result Value Ref Range    APTT 27.6 24.7 - 36.0 sec   ACCU-CHEK GLUCOSE   Result Value Ref Range    Glucose - Accu-Ck 86 65 - 99 mg/dL      All labs reviewed by me.    EKG  Obtained at 1018  Normal Sinus rhythm   Rate 65  Axis normal   Intervals normal   No ST segment elevation or depression.        RADIOLOGY  CT-CTA NECK WITH & W/O-POST PROCESSING   Final Result      Atherosclerotic plaque in the common carotid arteries, carotid bulbs and proximal internal carotid arteries with less than 50% stenosis.      Mild  plaque at the origins of the vertebral arteries bilaterally.      Multiple borderline cervical lymph nodes bilaterally are nonspecific.      Dilated ascending aorta measuring 4 cm.         CT-CTA HEAD WITH & W/O-POST PROCESS   Final Result      No thrombosis is seen within the [place].         CT-HEAD W/O   Final Result      Small area of right occipital hypodensity may represent a subacute to chronic infarct.      There are periventricular and subcortical white matter changes present.  This finding is nonspecific and could be from indeterminate age small vessel ischemia, demyelination, or gliosis.      Focal hypodensities in the basal ganglia and left thalamus are likely related to prior lacunar infarcts..      DX-CHEST-PORTABLE (1 VIEW)    (Results Pending)     The radiologist's interpretation of all radiological studies have been reviewed by me.    COURSE & MEDICAL DECISION MAKING  Nursing notes, VS, PMSFHx reviewed in chart.     9:56 AM Patient seen and examined at bedside. Ordered for DX-chest, CT-head w/o, CT-cerebral perfusion, CTA-head w/wo, CTA-neck w/wo, CBC w/ diff, CMP, troponin, COD, PT, APTT, and EKG to evaluate.     10:46 AM Paged hospitalist and neurology. Ordered Asprin 324 mg to treat.     11:04 AM - I discussed the patient's case and the above findings with Dr. Lewis (Neurology) who agrees with the plan for admission.     I discussed the patient's case and the above findings with Dr. Kyle Ferguson (Hospitalist) who accepts the patient for hospitalization.      Decision Making:  This is a 79 y.o. year old male who presents with what appears to be a TIA.  The patient's symptoms have resolved and I gave him a stroke score of 0.  He does have evidence of subacute and chronic lacunar infarcts.  There is no evidence of acute clot on CTA.  He was given an aspirin here.  He will be admitted for further work-up of TIA.    DISPOSITION:  Patient will be hospitalized by Dr. Warren    FINAL  IMPRESSION  1. TIA (transient ischemic attack)          Ozzie JOSEPH (Scribe), am scribing for, and in the presence of, Evin Wick M.D..    Electronically signed by: Ozzie Herrera (Scribe), 6/14/2020    Evin JOSEPH M.D. personally performed the services described in this documentation, as scribed by Ozzie Herrera in my presence, and it is both accurate and complete.    The note accurately reflects work and decisions made by me.  Evin Wick M.D.  6/14/2020  11:05 AM

## 2020-06-14 NOTE — CONSULTS
Chief Complaint   Patient presents with   • Lightheadedness   • Dizziness   • Aphasia       Problem List Items Addressed This Visit     TIA (transient ischemic attack)     He presented with symptoms of slurred speech, left arm weakness and at the time of evaluation his symptoms resolved.  Neurology consulted by ER physician.  He underwent CT head without contrast and it showed a small area of right occipital hypodensity may represent a subacute to chronic infarct.  Evaluated for that I ordered MRI brain without contrast.  I ordered echocardiogram.  Admit to telemetry for close observation.  Every 4 hours neuro check per  Permissive hypertension  I discussed plan of care with him and his daughter at the bedside.         Relevant Medications    atorvastatin (LIPITOR) tablet 80 mg (Start on 6/14/2020  6:00 PM)    Other Relevant Orders    REFERRAL TO PHYSIATRY (PMR)      Neurology Stroke Alert Consultation    History of present illness:  This is an 79-year old male with PMHX significant only for dyslipidemia, ?SLE (not currently on medication/DMARDs) who presented to [facility] on 6/14/20 for a chief complaint of dizziness, slurred speech and double vision, transient/resolved at time of presentation. The patient reports that he awoke in his usual state of health this morning; around 0815, he was outside watering his garden when he suddenly began to experience dizziness (further described as light-headed sensation) and double vision; shortly thereafter, patient's daughter noted slurred speech. Symptoms reportedly persisted for 20-30 minutes, on arrival here symptoms were completely resolved and NIHSS 0. CT head unremarkable; CTA head/neck with no LVO. SBP 170s, BG WNL. Patient ultimately determined not to be a candidate for IV tPA secondary to mild/non-disabling/resolved deficits and NIHSS 0.   Currently, patient sitting up in stretcher, awake and alert. Denies headache or dizziness, denies focal weakness,  numbness, paresthesia, problem with vision, speech or swallowing.     Neurology has been consulted by Dr. Mary Reed to further evaluate the findings noted above.     Past medical history:   Past Medical History:   Diagnosis Date   • Cataract 01/22/2020    bilateral   • Dental disorder 01/22/2020    upper and lower dentures   • High cholesterol 01/22/2020    on med   • Lupus (systemic lupus erythematosus) (HCC)     Pt reports only symptom was rash, on plaquenil for several yrs, stopped 2004       Past surgical history:   Past Surgical History:   Procedure Laterality Date   • CATARACT PHACO WITH IOL Left 2/11/2020    Procedure: PHACOEMULSIFICATION, CATARACT, WITH IOL INSERTION;  Surgeon: Kiran Jang M.D.;  Location: [facility]ERY [facility];  Service: Ophthalmology   • CATARACT PHACO WITH IOL Right 1/28/2020    Procedure: PHACOEMULSIFICATION, CATARACT, WITH IOL INSERTION;  Surgeon: Kiran Jang M.D.;  Location: [facility]ERY [facility];  Service: Ophthalmology   • OTHER SURGICAL PROCEDURE Right 1972    right heel injury   • CAST APPLICATION      climbing, collar bone injury (7th grade)   • OPEN REDUCTION         Family history:   Family History   Problem Relation Age of Onset   • Cancer Mother         skin cancer   • Stroke Mother 92   • Diabetes Mother    • Hypertension Father    • Heart Disease Father 55        father passed from heart attack   • No Known Problems Brother    • No Known Problems Brother    • Cancer Brother         throat ca   • Alcohol/Drug Brother         heavy drinker       Social history:   Social History     Socioeconomic History   • Marital status:      Spouse name: Not on file   • Number of children: Not on file   • Years of education: Not on file   • Highest education level: Not on file   Occupational History   • Not on file   Social Needs   • Financial resource strain: Not on file   • Food insecurity     Worry: Not on file     Inability: Not on file   •  Transportation needs     Medical: No     Non-medical: No   Tobacco Use   • Smoking status: Former Smoker     Packs/day: 1.00     Years: 4.00     Pack years: 4.00     Types: Cigarettes     Last attempt to quit: 1962     Years since quittin.4   • Smokeless tobacco: Never Used   • Tobacco comment: quit    Substance and Sexual Activity   • Alcohol use: Yes     Alcohol/week: 0.6 oz     Types: 1 Shots of liquor per week     Comment: 1 per week   • Drug use: No   • Sexual activity: Not Currently     Partners: Female   Lifestyle   • Physical activity     Days per week: Not on file     Minutes per session: Not on file   • Stress: Not on file   Relationships   • Social connections     Talks on phone: Not on file     Gets together: Not on file     Attends Confucianism service: Not on file     Active member of club or organization: Not on file     Attends meetings of clubs or organizations: Not on file     Relationship status: Not on file   • Intimate partner violence     Fear of current or ex partner: Not on file     Emotionally abused: Not on file     Physically abused: Not on file     Forced sexual activity: Not on file   Other Topics Concern   • Not on file   Social History Narrative   • Not on file       Current medications:   Current Facility-Administered Medications   Medication Dose   • senna-docusate (PERICOLACE or SENOKOT S) 8.6-50 MG per tablet 2 Tab  2 Tab    And   • polyethylene glycol/lytes (MIRALAX) PACKET 1 Packet  1 Packet    And   • magnesium hydroxide (MILK OF MAGNESIA) suspension 30 mL  30 mL    And   • bisacodyl (DULCOLAX) suppository 10 mg  10 mg   • acetaminophen (TYLENOL) tablet 650 mg  650 mg   • Pharmacy consult request - Allow for permissive hypertension: SBP up to 220 mmHg/DBP up to 120 mmHg x 48 hours     • aspirin (ASA) tablet 325 mg  325 mg    Or   • aspirin (ASA) chewable tab 324 mg  324 mg    Or   • aspirin (ASA) suppository 300 mg  300 mg   • atorvastatin (LIPITOR) tablet 80 mg  80 mg        Medication Allergy:  No Known Allergies    Review of systems:   Constitutional: denies fever, night sweats, weight loss.   Eyes: denies acute vision change, eye pain or secretion.   Ears, Nose, Mouth, Throat: denies nasal secretion, nasal bleeding, difficulty swallowing, hearing loss, tinnitus, vertigo, ear pain, acute dental problems, oral ulcers or lesions.   Endocrine: denies recent weight changes, heat or cold intolerance, polyuria, polydypsia, polyphagia,abnormal hair growth.  Cardiovascular: denies new onset of chest pain, palpitations, syncope, or dyspnea of exertion.  Pulmonary: denies shortness of breath, new onset of cough, hemoptysis, wheezing, chest pain or flu-like symptoms.   GI: denies nausea, vomiting, diarrhea, GI bleeding, change in appetite, abdominal pain, and change in bowel habits.  : denies dysuria, urinary incontinence, hematuria.  Heme/oncology: denies history of easy bruising or bleeding. No history of cancer, DVTor PE.  Allergy/immunology: denies hives/urticaria, or itching.   Dermatologic: denies new rash, or new skin lesions.  Musculoskeletal:denies joint swelling or pain, muscle pain, neck and back pain.   Neurologic:As noted above.   Psychiatric: denies symptoms of depression, anxiety, hallucinations, mood swings or changes, suicidal or homicidal thoughts.       Physical examination:   Vitals:    06/14/20 0944 06/14/20 0947 06/14/20 1141   BP: 151/91  (!) 179/85   Pulse: 78  61   Resp: 16  20   Temp: 36.1 °C (96.9 °F)  36.2 °C (97.1 °F)   TempSrc: Temporal  Temporal   SpO2: 95%  95%   Weight:  72 kg (158 lb 11.7 oz) 74.8 kg (164 lb 14.5 oz)   Height: 1.829 m (6')  1.829 m (6')     General: Patient in no acute distress, pleasant and cooperative.  HEENT: Normocephalic, no signs of acute trauma.   Neck: supple, no meningeal signs or carotid bruits. There is normal range of motion. No tenderness on exam.   Chest: clear to auscultation. No cough.   CV: RRR, no murmurs.   Skin: no  signs of acute rashes or trauma.   Musculoskeletal: joints exhibit full range of motion, without any pain to palpation. There are no signs of joint or muscle swelling. There is no tenderness to deep palpation of muscles.   Psychiatric: No hallucinatory behavior. Denies symptoms of depression or suicidal ideation. Mood and affect appear normal on exam.     NEUROLOGICAL EXAM:   Mental status, orientation: Awake, alert and fully oriented.   Speech and language: speech is clear and fluent. The patient is able to name, repeat and comprehend.   Memory: There is intact recollection of recent and remote events.   Cranial nerve exam: Pupils are 3-4 mm bilaterally and equally reactive to light. Visual fields are intact by confrontation. There is no nystagmus on primary or secondary gaze. Intact full EOM in all directions of gaze. Face appears symmetric. Sensation in the face is intact to light touch. Uvula is midline. Palate elevates symmetrically. Tongue is midline and without any signs of tongue biting or fasciculations. Sternocleidomastoid muscles exhibit is normal strength bilaterally. Shoulder shrug is intact bilaterally.   Motor exam: Strength is 5/5 in all extremities. Tone is normal. No abnormal movements were seen on exam.   Sensory exam reveals normal sense of light touch and pinprick in all extremities.   Deep tendon reflexes:  2+ throughout. Plantar responses are flexor. There is no clonus.   Coordination: shows a normal finger-nose-finger. Normal rapidly alternating movements.   Gait: Not assessed at this time as patient is a fall risk.       NIH Stroke Scale    1a Level of Consciousness   1b Orientation Questions   1c Response to Commands   2 Gaze   3 Visual Fields   4 Facial Movement   5 Motor Function (arm)   a Left   b Right   6 Motor Function (leg)   a Left   b Right   7 Limb Ataxia   8 Sensory   9 Language   10 Articulation   11 Extinction/Inattention     Score: 0      ANCILLARY DATA REVIEWED:     Lab Data  Review:  Recent Results (from the past 24 hour(s))   ACCU-CHEK GLUCOSE    Collection Time: 06/14/20  9:57 AM   Result Value Ref Range    Glucose - Accu-Ck 86 65 - 99 mg/dL   CBC WITH DIFFERENTIAL    Collection Time: 06/14/20  9:58 AM   Result Value Ref Range    WBC 7.6 4.8 - 10.8 K/uL    RBC 4.33 (L) 4.70 - 6.10 M/uL    Hemoglobin 13.9 (L) 14.0 - 18.0 g/dL    Hematocrit 44.2 42.0 - 52.0 %    .1 (H) 81.4 - 97.8 fL    MCH 32.1 27.0 - 33.0 pg    MCHC 31.4 (L) 33.7 - 35.3 g/dL    RDW 51.8 (H) 35.9 - 50.0 fL    Platelet Count 247 164 - 446 K/uL    MPV 10.6 9.0 - 12.9 fL    Neutrophils-Polys 47.20 44.00 - 72.00 %    Lymphocytes 40.20 22.00 - 41.00 %    Monocytes 10.80 0.00 - 13.40 %    Eosinophils 1.20 0.00 - 6.90 %    Basophils 0.50 0.00 - 1.80 %    Immature Granulocytes 0.10 0.00 - 0.90 %    Nucleated RBC 0.00 /100 WBC    Neutrophils (Absolute) 3.57 1.82 - 7.42 K/uL    Lymphs (Absolute) 3.05 1.00 - 4.80 K/uL    Monos (Absolute) 0.82 0.00 - 0.85 K/uL    Eos (Absolute) 0.09 0.00 - 0.51 K/uL    Baso (Absolute) 0.04 0.00 - 0.12 K/uL    Immature Granulocytes (abs) 0.01 0.00 - 0.11 K/uL    NRBC (Absolute) 0.00 K/uL   COMP METABOLIC PANEL    Collection Time: 06/14/20  9:58 AM   Result Value Ref Range    Sodium 136 135 - 145 mmol/L    Potassium 3.9 3.6 - 5.5 mmol/L    Chloride 104 96 - 112 mmol/L    Co2 23 20 - 33 mmol/L    Anion Gap 9.0 7.0 - 16.0    Glucose 103 (H) 65 - 99 mg/dL    Bun 25 (H) 8 - 22 mg/dL    Creatinine 0.96 0.50 - 1.40 mg/dL    Calcium 9.4 8.5 - 10.5 mg/dL    AST(SGOT) 20 12 - 45 U/L    ALT(SGPT) 15 2 - 50 U/L    Alkaline Phosphatase 101 (H) 30 - 99 U/L    Total Bilirubin 1.7 (H) 0.1 - 1.5 mg/dL    Albumin 3.9 3.2 - 4.9 g/dL    Total Protein 7.0 6.0 - 8.2 g/dL    Globulin 3.1 1.9 - 3.5 g/dL    A-G Ratio 1.3 g/dL   PROTHROMBIN TIME    Collection Time: 06/14/20  9:58 AM   Result Value Ref Range    PT 13.7 12.0 - 14.6 sec    INR 1.03 0.87 - 1.13   APTT    Collection Time: 06/14/20  9:58 AM   Result Value  Ref Range    APTT 27.6 24.7 - 36.0 sec   COD (ADULT)    Collection Time: 06/14/20  9:58 AM   Result Value Ref Range    ABO Grouping Only A     Rh Grouping Only NEG     Antibody Screen-Cod NEG    TROPONIN    Collection Time: 06/14/20  9:58 AM   Result Value Ref Range    Troponin T 12 6 - 19 ng/L   ESTIMATED GFR    Collection Time: 06/14/20  9:58 AM   Result Value Ref Range    GFR If African American >60 >60 mL/min/1.73 m 2    GFR If Non African American >60 >60 mL/min/1.73 m 2       Labs reviewed by me.       Imaging reviewed by me:     MR-BRAIN-W/O   Final Result      1.  Small sized acute infarct in the left posterior parasagittal frontal cortex. No evidence of intracranial hemorrhage or mass lesion.   2.  Small chronic infarcts in the right centrum semiovale and right occipital lobe.   3.  Moderate diffuse cerebral substance loss.   4.  Moderate microangiopathic ischemic change versus demyelination or gliosis.      DX-CHEST-PORTABLE (1 VIEW)   Final Result      No acute cardiopulmonary process is seen.      Atherosclerotic plaque.         CT-CTA NECK WITH & W/O-POST PROCESSING   Final Result      Atherosclerotic plaque in the common carotid arteries, carotid bulbs and proximal internal carotid arteries with less than 50% stenosis.      Mild plaque at the origins of the vertebral arteries bilaterally.      Multiple borderline cervical lymph nodes bilaterally are nonspecific.      Dilated ascending aorta measuring 4 cm.         CT-CTA HEAD WITH & W/O-POST PROCESS   Final Result      No thrombosis is seen within the [place].         CT-HEAD W/O   Final Result      Small area of right occipital hypodensity may represent a subacute to chronic infarct.      There are periventricular and subcortical white matter changes present.  This finding is nonspecific and could be from indeterminate age small vessel ischemia, demyelination, or gliosis.      Focal hypodensities in the basal ganglia and left thalamus are  likely related to prior lacunar infarcts..      EC-ECHOCARDIOGRAM COMPLETE W/O CONT    (Results Pending)         Presumed mechanism by TOAST:  __Large Artery Atherosclerosis  __Small Vessel (Lacunar)  __Cardioembolic  __Other (Sickle Cell, Vasculitis, Hypercoagulable)  _X_Unknown    Small vessel vs embolic/cardioembolic?      ASSESSMENT AND PLAN:  79-year old male with PMHX significant only for dyslipidemia, ?SLE (not currently on medication/DMARDs) who presented to [facility] on 6/14/20 for a chief complaint of dizziness, slurred speech and double vision, transient/resolved at time of presentation; NIHSS 0 on arrival thus patient determined not to be a candidate for IV tPA. Suspect TIA vs small acute ischemic stroke; note patient hypertensive, with irregular heart rhythm (not Afib, ?heart block, awaiting EKG, TTE) and ectopy on arrival, thus cardioembolic phenomena may also be considered.     Recommendations/Plan:     -q4h and PRN neuro assessment. VS per nursing/unit protocol. Permissive HTN ok until 6/16, not to exceed SBP > 220, DBP > 105. Then, BP goal < 140/90. Antihypertensives per primary team.   -Obtain MRI Brain wo contrast.     -Telemetry; currently SR with ectopy. Screen for Afib/arrhythmia. Obtain TTE with bubble study.   -ASA 81 mg PO q day and Atorvastatin 80 mg PO q HS. Check lipid panel.   -Recommend aggressive BG management per primary team. Avoid IVF with Dextrose. BG goal 140-180. Check hemoglobin A1c.   -PT/OT/SLP eval and treat.   -Counseled patient at length regarding life style and risk factor modification for secondary stroke prevention.   -All other medical management per primary team.   -DVT PPX: SCDs.      The plan of care above has been discussed with Dr. Lewis.     Claire Delgado, A.P.R.N.  [facility]      **Addendum, 6/14/20 3029: Note MRI Brain has revealed small acute ischemic infarct involving Left posterior frontal lobe; no associated  hemorrhage/hemorrhagic conversion. Given cortical nature of lesion as well as arrhythmia (did NOT appear to be in Afib per my review of telemetry) on arrival, recommend outpatient cardiac monitoring after discharge to formally rule out Afib if not discovered during current hospitalization. Permissive HTN OK until 6/16. Neurology will follow.

## 2020-06-14 NOTE — ASSESSMENT & PLAN NOTE
He found to have dilated ascending aorta measuring 4 cm on CTA.  He would need outpatient close follow-up.

## 2020-06-14 NOTE — PROGRESS NOTES
80yo with hx of dyslipidemia presented with dysarthria started this am around 8:45. Per ERP sx resolved     Dr Reed will evaluate pt for admit

## 2020-06-14 NOTE — CARE PLAN
Problem: Communication  Goal: The ability to communicate needs accurately and effectively will improve  Outcome: PROGRESSING AS EXPECTED     Problem: Safety  Goal: Will remain free from injury  Outcome: PROGRESSING AS EXPECTED  Goal: Will remain free from falls  Outcome: PROGRESSING AS EXPECTED     Problem: Pain Management  Goal: Pain level will decrease to patient's comfort goal  Outcome: PROGRESSING AS EXPECTED     Problem: Infection  Goal: Will remain free from infection  Outcome: PROGRESSING AS EXPECTED     Problem: Venous Thromboembolism (VTW)/Deep Vein Thrombosis (DVT) Prevention:  Goal: Patient will participate in Venous Thrombosis (VTE)/Deep Vein Thrombosis (DVT)Prevention Measures  Outcome: PROGRESSING AS EXPECTED     Problem: Psychosocial Needs:  Goal: Level of anxiety will decrease  Outcome: PROGRESSING AS EXPECTED

## 2020-06-14 NOTE — H&P
"Hospital Medicine History & Physical Note    Date of Service  6/14/2020    Primary Care Physician  Azul Cash P.A.-C.    Code Status    Full code (I discussed this on admission)    Chief Complaint  Chief Complaint   Patient presents with   • Lightheadedness   • Dizziness   • Aphasia       History of Presenting Illness    79 y.o. male with past medical history of dyslipidemia who presented to hospital on 6/14/2020 with complaint concern for stroke.  He reported this morning he was watering in his lawn around 9 AM and he noticed dizziness, slurred speech and he was unable to say words.  He also noticed left arm slight weakness.  He denies any history of prior similar complaints.  When he arrived to the hospital his symptoms has resolved.  He does not have history of hypertension and diabetes.  He does not smoke and he quit long time ago.  He denies using any drugs.  Denies drinking alcohol in excessive amount.  He does not loses consciousness during this episode.  He denies any symptoms of nausea, vomiting, diarrhea, shortness of breath, chest pain or any other acute complaints. He reported that he feels that he is back to his baseline    ER course: He underwent imaging studies and his CT head without contrast showed \"Small area of right occipital hypodensity may represent a subacute to chronic infarct\" CTA head and neck did not show any acute abnormalities.  He found to have ascending aorta of 4 cm.    Review of Systems  Review of Systems   Constitutional: Negative for chills, fever and weight loss.   HENT: Negative for hearing loss and tinnitus.    Eyes: Negative for blurred vision, double vision, photophobia and pain.   Respiratory: Negative for cough, sputum production and shortness of breath.    Cardiovascular: Negative for chest pain, palpitations, orthopnea and leg swelling.   Gastrointestinal: Negative for abdominal pain, constipation, diarrhea, nausea and vomiting.   Genitourinary: Negative for " dysuria, frequency and urgency.   Musculoskeletal: Negative for back pain, joint pain, myalgias and neck pain.   Skin: Negative for rash.   Neurological: Positive for speech change (Resolved at the time of evaluation.) and focal weakness (Resolved the time of evaluation). Negative for dizziness, tingling, tremors, sensory change and headaches.   Psychiatric/Behavioral: Negative for hallucinations and substance abuse.   All other systems reviewed and are negative.      Past Medical History   has a past medical history of Cataract (01/22/2020), Dental disorder (01/22/2020), High cholesterol (01/22/2020), and Lupus (systemic lupus erythematosus) (HCC).    Surgical History   has a past surgical history that includes other surgical procedure (Right, 1972); cast application; cataract phaco with iol (Right, 1/28/2020); cataract phaco with iol (Left, 2/11/2020); and open reduction.     Family History  family history includes Alcohol/Drug in his brother; Cancer in his brother and mother; Diabetes in his mother; Heart Disease (age of onset: 55) in his father; Hypertension in his father; No Known Problems in his brother and brother; Stroke (age of onset: 92) in his mother.     Social History   reports that he quit smoking about 58 years ago. His smoking use included cigarettes. He has a 4.00 pack-year smoking history. He has never used smokeless tobacco. He reports current alcohol use of about 0.6 oz of alcohol per week. He reports that he does not use drugs.    Allergies  No Known Allergies    Medications  Prior to Admission Medications   Prescriptions Last Dose Informant Patient Reported? Taking?   ascorbic acid (ASCORBIC ACID) 500 MG Tab 6/13/2020 at AM Patient Yes Yes   Sig: Take 500 mg by mouth every morning.   multivitamin (THERAGRAN) Tab 6/13/2020 at AM Patient Yes No   Sig: Take 1 Tab by mouth every morning.   pravastatin (PRAVACHOL) 40 MG tablet 6/13/2020 at PM Patient No No   Sig: TAKE 1 TABLET BY MOUTH ONCE DAILY AT  6PM      Facility-Administered Medications: None       Physical Exam  Temp:  [36.1 °C (96.9 °F)-36.2 °C (97.1 °F)] 36.2 °C (97.1 °F)  Pulse:  [61-78] 61  Resp:  [16-20] 20  BP: (151-179)/(85-91) 179/85  SpO2:  [95 %] 95 %    Physical Exam  Vitals signs reviewed.   Constitutional:       General: He is not in acute distress.  HENT:      Head: Normocephalic and atraumatic. No contusion.      Right Ear: External ear normal.      Left Ear: External ear normal.      Nose: Nose normal.      Mouth/Throat:      Mouth: Mucous membranes are dry.      Pharynx: No oropharyngeal exudate.   Eyes:      General:         Right eye: No discharge.         Left eye: No discharge.      Pupils: Pupils are equal, round, and reactive to light.   Neck:      Musculoskeletal: No neck rigidity or muscular tenderness.   Cardiovascular:      Rate and Rhythm: Normal rate and regular rhythm.      Heart sounds: No murmur. No friction rub. No gallop.    Pulmonary:      Effort: Pulmonary effort is normal.      Breath sounds: No wheezing or rhonchi.   Abdominal:      General: Bowel sounds are normal. There is no distension.      Palpations: Abdomen is soft.      Tenderness: There is no abdominal tenderness. There is no rebound.   Musculoskeletal: Normal range of motion.         General: No swelling or tenderness.   Skin:     General: Skin is warm and dry.      Coloration: Skin is not jaundiced.   Neurological:      General: No focal deficit present.      Mental Status: He is alert and oriented to person, place, and time. Mental status is at baseline.      Cranial Nerves: No cranial nerve deficit.      Sensory: No sensory deficit.      Motor: No weakness.      Coordination: Coordination normal.      Comments: Order 5/5 in all 4 extremities.  Sensation intact in all extremities   Psychiatric:         Mood and Affect: Mood normal.         Laboratory:  Recent Labs     06/14/20  0958   WBC 7.6   RBC 4.33*   HEMOGLOBIN 13.9*   HEMATOCRIT 44.2   .1*    MCH 32.1   MCHC 31.4*   RDW 51.8*   PLATELETCT 247   MPV 10.6     Recent Labs     06/14/20  0958   SODIUM 136   POTASSIUM 3.9   CHLORIDE 104   CO2 23   GLUCOSE 103*   BUN 25*   CREATININE 0.96   CALCIUM 9.4     Recent Labs     06/14/20  0958   ALTSGPT 15   ASTSGOT 20   ALKPHOSPHAT 101*   TBILIRUBIN 1.7*   GLUCOSE 103*     Recent Labs     06/14/20  0958   APTT 27.6   INR 1.03     No results for input(s): NTPROBNP in the last 72 hours.      Recent Labs     06/14/20  0958   TROPONINT 12       Imaging:  DX-CHEST-PORTABLE (1 VIEW)   Final Result      No acute cardiopulmonary process is seen.      Atherosclerotic plaque.         CT-CTA NECK WITH & W/O-POST PROCESSING   Final Result      Atherosclerotic plaque in the common carotid arteries, carotid bulbs and proximal internal carotid arteries with less than 50% stenosis.      Mild plaque at the origins of the vertebral arteries bilaterally.      Multiple borderline cervical lymph nodes bilaterally are nonspecific.      Dilated ascending aorta measuring 4 cm.         CT-CTA HEAD WITH & W/O-POST PROCESS   Final Result      No thrombosis is seen within the [place].         CT-HEAD W/O   Final Result      Small area of right occipital hypodensity may represent a subacute to chronic infarct.      There are periventricular and subcortical white matter changes present.  This finding is nonspecific and could be from indeterminate age small vessel ischemia, demyelination, or gliosis.      Focal hypodensities in the basal ganglia and left thalamus are likely related to prior lacunar infarcts..            Assessment/Plan:      TIA (transient ischemic attack)  Assessment & Plan  He presented with symptoms of slurred speech, left arm weakness and at the time of evaluation his symptoms resolved.  Neurology consulted by ER physician.  He underwent CT head without contrast and it showed a small area of right occipital hypodensity may represent a subacute to chronic  infarct.  Evaluated for that I ordered MRI brain without contrast.  I ordered echocardiogram.  Admit to telemetry for close observation.  Every 4 hours neuro check per  Permissive hypertension  I discussed plan of care with him and his daughter at the bedside.    Ascending aorta dilatation (HCC)  Assessment & Plan  He found to have dilated ascending aorta measuring 4 cm on CTA.  He would need outpatient close follow-up.    Hyperlipidemia- (present on admission)  Assessment & Plan  Started him on high intensity statin.  Atorvastatin 80 mg daily.  Ordered lipid profile for tomorrow morning.

## 2020-06-14 NOTE — ASSESSMENT & PLAN NOTE
He presented with symptoms of slurred speech, left arm weakness and at the time of evaluation his symptoms resolved.  Neurology consulted by ER physician.  He underwent CT head without contrast and it showed a small area of right occipital hypodensity may represent a subacute to chronic infarct.  MRI brain without contrast showed a small sized acute infarct in the left posterior parasagittal frontal cortex.  No evidence of intracranial hemorrhage or mass lesion.  I ordered echocardiogram and it showed ejection fraction of 60%  Permissive hypertension for 48 hours.  If he continued to have hypotension he may need antihypertensive medication.  Outpatient cardiac monitoring.

## 2020-06-14 NOTE — ED TRIAGE NOTES
Chief Complaint   Patient presents with   • Lightheadedness   • Dizziness   • Aphasia     Pt states that at 0847 this morning he was watering his garden when he became lightheaded, dizzy, and began having slurred speech and expresive aphasia.  Pt states symptoms resolved prior to arrival.  Pt states that he woke this morning with no symptoms.  Denies taking blood thinners.  Pt currently NIH of 0 in triage.  Stroke called.  Pt to [facility].  Report to Dr Wick and Sridhar ORDOÑEZ.

## 2020-06-15 DIAGNOSIS — I63.9 ACUTE ISCHEMIC STROKE (HCC): ICD-10-CM

## 2020-06-15 LAB
ABO + RH BLD: NORMAL
ALBUMIN SERPL BCP-MCNC: 3.4 G/DL (ref 3.2–4.9)
ALBUMIN/GLOB SERPL: 1.3 G/DL
ALP SERPL-CCNC: 86 U/L (ref 30–99)
ALT SERPL-CCNC: 12 U/L (ref 2–50)
ANION GAP SERPL CALC-SCNC: 9 MMOL/L (ref 7–16)
AST SERPL-CCNC: 18 U/L (ref 12–45)
BASOPHILS # BLD AUTO: 0.4 % (ref 0–1.8)
BASOPHILS # BLD: 0.03 K/UL (ref 0–0.12)
BILIRUB SERPL-MCNC: 1.4 MG/DL (ref 0.1–1.5)
BUN SERPL-MCNC: 25 MG/DL (ref 8–22)
CALCIUM SERPL-MCNC: 8.9 MG/DL (ref 8.5–10.5)
CHLORIDE SERPL-SCNC: 107 MMOL/L (ref 96–112)
CHOLEST SERPL-MCNC: 151 MG/DL (ref 100–199)
CO2 SERPL-SCNC: 22 MMOL/L (ref 20–33)
CREAT SERPL-MCNC: 0.97 MG/DL (ref 0.5–1.4)
EKG IMPRESSION: NORMAL
EKG IMPRESSION: NORMAL
EOSINOPHIL # BLD AUTO: 0.14 K/UL (ref 0–0.51)
EOSINOPHIL NFR BLD: 2 % (ref 0–6.9)
ERYTHROCYTE [DISTWIDTH] IN BLOOD BY AUTOMATED COUNT: 49.5 FL (ref 35.9–50)
EST. AVERAGE GLUCOSE BLD GHB EST-MCNC: 100 MG/DL
GLOBULIN SER CALC-MCNC: 2.6 G/DL (ref 1.9–3.5)
GLUCOSE SERPL-MCNC: 111 MG/DL (ref 65–99)
HBA1C MFR BLD: 5.1 % (ref 0–5.6)
HCT VFR BLD AUTO: 40.1 % (ref 42–52)
HDLC SERPL-MCNC: 32 MG/DL
HGB BLD-MCNC: 13.4 G/DL (ref 14–18)
IMM GRANULOCYTES # BLD AUTO: 0.02 K/UL (ref 0–0.11)
IMM GRANULOCYTES NFR BLD AUTO: 0.3 % (ref 0–0.9)
LDLC SERPL CALC-MCNC: 86 MG/DL
LYMPHOCYTES # BLD AUTO: 2.3 K/UL (ref 1–4.8)
LYMPHOCYTES NFR BLD: 33.6 % (ref 22–41)
MCH RBC QN AUTO: 32.8 PG (ref 27–33)
MCHC RBC AUTO-ENTMCNC: 33.4 G/DL (ref 33.7–35.3)
MCV RBC AUTO: 98.3 FL (ref 81.4–97.8)
MONOCYTES # BLD AUTO: 0.82 K/UL (ref 0–0.85)
MONOCYTES NFR BLD AUTO: 12 % (ref 0–13.4)
NEUTROPHILS # BLD AUTO: 3.53 K/UL (ref 1.82–7.42)
NEUTROPHILS NFR BLD: 51.7 % (ref 44–72)
NRBC # BLD AUTO: 0 K/UL
NRBC BLD-RTO: 0 /100 WBC
PLATELET # BLD AUTO: 222 K/UL (ref 164–446)
PMV BLD AUTO: 10.4 FL (ref 9–12.9)
POTASSIUM SERPL-SCNC: 3.8 MMOL/L (ref 3.6–5.5)
PROT SERPL-MCNC: 6 G/DL (ref 6–8.2)
RBC # BLD AUTO: 4.08 M/UL (ref 4.7–6.1)
SODIUM SERPL-SCNC: 138 MMOL/L (ref 135–145)
TRIGL SERPL-MCNC: 163 MG/DL (ref 0–149)
WBC # BLD AUTO: 6.8 K/UL (ref 4.8–10.8)

## 2020-06-15 PROCEDURE — 93010 ELECTROCARDIOGRAM REPORT: CPT | Performed by: INTERNAL MEDICINE

## 2020-06-15 PROCEDURE — 83036 HEMOGLOBIN GLYCOSYLATED A1C: CPT

## 2020-06-15 PROCEDURE — A9270 NON-COVERED ITEM OR SERVICE: HCPCS | Performed by: PSYCHIATRY & NEUROLOGY

## 2020-06-15 PROCEDURE — 99225 PR SUBSEQUENT OBSERVATION CARE,LEVEL II: CPT | Performed by: INTERNAL MEDICINE

## 2020-06-15 PROCEDURE — 85025 COMPLETE CBC W/AUTO DIFF WBC: CPT

## 2020-06-15 PROCEDURE — 80061 LIPID PANEL: CPT

## 2020-06-15 PROCEDURE — 96105 ASSESSMENT OF APHASIA: CPT

## 2020-06-15 PROCEDURE — 80053 COMPREHEN METABOLIC PANEL: CPT

## 2020-06-15 PROCEDURE — 700102 HCHG RX REV CODE 250 W/ 637 OVERRIDE(OP): Performed by: INTERNAL MEDICINE

## 2020-06-15 PROCEDURE — 97161 PT EVAL LOW COMPLEX 20 MIN: CPT

## 2020-06-15 PROCEDURE — 97165 OT EVAL LOW COMPLEX 30 MIN: CPT

## 2020-06-15 PROCEDURE — 93005 ELECTROCARDIOGRAM TRACING: CPT | Performed by: INTERNAL MEDICINE

## 2020-06-15 PROCEDURE — 700102 HCHG RX REV CODE 250 W/ 637 OVERRIDE(OP): Performed by: PSYCHIATRY & NEUROLOGY

## 2020-06-15 PROCEDURE — A9270 NON-COVERED ITEM OR SERVICE: HCPCS | Performed by: INTERNAL MEDICINE

## 2020-06-15 PROCEDURE — G0378 HOSPITAL OBSERVATION PER HR: HCPCS

## 2020-06-15 PROCEDURE — 99214 OFFICE O/P EST MOD 30 MIN: CPT | Performed by: NURSE PRACTITIONER

## 2020-06-15 RX ORDER — ONDANSETRON 4 MG/1
4 TABLET, ORALLY DISINTEGRATING ORAL ONCE
Status: DISCONTINUED | OUTPATIENT
Start: 2020-06-15 | End: 2020-06-15 | Stop reason: CLARIF

## 2020-06-15 RX ORDER — CLOPIDOGREL BISULFATE 75 MG/1
75 TABLET ORAL DAILY
Status: DISCONTINUED | OUTPATIENT
Start: 2020-06-15 | End: 2020-06-16 | Stop reason: HOSPADM

## 2020-06-15 RX ADMIN — ASPIRIN 325 MG: 325 TABLET, FILM COATED ORAL at 05:07

## 2020-06-15 RX ADMIN — ATORVASTATIN CALCIUM 80 MG: 80 TABLET, FILM COATED ORAL at 17:17

## 2020-06-15 RX ADMIN — CLOPIDOGREL BISULFATE 75 MG: 75 TABLET ORAL at 17:17

## 2020-06-15 ASSESSMENT — ENCOUNTER SYMPTOMS
FOCAL WEAKNESS: 0
EYE PAIN: 0
ABDOMINAL PAIN: 0
COUGH: 0
TINGLING: 0
NECK PAIN: 0
CHILLS: 0
DOUBLE VISION: 0
DIZZINESS: 0
HALLUCINATIONS: 0
PHOTOPHOBIA: 0
CONSTIPATION: 0
WEIGHT LOSS: 0
PALPITATIONS: 0
BLURRED VISION: 0
SPUTUM PRODUCTION: 0
SPEECH CHANGE: 0
ORTHOPNEA: 0
VOMITING: 0
SENSORY CHANGE: 0
FEVER: 0
MYALGIAS: 0
HEADACHES: 0
DIARRHEA: 0
TREMORS: 0
NAUSEA: 0
BACK PAIN: 0
SHORTNESS OF BREATH: 0

## 2020-06-15 ASSESSMENT — COGNITIVE AND FUNCTIONAL STATUS - GENERAL
DAILY ACTIVITIY SCORE: 24
MOBILITY SCORE: 24
SUGGESTED CMS G CODE MODIFIER DAILY ACTIVITY: CH
SUGGESTED CMS G CODE MODIFIER MOBILITY: CH

## 2020-06-15 ASSESSMENT — PATIENT HEALTH QUESTIONNAIRE - PHQ9
2. FEELING DOWN, DEPRESSED, IRRITABLE, OR HOPELESS: NOT AT ALL
SUM OF ALL RESPONSES TO PHQ9 QUESTIONS 1 AND 2: 0
1. LITTLE INTEREST OR PLEASURE IN DOING THINGS: NOT AT ALL

## 2020-06-15 ASSESSMENT — ACTIVITIES OF DAILY LIVING (ADL): TOILETING: INDEPENDENT

## 2020-06-15 ASSESSMENT — GAIT ASSESSMENTS
DISTANCE (FEET): 500
GAIT LEVEL OF ASSIST: SUPERVISED
DEVIATION: NO DEVIATION

## 2020-06-15 ASSESSMENT — LIFESTYLE VARIABLES: SUBSTANCE_ABUSE: 0

## 2020-06-15 NOTE — CARE PLAN
Problem: Communication  Goal: The ability to communicate needs accurately and effectively will improve  6/15/2020 1121 by Helen Colorado R.N.  Outcome: PROGRESSING AS EXPECTED  6/15/2020 1114 by Helen Colorado R.N.  Outcome: PROGRESSING AS EXPECTED     Problem: Safety  Goal: Will remain free from injury  6/15/2020 1121 by Helen Colorado R.N.  Outcome: PROGRESSING AS EXPECTED  6/15/2020 1114 by Helen Colorado R.N.  Outcome: PROGRESSING AS EXPECTED

## 2020-06-15 NOTE — DISCHARGE PLANNING
[facility]     Referral from:   Dr. Reed  Facesheet indicates:  [employer]  Potential Rehab Diagnosis:  CVA    Chart review indicates patient has on going medical management, may have therapy needs to possibly meet inpatient rehab facility criteria with the goal of returning to community.    D/C support:  To be determined     Physiatry consultation pended per protocol while waiting for additional information.   Would welcome PT/OT evals.  TCC following.        Thank you for the referral.

## 2020-06-15 NOTE — PROGRESS NOTES
Average heart rate - 69. TX - .16, QRS - .10, QT - .44. Sinus rhythm with PAC's and PVC's. EKG done earlier.

## 2020-06-15 NOTE — THERAPY
Physical Therapy   Initial Evaluation     Patient Name: Erasto Calhoun Jr.  Age:  79 y.o., Sex:  male  Medical Record #: [id number]  Today's Date: 6/15/2020     Assessment  Patient is 79 y.o. male admitted with stroke-like symptoms, MRI showing small acute ischemic infarct to L posterior frontal lobe. Symptoms have resolved and pt reports feels back to baseline. Pt performed all mobility without assist, ambulated without AD, no LOB. Was able to complete higher level balance tasks while ambulating without difficulty. Pt reports no concerns with return home. Patient will not be actively followed for physical therapy services at this time, however may be seen if requested by physician for 1 more visit within 30 days to address any discharge or equipment needs.     Plan  Recommend Physical Therapy for Evaluation only   Discharge recommendations: Anticipate that the patient will have no further physical therapy needs after discharge from the hospital.       06/15/20 1034   Prior Living Situation   Prior Services None   Housing / Facility 1 Story House   Steps Into Home 0   Steps In Home 0   Lives with - Patient's Self Care Capacity Alone and Able to Care For Self   Comments dtr lives close by and can assist   Prior Level of Functional Mobility   Bed Mobility Independent   Transfer Status Independent   Ambulation Independent   Distance Ambulation (Feet) community distances   Assistive Devices Used None   Stairs Independent   Gait Analysis   Gait Level Of Assist Supervised   Assistive Device None   Distance (Feet) 500   Deviation No deviation   Weight Bearing Status no restrictions   Comments able to turn head in all directions and change speed and direction without LOB   Bed Mobility    Supine to Sit Supervised   Scooting Supervised   Functional Mobility   Sit to Stand Supervised   Bed, Chair, Wheelchair Transfer Supervised   Anticipated Discharge Equipment   DC Equipment None

## 2020-06-15 NOTE — PROGRESS NOTES
Patient resting quietly in bed with daughter at side. No distress noted or c/o voiced. No signs and or symptoms of any deficits related to stroke. Eating well. Bedtime snack given.

## 2020-06-15 NOTE — THERAPY
Speech Language Pathology   Initial Aphasia Assessment     Patient Name: Erasto Calhoun Jr.  AGE:  79 y.o., SEX:  male  Medical Record #: [id number]  Today's Date: 6/15/2020          Assessment    Patient is 79 y.o. male with a diagnosis of small CVA in L posterior parasagittal frontal cortex.  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  PMHx includes Dyslipidemia.  Pt reports symptoms of dizziness, slurred speech, expressive aphasia and L arm weakness, which resolved upon arrival to hospital.     RN reports pt does not appear to have any further s/sx of expressive aphasia.  Upon entering the room, pt was AAOx4, engaging in complex conversation with this clinician.  Pt's voice was clear and strong, and no dysarthria or word finding deficits were appreciated in conversation.  The Western Aphasia Battery (Bedside edition) was administered in full.  Pt was within normal limits for all domains, and received a score of 100, which indicates no aphasia (WNL).  Pt was educated regarding s/sx to be aware of post discharge and verbalized very good understanding.  No further SLP needed in acute care setting at this time.  Thank you for the consult.    Plan    Recommend Speech Therapy for Evaluation only.  Discharge recommendations:  Anticipate that the patient will have no further speech therapy needs after discharge from the hospital.       Objective     06/15/20 0801   Prior Living Situation   Prior Services Home-Independent   Housing / Facility [address]   Lives with - Patient's Self Care Capacity Alone and Able to Care For Self   Prior Level Of Function   Communication Within Functional Limits   Swallow Within Functional Limits   Hearing Within Functional Limits for Evaluation   Hearing Aid None   Vision Wears Corrective Lenses   Patient's Primary Language English   Occupation (Pre-Hospital Vocational) Retired Due To Age  ( and  )   Verbal  Expression   Verbal Expression / Aphasia Eval (WDL) WDL   Vocal Quality Clear   Verbal Output Automatic Within Functional Limits (6-7)   Verbal Output Conversation Within Functional Limits (6-7)   Verbal Output Functional Within Functional Limits (6-7)   Repetition: Phrases Within Functional Limits (6-7)   Repetition: Sentences Within Functional Limits (6-7)   Naming Within Functional Limits (6-7)   Dysarthria Within Functional Limits (6-7)   Word Finding Deficits Within Functional Limits (6-7)   Auditory Comprehension   Auditory Comprehension (WDL) WDL   Yes / No Questions: Personal Information Within Functional Limits (6-7)   Yes / No Questions: General Information Within Functional Limits (6-7)   Yes / No Questions: Abstract Within Functional Limits (6-7)   Identifies Objects Within Functional Limits (6-7)   Identifies Pictures Within Functional Limits (6-7)   Identifies Body Parts Within Functional Limits (6-7)   Follows Two Unit Commands Within Functional Limits (6-7)   Follows Three Unit Commands Within Functional Limits (6-7)   Understands Simple, Structured Conversation  Within Functional Limits (6-7)   Understands Complex Conversation Within Functional Limits (6-7)   Reading Comprehension   Reading Comprehension (WDL) WDL   Reading Short Paragraphs  Within Functional Limits (6-7)   Written Expression   Written Expression (WDL) WDL   Functional Writing: Name Within Functional Limits (6-7)   Functional Writing to Dictation: Sentence Within Functional Limits (6-7)   Cognitive-Linguistic   Level of Consciousness Alert   Orientation Level Oriented x 4   Social / Pragmatic Communication   Social / Pragmatic Communication WDL   Aphasia Compensatory Strategies   Compensatory Strategies Used To Communicate Not Tested  (not needed)   Outcome Measures   Outcome Measures Utilized WAB-Bedside   WAB - Bedside (Western Aphasia Battery)   Spontaneous Speech: Content  10   Spontaneous Speech: Fluency 10   Auditory  Comprehension 10   Sequential Commands 10   Repetition Score  10   Object Naming 10   Reading 10   Writing 10   Apraxia  10   Bedside Asphasia Score 100   Bedside Language Score 100

## 2020-06-16 VITALS
WEIGHT: 164.9 LBS | HEART RATE: 81 BPM | SYSTOLIC BLOOD PRESSURE: 139 MMHG | OXYGEN SATURATION: 92 % | BODY MASS INDEX: 22.34 KG/M2 | RESPIRATION RATE: 20 BRPM | DIASTOLIC BLOOD PRESSURE: 85 MMHG | HEIGHT: 72 IN | TEMPERATURE: 97.3 F

## 2020-06-16 PROCEDURE — A9270 NON-COVERED ITEM OR SERVICE: HCPCS | Performed by: PSYCHIATRY & NEUROLOGY

## 2020-06-16 PROCEDURE — G0378 HOSPITAL OBSERVATION PER HR: HCPCS

## 2020-06-16 PROCEDURE — 99217 PR OBSERVATION CARE DISCHARGE: CPT | Performed by: INTERNAL MEDICINE

## 2020-06-16 PROCEDURE — 700102 HCHG RX REV CODE 250 W/ 637 OVERRIDE(OP): Performed by: PSYCHIATRY & NEUROLOGY

## 2020-06-16 RX ORDER — ASPIRIN 81 MG/1
81 TABLET ORAL DAILY
Qty: 30 TAB | Refills: 3 | Status: SHIPPED | OUTPATIENT
Start: 2020-06-17 | End: 2020-09-02 | Stop reason: SDUPTHER

## 2020-06-16 RX ORDER — CLOPIDOGREL BISULFATE 75 MG/1
75 TABLET ORAL DAILY
Qty: 30 TAB | Refills: 1 | Status: SHIPPED | OUTPATIENT
Start: 2020-06-17 | End: 2020-08-12

## 2020-06-16 RX ORDER — ATORVASTATIN CALCIUM 80 MG/1
80 TABLET, FILM COATED ORAL EVERY EVENING
Qty: 30 TAB | Refills: 1 | Status: SHIPPED | OUTPATIENT
Start: 2020-06-16 | End: 2020-06-17 | Stop reason: SDUPTHER

## 2020-06-16 RX ADMIN — ASPIRIN 81 MG: 81 TABLET, COATED ORAL at 04:38

## 2020-06-16 RX ADMIN — CLOPIDOGREL BISULFATE 75 MG: 75 TABLET ORAL at 04:37

## 2020-06-16 NOTE — PROGRESS NOTES
Neurology Progress Note  Neurohospitalist Service, [facility]    Referring Physician: Mary Reed MD    Chief Complaint   Patient presents with   • Lightheadedness   • Dizziness   • Aphasia       HPI: Refer to initial documented Neurology H&P, as detailed in the patient's chart.    Interval History 6/15/2020: Erasto Calhoun Jr remains admitted to CDU after presenting to [facility] ER yesterday with transient/resolved complaints of dizziness, slurred speech, double vision.  No acute events overnight. Currently patient is sitting up in chair at bedside, awake, alert, and oriented. He has no current medical complaints.    Past Medical History:   Past Medical History:   Diagnosis Date   • Cataract 01/22/2020    bilateral   • Dental disorder 01/22/2020    upper and lower dentures   • High cholesterol 01/22/2020    on med   • Lupus (systemic lupus erythematosus) (HCC)     Pt reports only symptom was rash, on plaquenil for several yrs, stopped 2004        FHx:  Family History   Problem Relation Age of Onset   • Cancer Mother         skin cancer   • Stroke Mother 92   • Diabetes Mother    • Hypertension Father    • Heart Disease Father 55        father passed from heart attack   • No Known Problems Brother    • No Known Problems Brother    • Cancer Brother         throat ca   • Alcohol/Drug Brother         heavy drinker        SHx:  Social History     Socioeconomic History   • Marital status:      Spouse name: Not on file   • Number of children: Not on file   • Years of education: Not on file   • Highest education level: Not on file   Occupational History   • Not on file   Social Needs   • Financial resource strain: Not on file   • Food insecurity     Worry: Not on file     Inability: Not on file   • Transportation needs     Medical: No     Non-medical: No   Tobacco Use   • Smoking status: Former Smoker     Packs/day: 1.00     Years: 4.00     Pack years: 4.00     Types: Cigarettes      Last attempt to quit: 1962     Years since quittin.4   • Smokeless tobacco: Never Used   • Tobacco comment: quit    Substance and Sexual Activity   • Alcohol use: Yes     Alcohol/week: 0.6 oz     Types: 1 Shots of liquor per week     Comment: 1 per week   • Drug use: No   • Sexual activity: Not Currently     Partners: Female   Lifestyle   • Physical activity     Days per week: Not on file     Minutes per session: Not on file   • Stress: Not on file   Relationships   • Social connections     Talks on phone: Not on file     Gets together: Not on file     Attends Cheondo[employer] service: Not on file     Active member of club or organization: Not on file     Attends meetings of clubs or organizations: Not on file     Relationship status: Not on file   • Intimate partner violence     Fear of current or ex partner: Not on file     Emotionally abused: Not on file     Physically abused: Not on file     Forced sexual activity: Not on file   Other Topics Concern   • Not on file   Social History Narrative   • Not on file        Medications:    Current Facility-Administered Medications:   •  clopidogrel (PLAVIX) tablet 75 mg, 75 mg, Oral, DAILY, Esdras Valdes M.D., 75 mg at 06/15/20 1717  •  [START ON 2020] aspirin EC (ECOTRIN) tablet 81 mg, 81 mg, Oral, DAILY, Esdras Valdes M.D.  •  senna-docusate (PERICOLACE or SENOKOT S) 8.6-50 MG per tablet 2 Tab, 2 Tab, Oral, BID **AND** polyethylene glycol/lytes (MIRALAX) PACKET 1 Packet, 1 Packet, Oral, QDAY PRN **AND** magnesium hydroxide (MILK OF MAGNESIA) suspension 30 mL, 30 mL, Oral, QDAY PRN **AND** bisacodyl (DULCOLAX) suppository 10 mg, 10 mg, Rectal, QDAY PRN, Mary Reed M.D.  •  acetaminophen (TYLENOL) tablet 650 mg, 650 mg, Oral, Q6HRS PRN, Mary Reed M.D.  •  Pharmacy consult request - Allow for permissive hypertension: SBP up to 220 mmHg/DBP up to 120 mmHg x 48 hours, , Other, PHARMACY TO DOSE, Mary Reed  M.D.  •  atorvastatin (LIPITOR) tablet 80 mg, 80 mg, Oral, Q EVENING, Mary Roman Reed M.D., 80 mg at 06/15/20 1717    Allergies:  No Known Allergies     Review of systems:   Constitutional: denies fever, night sweats, weight loss.   Eyes: denies acute vision change, eye pain or secretion.   Ears, Nose, Mouth, Throat: denies nasal secretion, nasal bleeding, difficulty swallowing, hearing loss, tinnitus, vertigo, ear pain, acute dental problems, oral ulcers or lesions.   Endocrine: denies recent weight changes, heat or cold intolerance, polyuria, polydypsia, polyphagia,abnormal hair growth.  Cardiovascular: denies new onset of chest pain, palpitations, syncope, or dyspnea of exertion.  Pulmonary: denies shortness of breath, new onset of cough, hemoptysis, wheezing, chest pain or flu-like symptoms.   GI: denies nausea, vomiting, diarrhea, GI bleeding, change in appetite, abdominal pain, and change in bowel habits.  : denies dysuria, urinary incontinence, hematuria.  Heme/oncology: denies history of easy bruising or bleeding. No history of cancer, DVTor PE.  Allergy/immunology: denies hives/urticaria, or itching.   Dermatologic: denies new rash, or new skin lesions.  Musculoskeletal:denies joint swelling or pain, muscle pain, neck and back pain.   Neurologic: See interval history; denies headaches, facial droopiness, muscle weakness (focal or generalized), paresthesias, anesthesia, ataxia, memory loss, abnormal movements, seizures, loss of consciousness, or episodes of confusion.   Psychiatric: denies symptoms of depression, anxiety, hallucinations, mood swings or changes, suicidal or homicidal thoughts.     Physical Examination:   Vitals:    06/15/20 0429 06/15/20 0842 06/15/20 1220 06/15/20 1707   BP: 155/76 (!) 174/109 153/88 144/87   Pulse: 89 75 83 96   Resp: 18 16 16 16   Temp: 36.2 °C (97.1 °F) 36.3 °C (97.4 °F) 36.1 °C (96.9 °F) 35.9 °C (96.6 °F)   TempSrc: Temporal Temporal Temporal Temporal   SpO2:  92% 95% 91%    Weight:       Height:         General: Patient in no acute distress, pleasant and cooperative.  HEENT: Normocephalic, no signs of acute trauma.   Neck: supple, no meningeal signs or carotid bruits. There is normal range of motion. No tenderness on exam.   Chest: clear to auscultation. No cough.   CV: RRR, no murmurs.   Skin: no signs of acute rashes or trauma.   Musculoskeletal: joints exhibit full range of motion, without any pain to palpation. There are no signs of joint or muscle swelling. There is no tenderness to deep palpation of muscles.   Psychiatric: No hallucinatory behavior. Denies symptoms of depression or suicidal ideation. Mood and affect appear normal on exam.     NEUROLOGICAL EXAM:   Mental status, orientation: Awake, alert and fully oriented.   Speech and language: speech is clear and fluent. The patient is able to name, repeat and comprehend.   Memory: There is intact recollection of recent and remote events.   Cranial nerve exam: Pupils are 3-4 mm bilaterally and equally reactive to light and accommodation. Visual fields are intact by confrontation. There is no nystagmus on primary or secondary gaze. Intact full EOM in all directions of gaze. Face appears symmetric. Sensation in the face is intact to light touch. Uvula is midline. Palate elevates symmetrically. Tongue is midline and without any signs of tongue biting or fasciculations. Sternocleidomastoid muscles exhibit is normal strength bilaterally. Shoulder shrug is intact bilaterally.   Motor exam: Strength is 5/5 in all extremities. Tone is normal. No abnormal movements were seen on exam.   Sensory exam reveals normal sense of light touch and pinprick in all extremities.   Deep tendon reflexes:  2+ throughout. Plantar responses are flexor. There is no clonus.   Coordination: shows a normal finger-nose-finger and heel-down-shin. Normal rapidly alternating movements.   Gait: Deferred as patient is fall risk.      Ancillary Data  Reviewed:    Labs:  Lab Results   Component Value Date/Time    PROTHROMBTM 13.7 06/14/2020 09:58 AM    INR 1.03 06/14/2020 09:58 AM      Lab Results   Component Value Date/Time    WBC 6.8 06/15/2020 05:15 AM    RBC 4.08 (L) 06/15/2020 05:15 AM    HEMOGLOBIN 13.4 (L) 06/15/2020 05:15 AM    HEMATOCRIT 40.1 (L) 06/15/2020 05:15 AM    MCV 98.3 (H) 06/15/2020 05:15 AM    MCH 32.8 06/15/2020 05:15 AM    MCHC 33.4 (L) 06/15/2020 05:15 AM    MPV 10.4 06/15/2020 05:15 AM    NEUTSPOLYS 51.70 06/15/2020 05:15 AM    LYMPHOCYTES 33.60 06/15/2020 05:15 AM    MONOCYTES 12.00 06/15/2020 05:15 AM    EOSINOPHILS 2.00 06/15/2020 05:15 AM    BASOPHILS 0.40 06/15/2020 05:15 AM      Lab Results   Component Value Date/Time    SODIUM 138 06/15/2020 05:15 AM    POTASSIUM 3.8 06/15/2020 05:15 AM    CHLORIDE 107 06/15/2020 05:15 AM    CO2 22 06/15/2020 05:15 AM    GLUCOSE 111 (H) 06/15/2020 05:15 AM    BUN 25 (H) 06/15/2020 05:15 AM    CREATININE 0.97 06/15/2020 05:15 AM      Lab Results   Component Value Date/Time    CHOLSTRLTOT 151 06/15/2020 05:15 AM    LDL 86 06/15/2020 05:15 AM    HDL 32 (A) 06/15/2020 05:15 AM    TRIGLYCERIDE 163 (H) 06/15/2020 05:15 AM       Lab Results   Component Value Date/Time    ALKPHOSPHAT 86 06/15/2020 05:15 AM    ASTSGOT 18 06/15/2020 05:15 AM    ALTSGPT 12 06/15/2020 05:15 AM    TBILIRUBIN 1.4 06/15/2020 05:15 AM        Imaging/Testing:    I interpreted and/or reviewed the patient's neuroimaging    EC-ECHOCARDIOGRAM COMPLETE W/O CONT   Final Result      MR-BRAIN-W/O   Final Result      1.  Small sized acute infarct in the left posterior parasagittal frontal cortex. No evidence of intracranial hemorrhage or mass lesion.   2.  Small chronic infarcts in the right centrum semiovale and right occipital lobe.   3.  Moderate diffuse cerebral substance loss.   4.  Moderate microangiopathic ischemic change versus demyelination or gliosis.      DX-CHEST-PORTABLE (1 VIEW)   Final Result      No acute cardiopulmonary  process is seen.      Atherosclerotic plaque.         CT-CTA NECK WITH & W/O-POST PROCESSING   Final Result      Atherosclerotic plaque in the common carotid arteries, carotid bulbs and proximal internal carotid arteries with less than 50% stenosis.      Mild plaque at the origins of the vertebral arteries bilaterally.      Multiple borderline cervical lymph nodes bilaterally are nonspecific.      Dilated ascending aorta measuring 4 cm.         CT-CTA HEAD WITH & W/O-POST PROCESS   Final Result      No thrombosis is seen within the [place].         CT-HEAD W/O   Final Result      Small area of right occipital hypodensity may represent a subacute to chronic infarct.      There are periventricular and subcortical white matter changes present.  This finding is nonspecific and could be from indeterminate age small vessel ischemia, demyelination, or gliosis.      Focal hypodensities in the basal ganglia and left thalamus are likely related to prior lacunar infarcts..          Assessment:    Erasto Calhoun Jr. is a 79 y.o. male with relevant history of dyslipidemia and SLE (not currently on medication/DMARDs) who presented to [facility] ER yesterday with transient/resolved complaints of dizziness, slurred speech, and double vision for whom neurology was consulted to address possible stroke.  NIHSS 0 on arrival thus patient was determined not to be a candidate for IV TPA.  NIHSS remains 0 today on exam.    Impression: Small acute ischemic stroke of the left posterior parasagittal frontal cortex in the setting of hypertension with irregular heart rhythm and ectopy on arrival with cardioembolic etiology probable.    Plan:  -q4h and PRN neuro assessment. VS per nursing/unit protocol. Permissive HTN ok until 6/16/20 not to exceed SBP > 220, DBP > 105. Then, BP goal < 140/90. Antihypertensives per primary team.   -MRI Brain wo contrast revealed small acute infarct of the left posterior parasagittal frontal cortex  with no evidence of intracranial hemorrhage or mass lesion, small chronic infarcts of the right centrum semiovale and right occipital lobe.   -Telemetry and pulse oximetry; currently SR. Screen for Afib/arrhythmia.  -TTE  revealed EF estimated 60%, no gross structural abnormalities, but no bubble study completed.   -Recommend outpatient extended cardiac event monitoring (eg. Zio patch vs. Loop) to rule out/rule in Afib as cardioembolic etiology of transient symptoms.  -Continue ASA 81 mg PO daily and add Clopidogrel 75mg PO daily for DAPT for 30 days, followed by ASA 81mg PO daily alone thereafter.  -Continue Atorvastatin 80 mg PO q HS.  Note lipid panel: LDL 86 (within goal of less than 100), HDL 32 (goal greater than 40).   -Recommend aggressive BG management per primary team. Avoid IVF with Dextrose. BG goal 140-180.  Pending hemoglobin A1c.   -PT/OT/SLP eval and treat.   -Counseled patient at length regarding life style and risk factor modification for secondary stroke prevention.   -Follow up outpatient at [facility]. Will place referral.   -All other medical management per primary team.   -DVT PPX: SCDs.      The evaluation of the patient, and recommended management, was discussed with Dr. Kely Valdes and bedside RN. I have performed a physical exam and reviewed and updated ROS and Plan today (6/15/2020). In review of yesterday's note (6/14/2020), there are no changes except as documented above.    No further recommendations or further studies from a neurological standpoint at this time. Please re-consult if you have further questions or there is a change in status.      [email or web address] [email or web address].   Nurse Practitioner, Neurohospitalist  [facility]  t) [phone] (f) [phone]

## 2020-06-16 NOTE — PROGRESS NOTES
Hospital Medicine Daily Progress Note    Date of Service  6/15/2020    Chief Complaint  79 y.o. male admitted 6/14/2020 with concern for stroke    Hospital Course    *7-year-old male with past medical history of dyslipidemia presented to the hospital on June 14, 2020 with sent for a stroke.  He underwent MRI brain and he found to have a stroke.*      Interval Problem Update    I evaluated and examined him at the bedside.  He denies any acute complaints.  I discussed with him regarding permissive hypertension as his blood pressure is still running high.  Discussed finding of MRI brain with him.  Explained him that he will need outpatient Holter/ziopatch monitor.  He found to have dilated ascending aorta of 4 cm      Consultants/Specialty  Neurology    Code Status  Full code    Disposition  To be decided    Review of Systems  Review of Systems   Constitutional: Negative for chills, fever and weight loss.   HENT: Negative for hearing loss and tinnitus.    Eyes: Negative for blurred vision, double vision, photophobia and pain.   Respiratory: Negative for cough, sputum production and shortness of breath.    Cardiovascular: Negative for chest pain, palpitations, orthopnea and leg swelling.   Gastrointestinal: Negative for abdominal pain, constipation, diarrhea, nausea and vomiting.   Genitourinary: Negative for dysuria, frequency and urgency.   Musculoskeletal: Negative for back pain, joint pain, myalgias and neck pain.   Skin: Negative for rash.   Neurological: Negative for dizziness, tingling, tremors, sensory change, speech change, focal weakness and headaches.   Psychiatric/Behavioral: Negative for hallucinations and substance abuse.   All other systems reviewed and are negative.       Physical Exam  Temp:  [35.9 °C (96.6 °F)-37.1 °C (98.8 °F)] 35.9 °C (96.6 °F)  Pulse:  [62-96] 96  Resp:  [16-20] 16  BP: (144-174)/() 144/87  SpO2:  [91 %-98 %] 91 %    Physical Exam  Vitals signs reviewed.   Constitutional:        General: He is not in acute distress.  HENT:      Head: Normocephalic and atraumatic. No contusion.      Right Ear: External ear normal.      Left Ear: External ear normal.      Nose: Nose normal.      Mouth/Throat:      Mouth: Mucous membranes are moist.      Pharynx: No oropharyngeal exudate.   Eyes:      General:         Right eye: No discharge.         Left eye: No discharge.      Pupils: Pupils are equal, round, and reactive to light.   Neck:      Musculoskeletal: No neck rigidity or muscular tenderness.   Cardiovascular:      Rate and Rhythm: Normal rate and regular rhythm.      Heart sounds: No murmur. No friction rub. No gallop.    Pulmonary:      Effort: Pulmonary effort is normal.      Breath sounds: No wheezing or rhonchi.   Abdominal:      General: Bowel sounds are normal. There is no distension.      Palpations: Abdomen is soft.      Tenderness: There is no abdominal tenderness. There is no rebound.   Musculoskeletal: Normal range of motion.         General: No swelling or tenderness.   Skin:     General: Skin is warm and dry.      Coloration: Skin is not jaundiced.   Neurological:      General: No focal deficit present.      Mental Status: He is alert and oriented to person, place, and time. Mental status is at baseline.      Cranial Nerves: No cranial nerve deficit.      Sensory: No sensory deficit.      Comments: Motor 5/5 in all 4 extremities  Sensation intact in all extremities.   Psychiatric:         Mood and Affect: Mood normal.         Fluids  No intake or output data in the 24 hours ending 06/15/20 1935    Laboratory  Recent Labs     06/14/20  0958 06/15/20  0515   WBC 7.6 6.8   RBC 4.33* 4.08*   HEMOGLOBIN 13.9* 13.4*   HEMATOCRIT 44.2 40.1*   .1* 98.3*   MCH 32.1 32.8   MCHC 31.4* 33.4*   RDW 51.8* 49.5   PLATELETCT 247 222   MPV 10.6 10.4     Recent Labs     06/14/20  0958 06/15/20  0515   SODIUM 136 138   POTASSIUM 3.9 3.8   CHLORIDE 104 107   CO2 23 22   GLUCOSE 103* 111*   BUN 25*  25*   CREATININE 0.96 0.97   CALCIUM 9.4 8.9     Recent Labs     06/14/20  0958   APTT 27.6   INR 1.03         Recent Labs     06/15/20  0515   TRIGLYCERIDE 163*   HDL 32*   LDL 86       Imaging  EC-ECHOCARDIOGRAM COMPLETE W/O CONT   Final Result      MR-BRAIN-W/O   Final Result      1.  Small sized acute infarct in the left posterior parasagittal frontal cortex. No evidence of intracranial hemorrhage or mass lesion.   2.  Small chronic infarcts in the right centrum semiovale and right occipital lobe.   3.  Moderate diffuse cerebral substance loss.   4.  Moderate microangiopathic ischemic change versus demyelination or gliosis.      DX-CHEST-PORTABLE (1 VIEW)   Final Result      No acute cardiopulmonary process is seen.      Atherosclerotic plaque.         CT-CTA NECK WITH & W/O-POST PROCESSING   Final Result      Atherosclerotic plaque in the common carotid arteries, carotid bulbs and proximal internal carotid arteries with less than 50% stenosis.      Mild plaque at the origins of the vertebral arteries bilaterally.      Multiple borderline cervical lymph nodes bilaterally are nonspecific.      Dilated ascending aorta measuring 4 cm.         CT-CTA HEAD WITH & W/O-POST PROCESS   Final Result      No thrombosis is seen within the [place].         CT-HEAD W/O   Final Result      Small area of right occipital hypodensity may represent a subacute to chronic infarct.      There are periventricular and subcortical white matter changes present.  This finding is nonspecific and could be from indeterminate age small vessel ischemia, demyelination, or gliosis.      Focal hypodensities in the basal ganglia and left thalamus are likely related to prior lacunar infarcts..           Assessment/Plan  Stroke (HCC)  Assessment & Plan  He presented with symptoms of slurred speech, left arm weakness and at the time of evaluation his symptoms resolved.  Neurology consulted by ER physician.  He underwent CT head without  contrast and it showed a small area of right occipital hypodensity may represent a subacute to chronic infarct.  MRI brain without contrast showed a small sized acute infarct in the left posterior parasagittal frontal cortex.  No evidence of intracranial hemorrhage or mass lesion.  I ordered echocardiogram and it showed ejection fraction of 60%  Permissive hypertension for 48 hours.  If he continued to have hypotension he may need antihypertensive medication.  Outpatient cardiac monitoring.    Ascending aorta dilatation (HCC)  Assessment & Plan  He found to have dilated ascending aorta measuring 4 cm on CTA.  He would need outpatient close follow-up.    Hyperlipidemia- (present on admission)  Assessment & Plan  Started him on high intensity statin.  Atorvastatin 80 mg daily.         VTE prophylaxis: SCDs

## 2020-06-16 NOTE — DISCHARGE INSTRUCTIONS
Discharge Instructions    Discharged to home by car with relative. Discharged via wheelchair, hospital escort: Yes.  Special equipment needed: Not Applicable    Be sure to schedule a follow-up appointment with your primary care doctor or any specialists as instructed.     Discharge Plan:   Diet Plan: Discussed  Activity Level: Discussed  Smoking Cessation Offered: Patient Refused  Confirmed Follow up Appointment: Appointment Scheduled  Confirmed Symptoms Management: Discussed  Medication Reconciliation Updated: Yes    I understand that a diet low in cholesterol, fat, and sodium is recommended for good health. Unless I have been given specific instructions below for another diet, I accept this instruction as my diet prescription.   Other diet: heart health     Special Instructions: None    · Is patient discharged on Warfarin / Coumadin? No         Clopidogrel tablets  What is this medicine?  CLOPIDOGREL (kloh PID oh grel) helps to prevent blood clots. This medicine is used to prevent heart attack, stroke, or other vascular events in people who are at high risk.  This medicine may be used for other purposes; ask your health care provider or pharmacist if you have questions.  COMMON BRAND NAME(S): Plavix    What should I tell my health care provider before I take this medicine?  They need to know if you have any of the following conditions:  -bleeding disorders  -bleeding in the brain  -having surgery  -history of stomach bleeding  -an unusual or allergic reaction to clopidogrel, other medicines, foods, dyes, or preservatives  -pregnant or trying to get pregnant  -breast-feeding  How should I use this medicine?  Take this medicine by mouth with a glass of water. Follow the directions on the prescription label. You may take this medicine with or without food. If it upsets your stomach, take it with food. Take your medicine at regular intervals. Do not take it more often than directed. Do not stop taking except on your  doctor's advice.  A special MedGuide will be given to you by the pharmacist with each prescription and refill. Be sure to read this information carefully each time.  Talk to your pediatrician regarding the use of this medicine in children. Special care may be needed.  Overdosage: If you think you have taken too much of this medicine contact a poison control center or emergency room at once.  NOTE: This medicine is only for you. Do not share this medicine with others.  What if I miss a dose?  If you miss a dose, take it as soon as you can. If it is almost time for your next dose, take only that dose. Do not take double or extra doses.  What may interact with this medicine?  Do not take this medicine with the following medications:  -dasabuvir; ombitasvir; paritaprevir; ritonavir  -defibrotide  This medicine may also interact with the following medications:  -antiviral medicines for HIV or AIDS  -aspirin  -certain medicines for depression like citalopram, fluoxetine, fluvoxamine  -certain medicines for fungal infections like ketoconazole, fluconazole, voriconazole  -certain medicines for seizures like felbamate, oxcarbazepine, phenytoin  -certain medicines for stomach problems like cimetidine, omeprazole, esomeprazole  -certain medicines that treat or prevent blood clots like warfarin, enoxaparin, dalteparin, apixaban, dabigatran, rivaroxaban, ticlopidine  -chloramphenicol  -cilostazol  -fluvastatin  -isoniazid  -modafinil  -nicardipine  -NSAIDS, medicines for pain and inflammation, like ibuprofen or naproxen  -quinine  -repaglinide  -tamoxifen  -tolbutamide  -topiramate  -torsemide  This list may not describe all possible interactions. Give your health care provider a list of all the medicines, herbs, non-prescription drugs, or dietary supplements you use. Also tell them if you smoke, drink alcohol, or use illegal drugs. Some items may interact with your medicine.  What should I watch for while using this  medicine?  Visit your doctor or health care professional for regular check ups. Do not stop taking your medicine unless your doctor tells you to.  Notify your doctor or health care professional and seek emergency treatment if you develop breathing problems; changes in vision; chest pain; severe, sudden headache; pain, swelling, warmth in the leg; trouble speaking; sudden numbness or weakness of the face, arm or leg. These can be signs that your condition has gotten worse.  If you are going to have surgery or dental work, tell your doctor or health care professional that you are taking this medicine.  Certain genetic factors may reduce the effect of this medicine. Your doctor may use genetic tests to determine treatment.  What side effects may I notice from receiving this medicine?  Side effects that you should report to your doctor or health care professional as soon as possible:  -allergic reactions like skin rash, itching or hives, swelling of the face, lips, or tongue  -signs and symptoms of bleeding such as bloody or black, tarry stools; red or dark-brown urine; spitting up blood or brown material that looks like coffee grounds; red spots on the skin; unusual bruising or bleeding from the eye, gums, or nose  -signs and symptoms of a blood clot such as breathing problems; changes in vision; chest pain; severe, sudden headache; pain, swelling, warmth in the leg; trouble speaking; sudden numbness or weakness of the face, arm or leg  Side effects that usually do not require medical attention (report to your doctor or health care professional if they continue or are bothersome):  -constipation  -diarrhea  -headache  -upset stomach  This list may not describe all possible side effects. Call your doctor for medical advice about side effects. You may report side effects to FDA at 3-351-FDA-5185.  Where should I keep my medicine?  Keep out of the reach of children.  Store at room temperature of 59 to 86 degrees F (15 to  30 degrees C). Throw away any unused medicine after the expiration date.  NOTE: This sheet is a summary. It may not cover all possible information. If you have questions about this medicine, talk to your doctor, pharmacist, or health care provider.  © 2018 Elsevier/Gold Standard (2016-09-22 10:00:44)      Depression / Suicide Risk    As you are discharged from this [facility] facility, it is important to learn how to keep safe from harming yourself.    Recognize the warning signs:  · Abrupt changes in personality, positive or negative- including increase in energy   · Giving away possessions  · Change in eating patterns- significant weight changes-  positive or negative  · Change in sleeping patterns- unable to sleep or sleeping all the time   · Unwillingness or inability to communicate  · Depression  · Unusual sadness, discouragement and loneliness  · Talk of wanting to die  · Neglect of personal appearance   · Rebelliousness- reckless behavior  · Withdrawal from people/activities they love  · Confusion- inability to concentrate     If you or a loved one observes any of these behaviors or has concerns about self-harm, here's what you can do:  · Talk about it- your feelings and reasons for harming yourself  · Remove any means that you might use to hurt yourself (examples: pills, rope, extension cords, firearm)  · Get professional help from the community (Mental Health, Substance Abuse, psychological counseling)  · Do not be alone:Call your Safe Contact- someone whom you trust who will be there for you.  · Call your local CRISIS HOTLINE 877-4388 or 877-963-9862  · Call your local Children's Mobile Crisis Response Team [place] (457) 286-7720 or www.[employer]  · Call the toll free National Suicide Prevention Hotlines   · National Suicide Prevention Lifeline 448-784-TMVJ (7715)  · National Hope Line Network 800-SUICIDE (021-7659)

## 2020-06-16 NOTE — DISCHARGE PLANNING
[facility] Coordination     F/U for Rehab.  Current documentation does not reflect 2/3 therapy needs meeting CMS criteria for IRF level care.  Physiatry consult denied per protocol.      Thank you for the referral.

## 2020-06-16 NOTE — DISCHARGE SUMMARY
Discharge Summary    CHIEF COMPLAINT ON ADMISSION  Chief Complaint   Patient presents with   • Lightheadedness   • Dizziness   • Aphasia       Reason for Admission  WEAKNESS      Admission Date  6/14/2020    CODE STATUS  Full Code    HPI & HOSPITAL COURSE  79-year-old male with past medical history of dyslipidemia presented to the hospital on June 14, 2020 with sent for a stroke, with slurred speech, and left arm weakness.  He underwent MRI brain and he was found to have a small sized acute infarct in the left posterior parasagittal frontal cortex.  He was placed on dual antiplatelets with aspirin and plavix, along with high intensity statin. Neurology was consulted and agreed with the plan. Echocardiogram showed EF of 60%, with mild concentric LVH, aortic sclerosis without stenosis, and mild aortic insufficiency. He found to have dilated ascending aorta of 4 cm. His telemetry monitoring remained sinus.He was seen by PT and SLP recommends no further skilled therapy needs.  OT pending.  His neurologic symptoms have resolved.  He remained hemodynamically stable and afebrile.    I have personally seen and examined the patient on the day of discharge. With his clinical improvement, he was deemed ready to discharge from the hospital as he did not have any further hospitalization needs. Patient felt comfortable going home. The discharge plan was discussed with the patient, with which he was agreeable to.       Therefore, he is discharged in good and stable condition to home with close outpatient follow-up.      Discharge Date  6/16/2020      FOLLOW UP ITEMS POST DISCHARGE  -He will be continued on aspirin and Plavix, along with high intensity statin.  [facility]  He will also be set up with the heart and vascular [place] for a ZIO patch.  -He needs continued outpatient follow-up and serial monitoring of his ascending aortic aneurysm.  He remains asymptomatic from that.  -Follow-up  with PCP.  - counseled to seek immediate medical attention, or return to the ED forrecurrent or worsening symptoms.      DISCHARGE DIAGNOSES  Active Problems:    Stroke (HCC) POA: Yes    Hyperlipidemia POA: Yes      Overview: Pravastin x 10 yrs    Ascending aorta dilatation (HCC) POA: Yes  Resolved Problems:    * No resolved hospital problems. *      FOLLOW UP  Future Appointments   Date Time Provider Department Center   6/17/2020  9:00 AM [facility] None     No follow-up provider specified.    MEDICATIONS ON DISCHARGE     Medication List      START taking these medications      Instructions   aspirin 81 MG EC tablet  Start taking on:  June 17, 2020   Take 1 Tab by mouth every day.  Dose:  81 mg     atorvastatin 80 MG tablet  Commonly known as:  LIPITOR   Take 1 Tab by mouth every evening.  Dose:  80 mg     clopidogrel 75 MG Tabs  Start taking on:  June 17, 2020  Commonly known as:  PLAVIX   Take 1 Tab by mouth every day.  Dose:  75 mg        CONTINUE taking these medications      Instructions   ascorbic acid 500 MG Tabs  Commonly known as:  ascorbic acid   Take 500 mg by mouth every morning.  Dose:  500 mg     multivitamin Tabs   Take 1 Tab by mouth every morning.  Dose:  1 Tab        STOP taking these medications    pravastatin 40 MG tablet  Commonly known as:  PRAVACHOL            Allergies  No Known Allergies    DIET  Orders Placed This Encounter   Procedures   • Diet Order Cardiac     Standing Status:   Standing     Number of Occurrences:   1     Order Specific Question:   Diet:     Answer:   Cardiac [6]       ACTIVITY  As tolerated.  Weight bearing as tolerated    CONSULTATIONS  Neurology    PROCEDURES  none    LABORATORY  Lab Results   Component Value Date    SODIUM 138 06/15/2020    POTASSIUM 3.8 06/15/2020    CHLORIDE 107 06/15/2020    CO2 22 06/15/2020    GLUCOSE 111 (H) 06/15/2020    BUN 25 (H) 06/15/2020    CREATININE 0.97 06/15/2020        Lab Results   Component Value Date    WBC 6.8  06/15/2020    HEMOGLOBIN 13.4 (L) 06/15/2020    HEMATOCRIT 40.1 (L) 06/15/2020    PLATELETCT 222 06/15/2020        Total time of the discharge process = 40 minutes.

## 2020-06-17 ENCOUNTER — OFFICE VISIT (OUTPATIENT)
Dept: NEUROLOGY | Facility: MEDICAL CENTER | Age: 80
End: 2020-06-17
Payer: MEDICARE

## 2020-06-17 VITALS
DIASTOLIC BLOOD PRESSURE: 80 MMHG | WEIGHT: 167.33 LBS | OXYGEN SATURATION: 94 % | SYSTOLIC BLOOD PRESSURE: 130 MMHG | HEART RATE: 79 BPM | BODY MASS INDEX: 22.66 KG/M2 | HEIGHT: 72 IN | TEMPERATURE: 98.9 F | RESPIRATION RATE: 18 BRPM

## 2020-06-17 DIAGNOSIS — I69.30 LATE EFFECT OF STROKE: ICD-10-CM

## 2020-06-17 PROCEDURE — 99215 OFFICE O/P EST HI 40 MIN: CPT | Performed by: NURSE PRACTITIONER

## 2020-06-17 RX ORDER — ATORVASTATIN CALCIUM 80 MG/1
80 TABLET, FILM COATED ORAL EVERY EVENING
Qty: 90 TAB | Refills: 3 | Status: SHIPPED | OUTPATIENT
Start: 2020-06-17 | End: 2020-09-02 | Stop reason: SDUPTHER

## 2020-06-17 ASSESSMENT — ENCOUNTER SYMPTOMS
SHORTNESS OF BREATH: 0
BRUISES/BLEEDS EASILY: 1
NAUSEA: 0
HEADACHES: 0
CHILLS: 0
DEPRESSION: 0
HEARTBURN: 0
WEAKNESS: 0
PALPITATIONS: 0
SENSORY CHANGE: 0
CONSTIPATION: 0
BLURRED VISION: 0
FEVER: 0
SPEECH CHANGE: 0
DIZZINESS: 0
DOUBLE VISION: 0
NECK PAIN: 0
VOMITING: 0
MYALGIAS: 0
DIARRHEA: 0
NERVOUS/ANXIOUS: 0
COUGH: 0

## 2020-06-17 ASSESSMENT — FIBROSIS 4 INDEX: FIB4 SCORE: 1.87

## 2020-06-17 NOTE — PATIENT INSTRUCTIONS
Stroke Prevention plan for Erasto Calhoun    1. Blood pressure goal less than 130/80  2.  LDL (bad cholesterol) goal less than 70, current 86, continue Atorvastatin 80mg, will need follow up with primary care for refills and to monitor liver function  Exercise at least 30 minutes daily, avoid red meat, fried foods, butter, cheese.   Eat 5-6 servings of vegetables and fruits daily, choose lean white meat without skin (chicken, turkey, white fish)--baked, broiled or grilled.    Continue Aspirin 81mg and Clopidrigrel (plavix)  75mg for a total of 30 days only,  Stop (plavix) (7/17/2020), at that time take 81mg aspirin every day.    I ordered a 30 day heart monitor (cardiology consult) and labs to help determine the cause of your stroke        Stroke Prevention  Some health problems and behaviors may make it more likely for you to have a stroke. Below are ways to lessen your risk of having a stroke.  · Be active for at least 30 minutes on most or all days.  · Do not smoke. Try not to be around others who smoke.  · Do not drink too much alcohol.  ¨ Do not have more than 2 drinks a day if you are a man.  ¨ Do not have more than 1 drink a day if you are a woman and are not pregnant.  · Eat healthy foods, such as fruits and vegetables. If you were put on a specific diet, follow the diet as told.  · Keep your cholesterol levels under control through diet and medicines. Look for foods that are low in saturated fat, trans fat, cholesterol, and are high in fiber.  · If you have diabetes, follow all diet plans and take your medicine as told.  · Ask your doctor if you need treatment to lower your blood pressure. If you have high blood pressure (hypertension), follow all diet plans and take your medicine as told by your doctor.  · If you are 18-39 years old, have your blood pressure checked every 3-5 years. If you are age 40 or older, have your blood pressure checked every year.  · Keep a healthy weight. Eat foods that are low in  calories, salt, saturated fat, trans fat, and cholesterol.  · Do not take drugs.  · Avoid birth control pills, if this applies. Talk to your doctor about the risks of taking birth control pills.  · Talk to your doctor if you have sleep problems (sleep apnea).  · Take all medicine as told by your doctor.  ¨ You may be told to take aspirin or blood thinner medicine. Take this medicine as told by your doctor.  ¨ Understand your medicine instructions.  · Make sure any other conditions you have are being taken care of.  Get help right away if:  · You suddenly lose feeling (you feel numb) or have weakness in your face, arm, or leg.  · Your face or eyelid hangs down to one side.  · You suddenly feel confused.  · You have trouble talking (aphasia) or understanding what people are saying.  · You suddenly have trouble seeing in one or both eyes.  · You suddenly have trouble walking.  · You are dizzy.  · You lose your balance or your movements are clumsy (uncoordinated).  · You suddenly have a very bad headache and you do not know the cause.  · You have new chest pain.  · Your heart feels like it is fluttering or skipping a beat (irregular heartbeat).  Do not wait to see if the symptoms above go away. Get help right away. Call your local emergency services (911 in [place] Do not drive yourself to the hospital.   This information is not intended to replace advice given to you by your health care provider. Make sure you discuss any questions you have with your health care provider.  Document Released: 06/18/2013 Document Revised: 05/25/2017 Document Reviewed: 06/20/2014  [employer] Interactive Patient Education © 2017 Elsevier Inc.

## 2020-06-17 NOTE — PROGRESS NOTES
Subjective:    HPI  Erasto Calhoun Jr. is a 79 y.o. male who presents [facility] for evaluation of left frontal cortex infarct.    He presented to [facility] on 6/14/2020 with sudden onset of dizziness, slurred speech and inability to say certain words as well as slight left arm weakness.  His symptoms had resolved by the time he arrived at the hospital.  NIHSS 0 on arrival.    He was not on ASA prior to admission.  He was on Pravachol 40mg prior to admission         Review of Systems   Constitutional: Negative for chills and fever.   HENT: Negative for hearing loss and tinnitus.    Eyes: Negative for blurred vision and double vision.   Respiratory: Negative for cough and shortness of breath.    Cardiovascular: Negative for chest pain and palpitations.   Gastrointestinal: Negative for constipation, diarrhea, heartburn, nausea and vomiting.   Genitourinary: Negative for dysuria.   Musculoskeletal: Negative for myalgias and neck pain.   Skin: Negative for rash.   Neurological: Negative for dizziness, sensory change, speech change, weakness and headaches.   Endo/Heme/Allergies: Bruises/bleeds easily.   Psychiatric/Behavioral: Negative for depression. The patient is not nervous/anxious.        Past Medical History:   Diagnosis Date   • Cataract 01/22/2020    bilateral   • Dental disorder 01/22/2020    upper and lower dentures   • High cholesterol 01/22/2020    on med   • Lupus (systemic lupus erythematosus) (HCC)     Pt reports only symptom was rash, on plaquenil for several yrs, stopped 2004     Current Outpatient Medications on File Prior to Visit   Medication Sig Dispense Refill   • aspirin EC 81 MG EC tablet Take 1 Tab by mouth every day. 30 Tab 3   • atorvastatin (LIPITOR) 80 MG tablet Take 1 Tab by mouth every evening. 30 Tab 1   • clopidogrel (PLAVIX) 75 MG Tab Take 1 Tab by mouth every day. 30 Tab 1   • ascorbic acid (ASCORBIC ACID) 500 MG Tab Take 500 mg by mouth  every morning.     • multivitamin (THERAGRAN) Tab Take 1 Tab by mouth every morning.       No current facility-administered medications on file prior to visit.         Objective:     Encounter Vitals  Standard Vitals  Vitals  Blood Pressure : 130/80  Temperature: 37.2 °C (98.9 °F)  Temp src: Temporal  Pulse: 79  Respiration: 18  Pulse Oximetry: 94 %  Height: 182.9 cm (6')  Weight: 75.9 kg (167 lb 5.3 oz)  Encounter Vitals  Temperature: 37.2 °C (98.9 °F)  Temp src: Temporal  Blood Pressure : 130/80  Pulse: 79  Respiration: 18  Pulse Oximetry: 94 %  Weight: 75.9 kg (167 lb 5.3 oz)  Height: 182.9 cm (6')  BMI (Calculated): 22.69          I have personally reviewed the imaging below and agree with the fndings.    MRI brain:  1.  Small sized acute infarct in the left posterior parasagittal frontal cortex. No evidence of intracranial hemorrhage or mass lesion.  2.  Small chronic infarcts in the right centrum semiovale and right occipital lobe.  3.  Moderate diffuse cerebral substance loss.  4.  Moderate microangiopathic ischemic     CTA head WNL  CTA neck:   Atherosclerotic plaque in the common carotid arteries, carotid bulbs and proximal internal carotid arteries with less than 50% stenosis.     Mild plaque at the origins of the vertebral arteries bilaterally.     Multiple borderline cervical lymph nodes bilaterally are nonspecific.     Dilated ascending aorta measuring 4 cm.    TTE:  LVEF 60%, mild concentric LVH, Aortic sclerosis without stenosis, LA size WNL, ANNALISA 19    Stroke Labs:  LDL 86, A1C 5.1, Cr 0.97,       Physical Exam      General:  Alert, no apparent distress,  Psych:   mood and affect WNL  Neuro:   Oriented X 4, speech fluent, naming and memory intact                 cranial nerves II-XII intact                 Strength 5/5 BUE/BLE, no drift                  Sensation to PP equal bilaterally                 No limb ataxia with finger to nose and heel to shin                 Ambulates with steady gait.                 Biceps,brachioradialis, tricep, patellar and ankle reflex all 2+    Cardiovascular:    S1S2, no abnormal rhythm auscultated, no peripheral edema  Neck:                     No carotid bruits noted   Pulmonary:            Respirations easy, lungs clear to auscultation all fields.    Skin:                     No obvious rashes.        Current NIHSS    1a. LOC: 0  1b. LOC Questions: 0  1c. LOC Commands: 0  2. Best Gaze:0  3. Visual Fields: 0  4. Facial Paresis: 0  5a. Motor arm left: 0  5b. Motor arm right: 0  6a. Motor leg left: 0  6b. Motor leg right: 0  7. Sensory: 0  8. Best Language: 0  9. Limb Ataxia: 0  10. Dysarthria: 0  11. Extinction/Inattention: 0    Total Score Current 0    Current mRS 0         Assessment/Plan:       1. Late effect of stroke,      Presumed mechanism by TOAST:  __Large Artery Atherosclerosis  __Small Vessel (Lacunar)  __Cardioembolic  __Other (Sickle Cell, Vasculitis, Hypercoagulable)  __Unknown  XX__ESUS      - referred to cardiology for 30 day heart monitor (ordered in hospital)  - LUPUS ANTICOAGULANT    Blood pressure goal less than 130/80, currently at goal  LDL goal < 70, current 86 (on Pravachol 40), continue Atorva 80mg, will need follow up with PCP for refills and to monitor liver function, discussed side effects with patient    Continue Plavix 75mg and ASA 81mg for a total of 30 days, then ASA 81mg alone, discussed signs of bleeding, GI side effects.    Discussed signs of stroke, stroke risk factors, testing.    Follow up in 3 months.

## 2020-06-18 ENCOUNTER — HOSPITAL ENCOUNTER (OUTPATIENT)
Dept: LAB | Facility: MEDICAL CENTER | Age: 80
End: 2020-06-18
Attending: NURSE PRACTITIONER
Payer: MEDICARE

## 2020-06-18 PROCEDURE — 85520 HEPARIN ASSAY: CPT

## 2020-06-18 PROCEDURE — 85613 RUSSELL VIPER VENOM DILUTED: CPT

## 2020-06-18 PROCEDURE — 36415 COLL VENOUS BLD VENIPUNCTURE: CPT

## 2020-06-18 PROCEDURE — 85730 THROMBOPLASTIN TIME PARTIAL: CPT

## 2020-06-18 PROCEDURE — 85610 PROTHROMBIN TIME: CPT

## 2020-06-23 ENCOUNTER — OFFICE VISIT (OUTPATIENT)
Dept: MEDICAL GROUP | Facility: PHYSICIAN GROUP | Age: 80
End: 2020-06-23
Payer: MEDICARE

## 2020-06-23 VITALS
OXYGEN SATURATION: 93 % | TEMPERATURE: 99.2 F | DIASTOLIC BLOOD PRESSURE: 64 MMHG | HEIGHT: 72 IN | HEART RATE: 104 BPM | WEIGHT: 162 LBS | SYSTOLIC BLOOD PRESSURE: 100 MMHG | BODY MASS INDEX: 21.94 KG/M2

## 2020-06-23 DIAGNOSIS — E78.2 MIXED HYPERLIPIDEMIA: ICD-10-CM

## 2020-06-23 DIAGNOSIS — I77.810 ASCENDING AORTA DILATATION (HCC): ICD-10-CM

## 2020-06-23 DIAGNOSIS — I63.9 CEREBROVASCULAR ACCIDENT (CVA), UNSPECIFIED MECHANISM (HCC): ICD-10-CM

## 2020-06-23 DIAGNOSIS — Z09 HOSPITAL DISCHARGE FOLLOW-UP: ICD-10-CM

## 2020-06-23 PROCEDURE — 99214 OFFICE O/P EST MOD 30 MIN: CPT | Performed by: PHYSICIAN ASSISTANT

## 2020-06-23 ASSESSMENT — FIBROSIS 4 INDEX: FIB4 SCORE: 1.87

## 2020-06-23 ASSESSMENT — PATIENT HEALTH QUESTIONNAIRE - PHQ9: CLINICAL INTERPRETATION OF PHQ2 SCORE: 0

## 2020-06-23 NOTE — PROGRESS NOTES
Subjective:   Erasto Calhoun Jr. is a 79 y.o. male here today for hospital discharge follow-up for stroke. Is an established patient of mine.    HPI:    Patient presents to the office today in follow-up from recent hospitalization to [facility] from 6/14/2020 through 6/16/2020.  He presented with slurred speech, left arm weakness, and dizziness which spontaneously resolved upon arrival at the hospital.  Was found to have acute infarct in the left posterior parasagittal frontal cortex on brain MRI.  Was started on aspirin, Plavix, and high-dose atorvastatin 80 mg daily which he is tolerating well.  He was also found to have dilated a sending aorta of 4 cm.  He was recently seen last week by [facility].  Was advised to continue dual antiplatelet therapy for 30 days, then continue only aspirin alone.  He also will be undergoing outpatient ZIO patch monitoring next month. Patient states he is feeling generally well other than some fatigue. He denies any recurrence of neurologic symptoms and denies headache, chest pain, dyspnea, or palpitations.     Current medicines (including changes today)  Current Outpatient Medications   Medication Sig Dispense Refill   • CALCIUM PO Take  by mouth.     • atorvastatin (LIPITOR) 80 MG tablet Take 1 Tab by mouth every evening. 90 Tab 3   • aspirin EC 81 MG EC tablet Take 1 Tab by mouth every day. 30 Tab 3   • clopidogrel (PLAVIX) 75 MG Tab Take 1 Tab by mouth every day. 30 Tab 1   • ascorbic acid (ASCORBIC ACID) 500 MG Tab Take 500 mg by mouth every morning.     • multivitamin (THERAGRAN) Tab Take 1 Tab by mouth every morning.       No current facility-administered medications for this visit.      He  has a past medical history of Cataract (01/22/2020), Dental disorder (01/22/2020), High cholesterol (01/22/2020), and Lupus (systemic lupus erythematosus) (HCC).    ROS  As per HPI.       Objective:     /64 (BP Location: Left arm, Patient  Position: Sitting, BP Cuff Size: Adult)   Pulse (!) 104   Temp 37.3 °C (99.2 °F) (Tympanic)   Ht 1.829 m (6')   Wt 73.5 kg (162 lb)   SpO2 93%  Body mass index is 21.97 kg/m².     Physical Exam:  Constitutional: Alert, well-appearing elderly male in no distress.  Skin: No rashes in visible areas.  Eye: Pupils are equal and round, conjunctiva clear, lids normal.  ENMT: Lips without lesions, moist mucus membranes.  Neck: Normal-appearing.  Respiratory: Unlabored respiratory effort, lungs clear to auscultation, no wheezes, no rhonchi.  Cardiovascular: Normal S1, S2, no murmur.      Assessment and Plan:   The following treatment plan was discussed    1. Hospital discharge follow-up  Hospital records reviewed including discharge summary, labs, imaging. Recommendations as per below.    2. Cerebrovascular accident (CVA), unspecified mechanism (HCC)  New problem, now resolved with no concerning symptoms. Counseled patient on new medications today. He will continue aspirin + Plavix for total of 30 days, then continue with just aspirin. He will continue high-intensity statin. Follow up as-scheduled with cardiology for ZIO patch monitoring.    3. Mixed hyperlipidemia  Established problem, well-controlled. Previously on pravastatin but switched to high-dose atorvastatin due to recent stroke. He is tolerating this fine so advised to continue. Recommend re-checking LFTs in 3 months.  - HEPATIC FUNCTION PANEL; Future    4. Ascending aorta dilatation (HCC)  New problem discovered during recent hospitalization. Needs serial monitoring. Recommend re-check in 6 months with chest CTA. If stable, can then transition to yearly surveillance.   - CT-CTA CHEST WITH & W/O-POST PROCESS; Future      Followup: Return for establish care with new PCP.    Azul Cash P.A.-C.

## 2020-06-23 NOTE — PATIENT INSTRUCTIONS
The 3 providers available to establish care with at this clinic are:  1. Dr. Ria Rollins MD  2. Dr. Ana Ohara DO  3. ABHILASH Pan

## 2020-07-09 ENCOUNTER — TELEPHONE (OUTPATIENT)
Dept: CARDIOLOGY | Facility: MEDICAL CENTER | Age: 80
End: 2020-07-09

## 2020-07-09 ENCOUNTER — NON-PROVIDER VISIT (OUTPATIENT)
Dept: CARDIOLOGY | Facility: MEDICAL CENTER | Age: 80
End: 2020-07-09
Payer: MEDICARE

## 2020-07-09 DIAGNOSIS — R00.2 PALPITATIONS: ICD-10-CM

## 2020-07-09 DIAGNOSIS — I49.1 PAC (PREMATURE ATRIAL CONTRACTION): ICD-10-CM

## 2020-07-09 DIAGNOSIS — G45.9 TRANSIENT CEREBRAL ISCHEMIA, UNSPECIFIED TYPE: ICD-10-CM

## 2020-07-09 NOTE — TELEPHONE ENCOUNTER
Patient enrolled in the 14 day Zio patch program per Kip Seymour MD.  (enrollment under ADD [facility])    >Currently pending EOS.

## 2020-07-20 ENCOUNTER — PATIENT OUTREACH (OUTPATIENT)
Dept: HEALTH INFORMATION MANAGEMENT | Facility: OTHER | Age: 80
End: 2020-07-20

## 2020-07-21 NOTE — PROGRESS NOTES
An 79-year-old male was an emergent admission to [facility] from 6/14/2020 to 6/16/2020 to treat Transient cerebral ischemic attack, unspecified. The Patient Navigator visited the patient bedside. The patient was discharged home. The patient's medical condition included: Stroke (Cerebrovascular Accident (CVA), Blood clot, Carotid Artery Disease, Transient Ischaemic Attack (TIA)). The patient was not under clinical case management.     The patient was ordered to start/continue to take the following medications: Clopidogrel Bisulfate (Plavix), Atorvastatin Calcium (Lipitor), and Aspirin. The patient successfully filled all medications.     The patient was ordered to follow-up with PCP and Specialist. The patient had the following appointments:     1) 6/17/2020 @ 9:00 [facility], physician/neurology - CONFIRMED AS KEPT     2) 6/18/2020 @ 1:23 Rojas Bunn, physician/internal medicine - PATIENT CANCELLED     3) 6/23/2020 @ 1:45 Azul Cash, physician/family practice - CONFIRMED AS KEPT     4) 7/9/2020 @ 2:30 VASCULAR NURSE PRACTITIONER, physician/vascular surgery - CONFIRMED AS KEPT  The patient has a future appointment scheduled for 9/2/2020 @ 1:20 [facility], physician/neurology     The Patient Navigator identified that the patient had Healthcare Access barriers. Completed Referral for In-Network Provider.     The patient  was receptive to services. The Patient Navigator Identified Social Determinants of Health  and Provided education to patient. Patient Navigator redirected patient to a more appropriate level of care, Patient’s condition or quality of life improved, and Patient successfully recovered or avoided further complications.

## 2020-07-28 NOTE — PROGRESS NOTES
Called member  for [employer] List. Member had no questions at this time, direct phone number given for future contact.

## 2020-07-29 ENCOUNTER — OFFICE VISIT (OUTPATIENT)
Dept: MEDICAL GROUP | Facility: PHYSICIAN GROUP | Age: 80
End: 2020-07-29
Payer: MEDICARE

## 2020-07-29 VITALS
HEART RATE: 73 BPM | DIASTOLIC BLOOD PRESSURE: 60 MMHG | TEMPERATURE: 99.1 F | WEIGHT: 159 LBS | BODY MASS INDEX: 21.54 KG/M2 | HEIGHT: 72 IN | SYSTOLIC BLOOD PRESSURE: 140 MMHG | OXYGEN SATURATION: 96 %

## 2020-07-29 DIAGNOSIS — I77.810 ASCENDING AORTA DILATATION (HCC): ICD-10-CM

## 2020-07-29 DIAGNOSIS — Z86.73 HISTORY OF CVA (CEREBROVASCULAR ACCIDENT): ICD-10-CM

## 2020-07-29 PROCEDURE — 99213 OFFICE O/P EST LOW 20 MIN: CPT | Performed by: PHYSICIAN ASSISTANT

## 2020-07-29 ASSESSMENT — FIBROSIS 4 INDEX: FIB4 SCORE: 1.87

## 2020-07-29 NOTE — PROGRESS NOTES
Subjective:   Erasto Calhoun Jr. is a 79 y.o. male here today for follow-up on CVA. Is an established patient of mine.    HPI:    Patient presents to the office today for routine follow-up on above. Mostly is wanting to check in one last time before I leave. I last saw him last month after hospitalization for CVA. He has since followed up with [facility] clinic. Continues on high-dose atorvastatin, aspirin and Plavix and is wondering if he needs to continue the latter as he is almost out of medication. Recently finished up with 14-day ZIO patch monitoring. He states that he has been feeling better over the last month. Appetite has improved and he is much less fatigued.      Current medicines (including changes today)  Current Outpatient Medications   Medication Sig Dispense Refill   • CALCIUM PO Take  by mouth.     • atorvastatin (LIPITOR) 80 MG tablet Take 1 Tab by mouth every evening. 90 Tab 3   • aspirin EC 81 MG EC tablet Take 1 Tab by mouth every day. 30 Tab 3   • clopidogrel (PLAVIX) 75 MG Tab Take 1 Tab by mouth every day. 30 Tab 1   • ascorbic acid (ASCORBIC ACID) 500 MG Tab Take 500 mg by mouth every morning.     • multivitamin (THERAGRAN) Tab Take 1 Tab by mouth every morning.       No current facility-administered medications for this visit.      He  has a past medical history of Cataract (01/22/2020), Dental disorder (01/22/2020), High cholesterol (01/22/2020), and Lupus (systemic lupus erythematosus) ([facility]).    ROS  As per HPI.       Objective:     /60 (BP Location: Right arm, Patient Position: Sitting, BP Cuff Size: Adult)   Pulse 73   Temp 37.3 °C (99.1 °F) (Temporal)   Ht 1.829 m (6')   Wt 72.1 kg (159 lb)   SpO2 96%  Body mass index is 21.56 kg/m².     Physical Exam:  Constitutional: Alert, well-appearing, very pleasant elderly gentleman in no distress.  Skin: No rashes in visible areas.  Eye: Pupils are equal and round, conjunctiva clear, lids normal.  ENMT: Lips without  lesions, moist mucus membranes.  Neck: Normal-appearing.  Respiratory: Unlabored respiratory effort.      Assessment and Plan:   The following treatment plan was discussed    1. History of CVA (cerebrovascular accident)  Doing well post-CVA. Continues to clinically improve. On appropriate medical therapy. We discussed that he no longer needs to take Plavix but should continue daily baby aspirin along with the atorvastatin. He has another follow-up with the stroke bridge clinic in September.    2. Ascending aorta dilatation (HCC)  Established problem, stable and discussed at last OV. Patient is aware that he needs to repeat chest CTA at [facility] which will be in December.      Followup: Return for establish care with new PCP as scheduled.    Azul Cash P.A.-C.

## 2020-08-03 ENCOUNTER — TELEPHONE (OUTPATIENT)
Dept: MEDICAL GROUP | Facility: PHYSICIAN GROUP | Age: 80
End: 2020-08-03

## 2020-08-03 PROCEDURE — 0298T PR EXT ECG > 48HR TO 21 DAY REVIEW AND INTERPRETATN: CPT | Performed by: INTERNAL MEDICINE

## 2020-08-03 PROCEDURE — 0296T PR EXT ECG > 48HR TO 21 DAY RCRD W/CONECT INTL RCRD: CPT | Performed by: INTERNAL MEDICINE

## 2020-08-03 NOTE — TELEPHONE ENCOUNTER
ESTABLISHED PATIENT PRE-VISIT PLANNING     Patient was NOT contacted to complete PVP.    1.  Reviewed notes from the last few office visits within the medical group: Yes    2.  If any orders were placed at last visit or intended to be done for this visit (i.e. 6 mos follow-up), do we have Results/Consult Notes?        •  Labs - Labs ordered, but not to be completed until 9/23/2020.       •  Imaging - Imaging ordered, NOT completed. Patient advised to complete prior to next appointment.       •  Referrals - No referrals were ordered at last office visit.    3. Is this appointment scheduled as a Hospital Follow-Up? No    4.  Immunizations were updated in Epic using WebIZ?: UNABLE TO CHECK WEBIZ       •  Web Iz Recommendations:     5.  Patient is due for the following Health Maintenance Topics:   There are no preventive care reminders to display for this patient.    6. Orders for overdue Health Maintenance topics pended in Pre-Charting? NO    7.  AHA (MDX) form printed for Provider? YES    8.  Patient was NOT informed to arrive 15 min prior to their scheduled appointment and bring in their medication bottles.

## 2020-08-12 ENCOUNTER — OFFICE VISIT (OUTPATIENT)
Dept: MEDICAL GROUP | Facility: PHYSICIAN GROUP | Age: 80
End: 2020-08-12
Payer: MEDICARE

## 2020-08-12 VITALS
HEIGHT: 72 IN | BODY MASS INDEX: 21.54 KG/M2 | SYSTOLIC BLOOD PRESSURE: 132 MMHG | OXYGEN SATURATION: 96 % | WEIGHT: 159 LBS | DIASTOLIC BLOOD PRESSURE: 72 MMHG | HEART RATE: 65 BPM | TEMPERATURE: 98 F

## 2020-08-12 DIAGNOSIS — E78.5 DYSLIPIDEMIA: ICD-10-CM

## 2020-08-12 DIAGNOSIS — Z86.73 HISTORY OF CVA (CEREBROVASCULAR ACCIDENT): ICD-10-CM

## 2020-08-12 PROCEDURE — 8041 PR SCP AHA: Performed by: INTERNAL MEDICINE

## 2020-08-12 PROCEDURE — 99214 OFFICE O/P EST MOD 30 MIN: CPT | Performed by: INTERNAL MEDICINE

## 2020-08-12 ASSESSMENT — FIBROSIS 4 INDEX: FIB4 SCORE: 1.87

## 2020-08-12 NOTE — PROGRESS NOTES
New Patient to establish    Chief Complaint   Patient presents with   • Establish Care       Subjective:     History of Present Illness: Patient is a 79 y.o. male who is here today to establish primary care    1. History of CVA (cerebrovascular accident)  Dyslipidemia  -Stable, no new symptoms, compliant with aspirin 81 mg daily, Plavix 75 mg daily as well as Lipitor 80 mg daily  -Mention is feeling tired and fatigue since starting this medication which might be side effect from high-dose statin  - Patient otherwise is active, no other complaints, follow-up with neurology in September 2020      Current Outpatient Medications on File Prior to Visit   Medication Sig Dispense Refill   • CALCIUM PO Take  by mouth.     • atorvastatin (LIPITOR) 80 MG tablet Take 1 Tab by mouth every evening. 90 Tab 3   • aspirin EC 81 MG EC tablet Take 1 Tab by mouth every day. 30 Tab 3   • ascorbic acid (ASCORBIC ACID) 500 MG Tab Take 500 mg by mouth every morning.     • multivitamin (THERAGRAN) Tab Take 1 Tab by mouth every morning.       No current facility-administered medications on file prior to visit.      No Known Allergies  Patient Active Problem List    Diagnosis Date Noted   • History of CVA (cerebrovascular accident) 06/14/2020     Priority: High   • Ascending aorta dilatation (HCC) 06/14/2020   • Indirect hyperbilirubinemia 12/15/2017   • Hyperlipidemia 01/09/2017     Past Medical History:   Diagnosis Date   • Cataract 01/22/2020    bilateral   • Dental disorder 01/22/2020    upper and lower dentures   • High cholesterol 01/22/2020    on med   • Lupus (systemic lupus erythematosus) (HCC)     Pt reports only symptom was rash, on plaquenil for several yrs, stopped 2004     Past Surgical History:   Procedure Laterality Date   • CATARACT PHACO WITH IOL Left 2/11/2020    Procedure: PHACOEMULSIFICATION, CATARACT, WITH IOL INSERTION;  Surgeon: Kiran Jang M.D.;  Location: SURGERY SAME DAY [facility];  Service: Ophthalmology    • CATARACT PHACO WITH IOL Right 2020    Procedure: PHACOEMULSIFICATION, CATARACT, WITH IOL INSERTION;  Surgeon: Kiran Jang M.D.;  Location: SURGERY SAME DAY [facility];  Service: Ophthalmology   • OTHER SURGICAL PROCEDURE Right     right heel injury   • CAST APPLICATION      climbing, collar bone injury (7th grade)   • OPEN REDUCTION       Family History   Problem Relation Age of Onset   • Cancer Mother         skin cancer   • Stroke Mother 92   • Diabetes Mother    • Hypertension Father    • Heart Disease Father 55        father passed from heart attack   • No Known Problems Brother    • No Known Problems Brother    • Cancer Brother         throat ca   • Alcohol/Drug Brother         heavy drinker     Social History     Tobacco Use   • Smoking status: Former Smoker     Packs/day: 1.00     Years: 4.00     Pack years: 4.00     Types: Cigarettes     Quit date: 1962     Years since quittin.6   • Smokeless tobacco: Never Used   • Tobacco comment: quit    Substance Use Topics   • Alcohol use: Yes     Alcohol/week: 0.6 oz     Types: 1 Shots of liquor per week     Comment: 1 per week   • Drug use: No       ROS:     - Constitutional: Negative for fever, chills,    - Eye: Negative for blurry vision    -ENT: Negative for ear pain    - Respiratory: Negative for cough, hemoptysis    - Cardiovascular: Negative for chest pain     - Gastrointestinal: Negative for abdominal pain    - Genitourinary: Negative for dysuria    - Musculoskeletal: Negative for joint swelling    - Skin: Negative for itching    - Neurological: Negative for focal weakness     - Psychiatric/Behavioral: Negative for depression        Physical Exam:     /72 (BP Location: Right arm, Patient Position: Sitting, BP Cuff Size: Adult)   Pulse 65   Temp 36.7 °C (98 °F) (Temporal)   Ht 1.829 m (6')   Wt 72.1 kg (159 lb)   SpO2 96%   BMI 21.56 kg/m²   General: Normal appearing. No distress.  ENT: oropharynx without exudates.     Eyes: conjunctiva clear lids without ptosis  Pulmonary: Clear to ausculation.  Normal effort.   Cardiovascular: Regular rate and rhythm  Abdomen: Soft, nontender,  Lymph: No cervical or supraclavicular palpable lymph nodes  Psych: Normal mood and affect.     I have reviewed pertinent labs and diagnostic tests associated with this visit with specific comments listed under the assessment and plan below      Assessment and Plan:     1. History of CVA (cerebrovascular accident)  -Dyslipidemia  - Continue aspirin 81 mg daily, Lipitor 80 mg daily, discontinue Plavix since it has been more than a month patient taking after [id number] with [facility]  >> Cardiac event monitor results August 3, 2020: Unremarkable, no evidence of A. fib or a flutter    >> Initially in the visit blood pressure was 142/80, repeated blood pressure negative the visit was 132/72    -discussion in details on target blood pressure goal, advised monitoring BP closely at home.  Have BP log to present at follow-up visit or send through my chart.   -Advised low-salt diet, healthy dietary option, a lot of vegetables, less carbs and no added sugars, regular exercise, also try to avoid alcohol, no NSAIDs      Follow Up:      Return in about 3 months (around 11/12/2020) for follow up.  Blood pressure check    Please note that this dictation was created using voice recognition software. I have made every reasonable attempt to correct obvious errors, but I expect that there are errors of grammar and possibly content that I did not discover before finalizing the note.    Signed by: Ria Rollins M.D.                    Annual Health Assessment Questions:    1.  Are you currently engaging in any exercise or physical activity? Yes    2.  How would you describe your mood or emotional well-being today? good    3.  Have you had any falls in the last year? Yes    4.  Have you noticed any problems with your balance or had difficulty walking?  No    5.  In the last six months have you experienced any leakage of urine? No    6. DPA/Advanced Directive: Patient does not have an Advanced Directive.  A packet and workshop information was given on Advanced Directives.

## 2020-08-12 NOTE — PATIENT INSTRUCTIONS
-For blood pressure measurement, need to be quietly seated, not talking, in a chair for ?3 to 5 minutes, with the back supported, feet on the floor, legs uncrossed, and the arm bared and supported on a flat surface, with the upper arm at the heart level. The cuff size should be appropriately chosen to ensure that the bladder encircles at least 80% of the upper arm.     -Check blood pressure before breakfast after using bathroom as well as before dinner 3 days a week, preferably one weekend day to be included,  (both right and left arm).        Today, your Healthcare Provider may have discussed the following recommendations:    1. Exercise and Physical Activity  According to the American Heart Association, it is recommended to engage in physical activity regularly and to aim for 150 minutes of moderate-intensity aerobic activity per week.  Your Healthcare Provider may have recommended taking the stairs instead of the elevator, starting or maintaining a walking program or strength-training program.    2. Emotional Well-being  Mental and emotional well-being is essential to overall health.  Your Healthcare Provider may have encouraged you to build strong, positive relationships with family and friends, become more involved in your community (by volunteering or joining a spiritual community), or focus on self-care.    3. Fall and Injury Prevention  To prevent falls and injuries and also improve your balance, your Healthcare Provider may have suggested that you use a cane or walker, start an exercise of physical therapy program, or have your vision and/or hearing tested.    4. Urinary Leakage (Urinary Incontinence)  To control or manage the leakage of urine, your Healthcare Provider may have recommended you start bladder training exercises (such as Kegel exercises), a trial of a medication or a referral to see a specialist to discuss surgical options.

## 2020-09-02 ENCOUNTER — OFFICE VISIT (OUTPATIENT)
Dept: NEUROLOGY | Facility: MEDICAL CENTER | Age: 80
End: 2020-09-02
Payer: MEDICARE

## 2020-09-02 VITALS
BODY MASS INDEX: 21.83 KG/M2 | DIASTOLIC BLOOD PRESSURE: 70 MMHG | SYSTOLIC BLOOD PRESSURE: 128 MMHG | RESPIRATION RATE: 16 BRPM | HEART RATE: 78 BPM | WEIGHT: 161.16 LBS | HEIGHT: 72 IN | OXYGEN SATURATION: 93 % | TEMPERATURE: 97.8 F

## 2020-09-02 DIAGNOSIS — I47.19 ATRIAL TACHYCARDIA (HCC): ICD-10-CM

## 2020-09-02 DIAGNOSIS — I69.30 LATE EFFECT OF STROKE: ICD-10-CM

## 2020-09-02 PROCEDURE — 99214 OFFICE O/P EST MOD 30 MIN: CPT | Performed by: NURSE PRACTITIONER

## 2020-09-02 RX ORDER — ASPIRIN 81 MG/1
81 TABLET ORAL DAILY
Qty: 100 TAB | Refills: 2 | Status: ON HOLD
Start: 2020-09-02 | End: 2020-12-01

## 2020-09-02 RX ORDER — ATORVASTATIN CALCIUM 80 MG/1
80 TABLET, FILM COATED ORAL EVERY EVENING
Qty: 100 TAB | Refills: 2 | Status: SHIPPED | OUTPATIENT
Start: 2020-09-02 | End: 2021-08-12 | Stop reason: SDUPTHER

## 2020-09-02 ASSESSMENT — ENCOUNTER SYMPTOMS
DOUBLE VISION: 0
DIARRHEA: 0
NERVOUS/ANXIOUS: 1
FEVER: 0
MYALGIAS: 1
HEARTBURN: 0
HEADACHES: 0
CONSTIPATION: 0
ABDOMINAL PAIN: 0
DEPRESSION: 0
SHORTNESS OF BREATH: 0
PALPITATIONS: 0
NAUSEA: 0
DIZZINESS: 0
VOMITING: 0
BLURRED VISION: 0
BRUISES/BLEEDS EASILY: 1
COUGH: 0

## 2020-09-02 ASSESSMENT — FIBROSIS 4 INDEX: FIB4 SCORE: 1.87

## 2020-09-02 NOTE — PATIENT INSTRUCTIONS
Stroke Prevention  Some medical conditions and lifestyle choices can lead to a higher risk for a stroke. You can help to prevent a stroke by making nutrition, lifestyle, and other changes.  What nutrition changes can be made?    · Eat healthy foods.  ? Choose foods that are high in fiber. These include:  § Fresh fruits.  § Fresh vegetables.  § Whole grains.  ? Eat at least 5 or more servings of fruits and vegetables each day. Try to fill half of your plate at each meal with fruits and vegetables.  ? Choose lean protein foods. These include:  § Lowfat (lean) cuts of meat.  § Chicken without skin.  § Fish.  § Tofu.  § Beans.  § Nuts.  ? Eat low-fat dairy products.  ? Avoid foods that:  § Are high in salt (sodium).  § Have saturated fat.  § Have trans fat.  § Have cholesterol.  § Are processed.  § Are premade.  · Follow eating guidelines as told by your doctor. These may include:  ? Reducing how many calories you eat and drink each day.  ? Limiting how much salt you eat or drink each day to 1,500 milligrams (mg).  ? Using only healthy fats for cooking. These include:  § Olive oil.  § Canola oil.  § Sunflower oil.  ? Counting how many carbohydrates you eat and drink each day.  What lifestyle changes can be made?  · Try to stay at a healthy weight. Talk to your doctor about what a good weight is for you.  · Get at least 30 minutes of moderate physical activity at least 5 days a week. This can include:  ? Fast walking.  ? Biking.  ? Swimming.  · Do not use any products that have nicotine or tobacco. This includes cigarettes and e-cigarettes. If you need help quitting, ask your doctor. Avoid being around tobacco smoke in general.  · Limit how much alcohol you drink to no more than 1 drink a day for nonpregnant women and 2 drinks a day for men. One drink equals 12 oz of beer, 5 oz of wine, or 1½ oz of hard liquor.  · Do not use drugs.  · Avoid taking birth control pills. Talk to your doctor about the risks of taking birth  "control pills if:  ? You are over 35 years old.  ? You smoke.  ? You get migraines.  ? You have had a blood clot.  What other changes can be made?  · Manage your cholesterol.  ? It is important to eat a healthy diet.  ? If your cholesterol cannot be managed through your diet, you may also need to take medicines. Take medicines as told by your doctor.  · Manage your diabetes.  ? It is important to eat a healthy diet and to exercise regularly.  ? If your blood sugar cannot be managed through diet and exercise, you may need to take medicines. Take medicines as told by your doctor.  · Control your high blood pressure (hypertension).  ? Try to keep your blood pressure below 130/80. This can help lower your risk of stroke.  ? It is important to eat a healthy diet and to exercise regularly.  ? If your blood pressure cannot be managed through diet and exercise, you may need to take medicines. Take medicines as told by your doctor.  ? Ask your doctor if you should check your blood pressure at home.  ? Have your blood pressure checked every year. Do this even if your blood pressure is normal.  · Talk to your doctor about getting checked for a sleep disorder. Signs of this can include:  ? Snoring a lot.  ? Feeling very tired.  · Take over-the-counter and prescription medicines only as told by your doctor. These may include aspirin or blood thinners (antiplatelets or anticoagulants).  · Make sure that any other medical conditions you have are managed.  Where to find more information  · American Stroke Association: www.strokeassociation.org  · National Stroke Association: www.stroke.org  Get help right away if:  · You have any symptoms of stroke. \"BE FAST\" is an easy way to remember the main warning signs:  ? B - Balance. Signs are dizziness, sudden trouble walking, or loss of balance.  ? E - Eyes. Signs are trouble seeing or a sudden change in how you see.  ? F - Face. Signs are sudden weakness or loss of feeling of the face, " or the face or eyelid drooping on one side.  ? A - Arms. Signs are weakness or loss of feeling in an arm. This happens suddenly and usually on one side of the body.  ? S - Speech. Signs are sudden trouble speaking, slurred speech, or trouble understanding what people say.  ? T - Time. Time to call emergency services. Write down what time symptoms started.  · You have other signs of stroke, such as:  ? A sudden, very bad headache with no known cause.  ? Feeling sick to your stomach (nausea).  ? Throwing up (vomiting).  ? Jerky movements you cannot control (seizure).  These symptoms may represent a serious problem that is an emergency. Do not wait to see if the symptoms will go away. Get medical help right away. Call your local emergency services (911 in the [place] Do not drive yourself to the hospital.  Summary  · You can prevent a stroke by eating healthy, exercising, not smoking, drinking less alcohol, and treating other health problems, such as diabetes, high blood pressure, or high cholesterol.  · Do not use any products that contain nicotine or tobacco, such as cigarettes and e-cigarettes.  · Get help right away if you have any signs or symptoms of a stroke.  This information is not intended to replace advice given to you by your health care provider. Make sure you discuss any questions you have with your health care provider.  Document Released: 06/18/2013 Document Revised: 02/13/2020 Document Reviewed: 03/21/2018  Elsevier Patient Education © 2020 [employer]

## 2020-09-02 NOTE — PROGRESS NOTES
Subjective:    HPI    Erasto Calhoun Jr. is a 79 y.o. male who presents to the [facility] for follow up of left frontal cortex infarct.    I last saw him 6/17/2020     He presented to [facility] on 6/14/2020 with sudden onset of dizziness, slurred speech and inability to say certain words as well as slight left arm weakness.  His symptoms had resolved by the time he arrived at the hospital.  NIHSS 0 on arrival.     He was not on ASA prior to admission.  He was on Pravachol 40mg prior to admission    Review of Systems   Constitutional: Negative for fever.   HENT: Negative for hearing loss and tinnitus.    Eyes: Negative for blurred vision and double vision.   Respiratory: Negative for cough and shortness of breath.    Cardiovascular: Negative for chest pain and palpitations.   Gastrointestinal: Negative for abdominal pain, constipation, diarrhea, heartburn, nausea and vomiting.   Musculoskeletal: Positive for myalgias.   Skin: Negative for rash.   Neurological: Negative for dizziness and headaches.   Endo/Heme/Allergies: Bruises/bleeds easily.   Psychiatric/Behavioral: Negative for depression. The patient is nervous/anxious.        Current Outpatient Medications on File Prior to Visit   Medication Sig Dispense Refill   • CALCIUM PO Take  by mouth.     • atorvastatin (LIPITOR) 80 MG tablet Take 1 Tab by mouth every evening. 90 Tab 3   • aspirin EC 81 MG EC tablet Take 1 Tab by mouth every day. 30 Tab 3   • ascorbic acid (ASCORBIC ACID) 500 MG Tab Take 500 mg by mouth every morning.     • multivitamin (THERAGRAN) Tab Take 1 Tab by mouth every morning.       No current facility-administered medications on file prior to visit.      Past Medical History:   Diagnosis Date   • Cataract 01/22/2020    bilateral   • Dental disorder 01/22/2020    upper and lower dentures   • High cholesterol 01/22/2020    on med   • Hyperlipidemia 1/9/2017    Pravastin x 10 yrs    • Lupus (systemic lupus  erythematosus) (HCC)     Pt reports only symptom was rash, on plaquenil for several yrs, stopped 2004        Objective:      I have personally reviewed the imaging below and agree with the fndings.     MRI brain:  1.  Small sized acute infarct in the left posterior parasagittal frontal cortex. No evidence of intracranial hemorrhage or mass lesion.  2.  Small chronic infarcts in the right centrum semiovale and right occipital lobe.  3.  Moderate diffuse cerebral substance loss.  4.  Moderate microangiopathic ischemic      CTA head WNL  CTA neck:   Atherosclerotic plaque in the common carotid arteries, carotid bulbs and proximal internal carotid arteries with less than 50% stenosis.     Mild plaque at the origins of the vertebral arteries bilaterally.     Multiple borderline cervical lymph nodes bilaterally are nonspecific.     Dilated ascending aorta measuring 4 cm.     TTE:  LVEF 60%, mild concentric LVH, Aortic sclerosis without stenosis, LA size WNL, ANNALISA 19     Stroke Labs:  LDL 86, A1C 5.1, Cr 0.97,         Physical Exam      Constitutional:  Alert, no apparent distress,  Psych:   mood and affect WNL  Neuro:  Oriented X 4, speech fluent, naming and memory intact    CN II: Visual fields are full to confrontation. Fundoscopic exam is normal with sharp discs and no vascular changes. Pupils are 4 mm and briskly reactive to light.   CN III, IV, VI  EOMs intact, no ptosis  CN V: Facial sensation is intact to pinprick in all 3 divisions bilaterally. Corneal responses are intact.  CN VII: Face is symmetric with normal eye closure and smile.  CN VII: Hearing is normal to rubbing fingers  CN IX, X: Palate elevates symmetrically. Phonation is normal.  CN XI: Head turning and shoulder shrug are intact  CN XII: Tongue is midline with normal movements and no atrophy.              Strength 5/5 BUE/BLE, no drift                 Sensation to PP equal bilaterally                 slight pass pointing left finger to nose,  otherwise, no ataxia.                 Ambulates with steady gait.                 Rhomberg negative     Cardiovascular:    S1S2, no abnormal rhythm auscultated, no peripheral edema  Neck:                     No carotid bruits noted   Pulmonary:            Respirations easy, lungs clear to auscultation all fields.     Skin:                     No obvious rashes.    Iniital NIHSS  0      Current NIHSS:    1a. LOC: 0  1b. LOC Questions: 0  1c. LOC Commands:0   2. Best Gaze:0  3. Visual Fields: 0  4. Facial Paresis: 0  5a. Motor arm left: 0  5b. Motor arm right: 0  6a. Motor leg left: 0  6b. Motor leg right: 0  7. Sensory: 0  8. Best Language: 0  9. Limb Ataxia: 1  10. Dysarthria: 0  11. Extinction/Inattention: 0    Total Score Current  1        Current mRS  0               Assessment/Plan:     1. Late effect of stroke           Presumed mechanism by TOAST:  __Large Artery Atherosclerosis  __Small Vessel (Lacunar)  __Cardioembolic  __Other (Sickle Cell, Vasculitis, Hypercoagulable)  __Unknown  XX__ESUS        - referred to cardiology for 30 day heart monitor, he had a 14 day monitor, no atrial fibrillation, there were several runs of SVT that may have been atrial tachycardia.  I will refer back to cardiology for possible Loop recorder.    Hypercoagulable panel     Blood pressure goal less than 130/80, currently at goal  LDL goal < 70, current 86 (on Pravachol 40), continue Atorva 80mg, will need follow up with PCP for refills and to monitor liver function, discussed side effects with patient     Continue ASA 81mg . discussed signs of bleeding, GI side effects.     Discussed signs of stroke, stroke risk factors, testing.    I refilled the ASA 81mg and the Lipitor 80mg today for 100 tabs with 2 refills (per patient, Rx has to be for 100 tabs).  Future refills per PCP.     Follow up PRN

## 2020-09-11 ENCOUNTER — PATIENT OUTREACH (OUTPATIENT)
Dept: HEALTH INFORMATION MANAGEMENT | Facility: OTHER | Age: 80
End: 2020-09-11

## 2020-09-11 NOTE — PROGRESS NOTES
Mbr called wanting to know why he was charged 0.49 cents for medication aspirin 81 MG EC tablet through mail order. Mbr stated [facility] stated this is what [facility] charges. Mbr stated he never has to pay for medication so why is he being charged .49 cents. Adv I will contact [facility] pharmacy to see what is going on. Adv once I get information I will call him back at cell #. Mbr agreed. Received assistance from [employer]. Confirmed med copayment is .49 cents and this is the first time med was charged so that would be the correct amount.    Lvm with pt to give me a cb in regards to medication. Angeline called back and was given info above

## 2020-09-22 ENCOUNTER — TELEPHONE (OUTPATIENT)
Dept: CARDIOLOGY | Facility: MEDICAL CENTER | Age: 80
End: 2020-09-22

## 2020-09-22 NOTE — TELEPHONE ENCOUNTER
LVM asking if he has seen a Cardiologist in the past or had any cardiac testing done outside of [facility].  If so to let me know so I can request records for appointment on 9/25/2020.

## 2020-09-22 NOTE — TELEPHONE ENCOUNTER
Patient calling back, he only had testing done at [facility] and seen 's here at [facility] as well

## 2020-09-23 ENCOUNTER — HOSPITAL ENCOUNTER (OUTPATIENT)
Dept: LAB | Facility: MEDICAL CENTER | Age: 80
End: 2020-09-23
Attending: PHYSICIAN ASSISTANT
Payer: MEDICARE

## 2020-09-23 ENCOUNTER — TELEPHONE (OUTPATIENT)
Dept: CARDIOLOGY | Facility: MEDICAL CENTER | Age: 80
End: 2020-09-23

## 2020-09-23 DIAGNOSIS — E78.2 MIXED HYPERLIPIDEMIA: ICD-10-CM

## 2020-09-23 LAB
ALBUMIN SERPL BCP-MCNC: 4 G/DL (ref 3.2–4.9)
ALP SERPL-CCNC: 78 U/L (ref 30–99)
ALT SERPL-CCNC: 30 U/L (ref 2–50)
AST SERPL-CCNC: 30 U/L (ref 12–45)
BILIRUB CONJ SERPL-MCNC: 0.5 MG/DL (ref 0.1–0.5)
BILIRUB INDIRECT SERPL-MCNC: 1 MG/DL (ref 0–1)
BILIRUB SERPL-MCNC: 1.5 MG/DL (ref 0.1–1.5)
PROT SERPL-MCNC: 6.9 G/DL (ref 6–8.2)

## 2020-09-23 PROCEDURE — 36415 COLL VENOUS BLD VENIPUNCTURE: CPT

## 2020-09-23 PROCEDURE — 80076 HEPATIC FUNCTION PANEL: CPT

## 2020-09-25 ENCOUNTER — OFFICE VISIT (OUTPATIENT)
Dept: CARDIOLOGY | Facility: MEDICAL CENTER | Age: 80
End: 2020-09-25
Payer: MEDICARE

## 2020-09-25 VITALS
HEART RATE: 60 BPM | HEIGHT: 72 IN | BODY MASS INDEX: 21.81 KG/M2 | WEIGHT: 161 LBS | RESPIRATION RATE: 18 BRPM | DIASTOLIC BLOOD PRESSURE: 80 MMHG | OXYGEN SATURATION: 98 % | SYSTOLIC BLOOD PRESSURE: 152 MMHG

## 2020-09-25 DIAGNOSIS — I63.9 CEREBROVASCULAR ACCIDENT (CVA), UNSPECIFIED MECHANISM (HCC): ICD-10-CM

## 2020-09-25 DIAGNOSIS — I47.19 ATRIAL TACHYCARDIA (HCC): ICD-10-CM

## 2020-09-25 DIAGNOSIS — R03.0 ELEVATED BLOOD PRESSURE READING: ICD-10-CM

## 2020-09-25 LAB — EKG IMPRESSION: NORMAL

## 2020-09-25 PROCEDURE — 93000 ELECTROCARDIOGRAM COMPLETE: CPT | Performed by: INTERNAL MEDICINE

## 2020-09-25 PROCEDURE — 99215 OFFICE O/P EST HI 40 MIN: CPT | Performed by: INTERNAL MEDICINE

## 2020-09-25 ASSESSMENT — FIBROSIS 4 INDEX: FIB4 SCORE: 2

## 2020-09-25 ASSESSMENT — ENCOUNTER SYMPTOMS
VOMITING: 0
DECREASED APPETITE: 0
EXCESSIVE DAYTIME SLEEPINESS: 0
BACK PAIN: 0
NIGHT SWEATS: 0
WHEEZING: 0
FALLS: 0
FEVER: 0
BLOATING: 0
ORTHOPNEA: 0
SHORTNESS OF BREATH: 0
SORE THROAT: 0
IRREGULAR HEARTBEAT: 0
CONSTIPATION: 0
HEADACHES: 0
DIAPHORESIS: 0
WEAKNESS: 0
LIGHT-HEADEDNESS: 0
DOUBLE VISION: 0
PARESTHESIAS: 0
NAUSEA: 0
COUGH: 0
NEAR-SYNCOPE: 0
DIZZINESS: 0
DIARRHEA: 0
LOSS OF BALANCE: 0
PND: 0
NUMBNESS: 0
MYALGIAS: 0
PALPITATIONS: 0
BLURRED VISION: 0
SLEEP DISTURBANCES DUE TO BREATHING: 0
SYNCOPE: 0
DYSPNEA ON EXERTION: 0

## 2020-09-25 NOTE — PROGRESS NOTES
Cardiology Initial Consultation Note    Date of note:    9/25/2020    Primary Care Provider: Ria Rollins M.D.  Referring Provider: Gabbie Ahuja A.P.R.*     Patient Name: Erasto Calhoun   YOB: 1940  MRN:              [id number]    Chief Complaint   Patient presents with   • New Patient     Late effect of stroke & Atrial Tachycardia per referral        History of Present Illness: Mr. Erasto Calhoun is a 80 y.o. male whose current medical problems include hyperlipidemia, recent stroke (6/2020) who is here for cardiac consultation for short runs of atrial tachycardia noted on the ZIO patch monitor.    Patient was admitted at [facility] in June 2020 and was found to have a small sized acute infarct in the left posterior parasagittal frontal cortex.  He was started on dual antiplatelet therapy with aspirin, clopidogrel (was discontinued after 45 days) and Lipitor 80 mg daily.  Echocardiogram performed showed normal ejection fraction of 60%, mild concentric LVH with no significant valvular heart disease.  Telemetry monitoring showed sinus rhythm without any atrial arrhythmias.  Outpatient he was placed on 14-day ZIO patch monitor which did not show atrial fibrillation or flutter however showed nonsustained short lasting episodes of atrial tachycardia.    Patient reports no symptoms of palpitations or skipped beats.  Overall states feeling quite well without any concerning symptoms.  He currently lives alone with his daughter living 1 block away.    In regards to elevated blood pressure reading in the office today, patient reports average blood pressure 110-120s/70s at home with history of whitecoat hypertension.    Cardiovascular Risk Factors:  1. Smoking status: Former smoker, quit 1962  2. Type II Diabetes Mellitus: No  Lab Results   Component Value Date/Time    HBA1C 5.1 06/15/2020 05:15 AM     3. Hypertension: Denies  4. Dyslipidemia:    Cholesterol,Tot   Date Value Ref Range Status    06/15/2020 151 100 - 199 mg/dL Final     LDL   Date Value Ref Range Status   06/15/2020 86 <100 mg/dL Final     HDL   Date Value Ref Range Status   06/15/2020 32 (A) >=40 mg/dL Final     Triglycerides   Date Value Ref Range Status   06/15/2020 163 (H) 0 - 149 mg/dL Final     5. Family history of early Coronary Artery Disease in a first degree relative (Male less than 55 years of age; Female less than 65 years of age): Father suffered a heart attack at the age of 55  6.  Obesity and/or Metabolic Syndrome: No  7. Sedentary lifestyle: No    Review of Systems   Constitution: Negative for decreased appetite, diaphoresis, fever, malaise/fatigue and night sweats.   HENT: Negative for congestion and sore throat.    Eyes: Negative for blurred vision and double vision.   Cardiovascular: Negative for chest pain, cyanosis, dyspnea on exertion, irregular heartbeat, leg swelling, near-syncope, orthopnea, palpitations, paroxysmal nocturnal dyspnea and syncope.   Respiratory: Negative for cough, shortness of breath, sleep disturbances due to breathing and wheezing.    Endocrine: Negative for cold intolerance and heat intolerance.   Musculoskeletal: Negative for back pain, falls and myalgias.   Gastrointestinal: Negative for bloating, constipation, diarrhea, nausea and vomiting.   Neurological: Negative for excessive daytime sleepiness, dizziness, headaches, light-headedness, loss of balance, numbness, paresthesias and weakness.         Past Medical History:   Diagnosis Date   • Cataract 01/22/2020    bilateral   • Dental disorder 01/22/2020    upper and lower dentures   • High cholesterol 01/22/2020    on med   • Hyperlipidemia 1/9/2017    Pravastin x 10 yrs    • Lupus (systemic lupus erythematosus) (HCC)     Pt reports only symptom was rash, on plaquenil for several yrs, stopped 2004         Past Surgical History:   Procedure Laterality Date   • CATARACT PHACO WITH IOL Left 2/11/2020    Procedure: PHACOEMULSIFICATION, CATARACT,  WITH IOL INSERTION;  Surgeon: Kiran Jang M.D.;  Location: SURGERY SAME DAY [facility];  Service: Ophthalmology   • CATARACT PHACO WITH IOL Right 1/28/2020    Procedure: PHACOEMULSIFICATION, CATARACT, WITH IOL INSERTION;  Surgeon: Kiran Jang M.D.;  Location: SURGERY SAME DAY [facility];  Service: Ophthalmology   • OTHER SURGICAL PROCEDURE Right 1972    right heel injury   • CAST APPLICATION      climbing, collar bone injury (7th grade)   • OPEN REDUCTION           Current Outpatient Medications   Medication Sig Dispense Refill   • cod liver oil Cap Take 1 Cap by mouth every day.     • atorvastatin (LIPITOR) 80 MG tablet Take 1 Tab by mouth every evening. 100 Tab 2   • aspirin 81 MG EC tablet Take 1 Tab by mouth every day. 100 Tab 2   • CALCIUM PO Take  by mouth.     • ascorbic acid (ASCORBIC ACID) 500 MG Tab Take 500 mg by mouth every morning.     • multivitamin (THERAGRAN) Tab Take 1 Tab by mouth every morning.       No current facility-administered medications for this visit.          No Known Allergies      Family History   Problem Relation Age of Onset   • Cancer Mother         skin cancer   • Stroke Mother 92   • Diabetes Mother    • Hypertension Father    • Heart Disease Father 55        father passed from heart attack   • No Known Problems Brother    • No Known Problems Brother    • Cancer Brother         throat ca   • Alcohol/Drug Brother         heavy drinker         Social History     Socioeconomic History   • Marital status:      Spouse name: Not on file   • Number of children: Not on file   • Years of education: Not on file   • Highest education level: Not on file   Occupational History   • Not on file   Social Needs   • Financial resource strain: Not on file   • Food insecurity     Worry: Not on file     Inability: Not on file   • Transportation needs     Medical: No     Non-medical: No   Tobacco Use   • Smoking status: Former Smoker     Packs/day: 1.00     Years: 4.00     Pack  years: 4.00     Types: Cigarettes     Quit date: 1962     Years since quittin.7   • Smokeless tobacco: Never Used   • Tobacco comment: quit    Substance and Sexual Activity   • Alcohol use: Yes     Alcohol/week: 0.6 oz     Types: 1 Shots of liquor per week     Comment: 1 per week   • Drug use: No   • Sexual activity: Not Currently     Partners: Female   Lifestyle   • Physical activity     Days per week: Not on file     Minutes per session: Not on file   • Stress: Not on file   Relationships   • Social connections     Talks on phone: Not on file     Gets together: Not on file     Attends Jainism service: Not on file     Active member of club or organization: Not on file     Attends meetings of clubs or organizations: Not on file     Relationship status: Not on file   • Intimate partner violence     Fear of current or ex partner: Not on file     Emotionally abused: Not on file     Physically abused: Not on file     Forced sexual activity: Not on file   Other Topics Concern   • Not on file   Social History Narrative   • Not on file         Physical Exam:  Ambulatory Vitals  /80 (BP Location: Left arm, Patient Position: Sitting, BP Cuff Size: Adult)   Pulse 60   Resp 18   Ht 1.829 m (6')   Wt 73 kg (161 lb)   SpO2 98%    Oxygen Therapy:  Pulse Oximetry: 98 %  BP Readings from Last 4 Encounters:   20 152/80   20 128/70   20 132/72   20 140/60       Weight/BMI: Body mass index is 21.84 kg/m².  Wt Readings from Last 4 Encounters:   20 73 kg (161 lb)   20 73.1 kg (161 lb 2.5 oz)   20 72.1 kg (159 lb)   20 72.1 kg (159 lb)         General: Well appearing and in no apparent distress  Eyes: nl conjunctiva, no icteric sclera  ENT: wearing a mask, normal external appearance of ears  Neck: no visible JVP,  no carotid bruits  Lungs: normal respiratory effort, CTAB  Heart: RRR, no murmurs, no rubs or gallops,  no edema bilateral lower extremities. No LV/RV  heave on cardiac palpatation. + bilateral radial pulses.  + bilateral dp pulses.   Abdomen: soft, non tender, non distended, no masses, normal bowel sounds.  No HSM.  Extremities/MSK: no clubbing, no cyanosis  Neurological: No focal sensory deficits  Psychiatric: Appropriate affect, A/O x 3, intact judgement and insight  Skin: Warm extremities      Lab Data Review:  Lab Results   Component Value Date/Time    CHOLSTRLTOT 151 06/15/2020 05:15 AM    LDL 86 06/15/2020 05:15 AM    HDL 32 (A) 06/15/2020 05:15 AM    TRIGLYCERIDE 163 (H) 06/15/2020 05:15 AM       Lab Results   Component Value Date/Time    SODIUM 138 06/15/2020 05:15 AM    POTASSIUM 3.8 06/15/2020 05:15 AM    CHLORIDE 107 06/15/2020 05:15 AM    CO2 22 06/15/2020 05:15 AM    GLUCOSE 111 (H) 06/15/2020 05:15 AM    BUN 25 (H) 06/15/2020 05:15 AM    CREATININE 0.97 06/15/2020 05:15 AM     Lab Results   Component Value Date/Time    ALKPHOSPHAT 78 09/23/2020 09:32 AM    ASTSGOT 30 09/23/2020 09:32 AM    ALTSGPT 30 09/23/2020 09:32 AM    TBILIRUBIN 1.5 09/23/2020 09:32 AM      Lab Results   Component Value Date/Time    WBC 6.8 06/15/2020 05:15 AM     Lab Results   Component Value Date/Time    HBA1C 5.1 06/15/2020 05:15 AM         Cardiac Imaging and Procedures Review:    EKG dated 9/25/2020: Reviewed personally, shows sinus arrhythmia with heart rate 56 bpm, otherwise normal ECG.    Echo dated 6/14/2020:   CONCLUSIONS  No prior study is available for comparison.   Left ventricular ejection fraction is visually estimated to be 60%.  Mild concentric left ventricular hypertrophy.  Aortic sclerosis without stenosis.  Mild aortic insufficiency.  Unable to estimate RVSP/RAP.    ZIO Patch 14-day Holter Monitor (8/3/2020):   1.  Normal sinus rhythm, average heart rate 74 bpm.   2.  Rare premature ventricular complexes.   3.  Rare premature atrial complexes.   4.  Rare episodes of paroxysmal atrial tachycardia longest 15 beats 131 bpm.,  Asymptomatic.   5.  No episodes of  atrial fibrillation or atrial flutter.   6.  No patient symptoms recorded.       Radiology test Review:  CXR 6/14/2020: IMPRESSION:  No acute cardiopulmonary process is seen.  Atherosclerotic plaque.       Assessment & Plan     1. Atrial tachycardia (HCC)  EKG   2. Elevated blood pressure reading     3. Cerebrovascular accident (CVA), unspecified mechanism (HCC)           Shared Medical Decision Making:  Patient denies any palpitations, shortness of breath, lightheadedness or dizziness while he was wearing the ZIO patch monitor.  Atrial tachycardia episodes are nonsustained with longest episode being only 15 beats.  Had an extensive discussion with the patient regarding options of either doing a 30-day event monitor or loop recorder implantation for long-term monitoring.    Patient is not interested in doing a 30-day event monitor.  In regards to loop recorder, discussed risks and benefits of performing the procedure and the importance of doing long-term monitoring to prevent future strokes if the underlying cause is atrial arrhythmia.  After extensive discussion, patient wishes to think about undergoing loop recorder implantation.  I have provided him with my contact information and he will reach back to our office if he decides to proceed with it.    Encouraged to continue checking his blood pressure at home on a daily basis with goal blood pressure less than 130/80.     All of patient's excellent questions were answered to the best of my knowledge and to his satisfaction.  It was a pleasure seeing Mr. Erasto Calhoun in my clinic today. Return in about 1 year (around 9/25/2021). Patient is aware to call the cardiology clinic with any questions or concerns.      Arnaldo Barillas MD  [facility]  [facility], [address]  [address] [address]  Phone: [phone]  Fax: [phone]

## 2020-11-16 ENCOUNTER — OFFICE VISIT (OUTPATIENT)
Dept: MEDICAL GROUP | Facility: PHYSICIAN GROUP | Age: 80
End: 2020-11-16
Payer: MEDICARE

## 2020-11-16 VITALS
HEART RATE: 68 BPM | OXYGEN SATURATION: 96 % | HEIGHT: 72 IN | BODY MASS INDEX: 22.53 KG/M2 | TEMPERATURE: 98.4 F | SYSTOLIC BLOOD PRESSURE: 126 MMHG | WEIGHT: 166.37 LBS | DIASTOLIC BLOOD PRESSURE: 78 MMHG

## 2020-11-16 DIAGNOSIS — I77.810 ASCENDING AORTA DILATATION (HCC): ICD-10-CM

## 2020-11-16 DIAGNOSIS — H61.22 IMPACTED CERUMEN OF LEFT EAR: ICD-10-CM

## 2020-11-16 PROCEDURE — 99214 OFFICE O/P EST MOD 30 MIN: CPT | Performed by: INTERNAL MEDICINE

## 2020-11-16 ASSESSMENT — FIBROSIS 4 INDEX: FIB4 SCORE: 2

## 2020-11-27 ENCOUNTER — HOSPITAL ENCOUNTER (INPATIENT)
Facility: MEDICAL CENTER | Age: 80
LOS: 3 days | DRG: 522 | End: 2020-12-01
Attending: EMERGENCY MEDICINE | Admitting: STUDENT IN AN ORGANIZED HEALTH CARE EDUCATION/TRAINING PROGRAM
Payer: MEDICARE

## 2020-11-27 ENCOUNTER — APPOINTMENT (OUTPATIENT)
Dept: RADIOLOGY | Facility: MEDICAL CENTER | Age: 80
DRG: 522 | End: 2020-11-27
Attending: EMERGENCY MEDICINE
Payer: MEDICARE

## 2020-11-27 DIAGNOSIS — S51.011A SKIN TEAR OF RIGHT ELBOW WITHOUT COMPLICATION, INITIAL ENCOUNTER: ICD-10-CM

## 2020-11-27 DIAGNOSIS — S72.001A CLOSED FRACTURE OF NECK OF RIGHT FEMUR, INITIAL ENCOUNTER (HCC): ICD-10-CM

## 2020-11-27 DIAGNOSIS — Z86.73 HISTORY OF CVA (CEREBROVASCULAR ACCIDENT): ICD-10-CM

## 2020-11-27 DIAGNOSIS — S72.001B: ICD-10-CM
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
## 2020-11-27 LAB
ALBUMIN SERPL BCP-MCNC: 3.8 G/DL (ref 3.2–4.9)
ALBUMIN/GLOB SERPL: 1.5 G/DL
ALP SERPL-CCNC: 73 U/L (ref 30–99)
ALT SERPL-CCNC: 29 U/L (ref 2–50)
ANION GAP SERPL CALC-SCNC: 8 MMOL/L (ref 7–16)
APTT PPP: 26.6 SEC (ref 24.7–36)
AST SERPL-CCNC: 30 U/L (ref 12–45)
BASOPHILS # BLD AUTO: 0.4 % (ref 0–1.8)
BASOPHILS # BLD: 0.04 K/UL (ref 0–0.12)
BILIRUB SERPL-MCNC: 1.4 MG/DL (ref 0.1–1.5)
BUN SERPL-MCNC: 31 MG/DL (ref 8–22)
CALCIUM SERPL-MCNC: 9.1 MG/DL (ref 8.5–10.5)
CHLORIDE SERPL-SCNC: 108 MMOL/L (ref 96–112)
CO2 SERPL-SCNC: 22 MMOL/L (ref 20–33)
CREAT SERPL-MCNC: 1.1 MG/DL (ref 0.5–1.4)
EOSINOPHIL # BLD AUTO: 0.09 K/UL (ref 0–0.51)
EOSINOPHIL NFR BLD: 0.9 % (ref 0–6.9)
ERYTHROCYTE [DISTWIDTH] IN BLOOD BY AUTOMATED COUNT: 51.6 FL (ref 35.9–50)
GLOBULIN SER CALC-MCNC: 2.5 G/DL (ref 1.9–3.5)
GLUCOSE SERPL-MCNC: 118 MG/DL (ref 65–99)
HCT VFR BLD AUTO: 38.4 % (ref 42–52)
HGB BLD-MCNC: 12.6 G/DL (ref 14–18)
IMM GRANULOCYTES # BLD AUTO: 0.02 K/UL (ref 0–0.11)
IMM GRANULOCYTES NFR BLD AUTO: 0.2 % (ref 0–0.9)
INR PPP: 1.08 (ref 0.87–1.13)
LYMPHOCYTES # BLD AUTO: 3.87 K/UL (ref 1–4.8)
LYMPHOCYTES NFR BLD: 40.1 % (ref 22–41)
MCH RBC QN AUTO: 33.2 PG (ref 27–33)
MCHC RBC AUTO-ENTMCNC: 32.8 G/DL (ref 33.7–35.3)
MCV RBC AUTO: 101.1 FL (ref 81.4–97.8)
MONOCYTES # BLD AUTO: 1.08 K/UL (ref 0–0.85)
MONOCYTES NFR BLD AUTO: 11.2 % (ref 0–13.4)
NEUTROPHILS # BLD AUTO: 4.54 K/UL (ref 1.82–7.42)
NEUTROPHILS NFR BLD: 47.2 % (ref 44–72)
NRBC # BLD AUTO: 0 K/UL
NRBC BLD-RTO: 0 /100 WBC
PLATELET # BLD AUTO: 195 K/UL (ref 164–446)
PMV BLD AUTO: 10.6 FL (ref 9–12.9)
POTASSIUM SERPL-SCNC: 4.4 MMOL/L (ref 3.6–5.5)
PROT SERPL-MCNC: 6.3 G/DL (ref 6–8.2)
PROTHROMBIN TIME: 14.3 SEC (ref 12–14.6)
RBC # BLD AUTO: 3.8 M/UL (ref 4.7–6.1)
SODIUM SERPL-SCNC: 138 MMOL/L (ref 135–145)
WBC # BLD AUTO: 9.6 K/UL (ref 4.8–10.8)

## 2020-11-27 PROCEDURE — 85610 PROTHROMBIN TIME: CPT

## 2020-11-27 PROCEDURE — 93005 ELECTROCARDIOGRAM TRACING: CPT | Performed by: EMERGENCY MEDICINE

## 2020-11-27 PROCEDURE — 96374 THER/PROPH/DIAG INJ IV PUSH: CPT

## 2020-11-27 PROCEDURE — 99285 EMERGENCY DEPT VISIT HI MDM: CPT

## 2020-11-27 PROCEDURE — 99215 OFFICE O/P EST HI 40 MIN: CPT | Performed by: STUDENT IN AN ORGANIZED HEALTH CARE EDUCATION/TRAINING PROGRAM

## 2020-11-27 PROCEDURE — 73552 X-RAY EXAM OF FEMUR 2/>: CPT | Mod: RT

## 2020-11-27 PROCEDURE — 80053 COMPREHEN METABOLIC PANEL: CPT

## 2020-11-27 PROCEDURE — 85025 COMPLETE CBC W/AUTO DIFF WBC: CPT

## 2020-11-27 PROCEDURE — 700111 HCHG RX REV CODE 636 W/ 250 OVERRIDE (IP): Performed by: EMERGENCY MEDICINE

## 2020-11-27 PROCEDURE — 85730 THROMBOPLASTIN TIME PARTIAL: CPT

## 2020-11-27 PROCEDURE — 71045 X-RAY EXAM CHEST 1 VIEW: CPT

## 2020-11-27 PROCEDURE — 72170 X-RAY EXAM OF PELVIS: CPT

## 2020-11-27 RX ORDER — ONDANSETRON 2 MG/ML
4 INJECTION INTRAMUSCULAR; INTRAVENOUS ONCE
Status: COMPLETED | OUTPATIENT
Start: 2020-11-27 | End: 2020-11-28

## 2020-11-27 RX ORDER — MORPHINE SULFATE 4 MG/ML
4 INJECTION, SOLUTION INTRAMUSCULAR; INTRAVENOUS ONCE
Status: COMPLETED | OUTPATIENT
Start: 2020-11-27 | End: 2020-11-27

## 2020-11-27 RX ADMIN — MORPHINE SULFATE 4 MG: 4 INJECTION INTRAVENOUS at 22:51

## 2020-11-27 ASSESSMENT — FIBROSIS 4 INDEX: FIB4 SCORE: 2

## 2020-11-27 ASSESSMENT — PAIN DESCRIPTION - DESCRIPTORS: DESCRIPTORS: DULL

## 2020-11-28 ENCOUNTER — APPOINTMENT (OUTPATIENT)
Dept: RADIOLOGY | Facility: MEDICAL CENTER | Age: 80
DRG: 522 | End: 2020-11-28
Attending: ORTHOPAEDIC SURGERY
Payer: MEDICARE

## 2020-11-28 ENCOUNTER — ANESTHESIA EVENT (OUTPATIENT)
Dept: SURGERY | Facility: MEDICAL CENTER | Age: 80
DRG: 522 | End: 2020-11-28
Payer: MEDICARE

## 2020-11-28 ENCOUNTER — ANESTHESIA (OUTPATIENT)
Dept: SURGERY | Facility: MEDICAL CENTER | Age: 80
DRG: 522 | End: 2020-11-28
Payer: MEDICARE

## 2020-11-28 PROBLEM — S72.009A FEMORAL NECK FRACTURE (HCC): Status: ACTIVE | Noted: 2020-11-28

## 2020-11-28 LAB
ANION GAP SERPL CALC-SCNC: 7 MMOL/L (ref 7–16)
BASOPHILS # BLD AUTO: 0.2 % (ref 0–1.8)
BASOPHILS # BLD: 0.03 K/UL (ref 0–0.12)
BUN SERPL-MCNC: 27 MG/DL (ref 8–22)
CALCIUM SERPL-MCNC: 8.8 MG/DL (ref 8.5–10.5)
CHLORIDE SERPL-SCNC: 104 MMOL/L (ref 96–112)
CHOLEST SERPL-MCNC: 122 MG/DL (ref 100–199)
CO2 SERPL-SCNC: 23 MMOL/L (ref 20–33)
COVID ORDER STATUS COVID19: NORMAL
CREAT SERPL-MCNC: 0.87 MG/DL (ref 0.5–1.4)
EKG IMPRESSION: NORMAL
EKG IMPRESSION: NORMAL
EOSINOPHIL # BLD AUTO: 0.01 K/UL (ref 0–0.51)
EOSINOPHIL NFR BLD: 0.1 % (ref 0–6.9)
ERYTHROCYTE [DISTWIDTH] IN BLOOD BY AUTOMATED COUNT: 55 FL (ref 35.9–50)
FLUAV RNA SPEC QL NAA+PROBE: NEGATIVE
FLUBV RNA SPEC QL NAA+PROBE: NEGATIVE
GLUCOSE SERPL-MCNC: 132 MG/DL (ref 65–99)
HCT VFR BLD AUTO: 40.3 % (ref 42–52)
HDLC SERPL-MCNC: 45 MG/DL
HGB BLD-MCNC: 12.3 G/DL (ref 14–18)
IMM GRANULOCYTES # BLD AUTO: 0.06 K/UL (ref 0–0.11)
IMM GRANULOCYTES NFR BLD AUTO: 0.4 % (ref 0–0.9)
LDLC SERPL CALC-MCNC: 60 MG/DL
LYMPHOCYTES # BLD AUTO: 3.76 K/UL (ref 1–4.8)
LYMPHOCYTES NFR BLD: 28.2 % (ref 22–41)
MCH RBC QN AUTO: 32.3 PG (ref 27–33)
MCHC RBC AUTO-ENTMCNC: 30.5 G/DL (ref 33.7–35.3)
MCV RBC AUTO: 105.8 FL (ref 81.4–97.8)
MONOCYTES # BLD AUTO: 1.51 K/UL (ref 0–0.85)
MONOCYTES NFR BLD AUTO: 11.3 % (ref 0–13.4)
NEUTROPHILS # BLD AUTO: 7.98 K/UL (ref 1.82–7.42)
NEUTROPHILS NFR BLD: 59.8 % (ref 44–72)
NRBC # BLD AUTO: 0 K/UL
NRBC BLD-RTO: 0 /100 WBC
PLATELET # BLD AUTO: 204 K/UL (ref 164–446)
PMV BLD AUTO: 11.1 FL (ref 9–12.9)
POTASSIUM SERPL-SCNC: 3.9 MMOL/L (ref 3.6–5.5)
RBC # BLD AUTO: 3.81 M/UL (ref 4.7–6.1)
RSV RNA SPEC QL NAA+PROBE: NEGATIVE
SARS-COV-2 RNA RESP QL NAA+PROBE: NOTDETECTED
SODIUM SERPL-SCNC: 134 MMOL/L (ref 135–145)
SPECIMEN SOURCE: NORMAL
TRIGL SERPL-MCNC: 86 MG/DL (ref 0–149)
WBC # BLD AUTO: 13.4 K/UL (ref 4.8–10.8)

## 2020-11-28 PROCEDURE — A9270 NON-COVERED ITEM OR SERVICE: HCPCS | Performed by: STUDENT IN AN ORGANIZED HEALTH CARE EDUCATION/TRAINING PROGRAM

## 2020-11-28 PROCEDURE — 700102 HCHG RX REV CODE 250 W/ 637 OVERRIDE(OP): Performed by: STUDENT IN AN ORGANIZED HEALTH CARE EDUCATION/TRAINING PROGRAM

## 2020-11-28 PROCEDURE — 502000 HCHG MISC OR IMPLANTS RC 0278: Performed by: ORTHOPAEDIC SURGERY

## 2020-11-28 PROCEDURE — A9270 NON-COVERED ITEM OR SERVICE: HCPCS | Performed by: ANESTHESIOLOGY

## 2020-11-28 PROCEDURE — 700102 HCHG RX REV CODE 250 W/ 637 OVERRIDE(OP): Performed by: ANESTHESIOLOGY

## 2020-11-28 PROCEDURE — 700111 HCHG RX REV CODE 636 W/ 250 OVERRIDE (IP): Performed by: ANESTHESIOLOGY

## 2020-11-28 PROCEDURE — 0241U HCHG SARS-COV-2 COVID-19 NFCT DS RESP RNA 4 TRGT MIC: CPT

## 2020-11-28 PROCEDURE — A9270 NON-COVERED ITEM OR SERVICE: HCPCS | Performed by: ORTHOPAEDIC SURGERY

## 2020-11-28 PROCEDURE — 72170 X-RAY EXAM OF PELVIS: CPT

## 2020-11-28 PROCEDURE — 700102 HCHG RX REV CODE 250 W/ 637 OVERRIDE(OP): Performed by: ORTHOPAEDIC SURGERY

## 2020-11-28 PROCEDURE — 96375 TX/PRO/DX INJ NEW DRUG ADDON: CPT

## 2020-11-28 PROCEDURE — 700101 HCHG RX REV CODE 250: Performed by: ANESTHESIOLOGY

## 2020-11-28 PROCEDURE — 160035 HCHG PACU - 1ST 60 MINS PHASE I: Performed by: ORTHOPAEDIC SURGERY

## 2020-11-28 PROCEDURE — 500367 HCHG DRAIN KIT, HEMOVAC: Performed by: ORTHOPAEDIC SURGERY

## 2020-11-28 PROCEDURE — 80048 BASIC METABOLIC PNL TOTAL CA: CPT

## 2020-11-28 PROCEDURE — 96376 TX/PRO/DX INJ SAME DRUG ADON: CPT

## 2020-11-28 PROCEDURE — 0SRR0JZ REPLACEMENT OF RIGHT HIP JOINT, FEMORAL SURFACE WITH SYNTHETIC SUBSTITUTE, OPEN APPROACH: ICD-10-PCS | Performed by: ORTHOPAEDIC SURGERY

## 2020-11-28 PROCEDURE — 700105 HCHG RX REV CODE 258: Performed by: STUDENT IN AN ORGANIZED HEALTH CARE EDUCATION/TRAINING PROGRAM

## 2020-11-28 PROCEDURE — 770006 HCHG ROOM/CARE - MED/SURG/GYN SEMI*

## 2020-11-28 PROCEDURE — 80061 LIPID PANEL: CPT

## 2020-11-28 PROCEDURE — 700111 HCHG RX REV CODE 636 W/ 250 OVERRIDE (IP): Performed by: EMERGENCY MEDICINE

## 2020-11-28 PROCEDURE — 160031 HCHG SURGERY MINUTES - 1ST 30 MINS LEVEL 5: Performed by: ORTHOPAEDIC SURGERY

## 2020-11-28 PROCEDURE — C1776 JOINT DEVICE (IMPLANTABLE): HCPCS | Performed by: ORTHOPAEDIC SURGERY

## 2020-11-28 PROCEDURE — 160042 HCHG SURGERY MINUTES - EA ADDL 1 MIN LEVEL 5: Performed by: ORTHOPAEDIC SURGERY

## 2020-11-28 PROCEDURE — 501838 HCHG SUTURE GENERAL: Performed by: ORTHOPAEDIC SURGERY

## 2020-11-28 PROCEDURE — 93005 ELECTROCARDIOGRAM TRACING: CPT | Performed by: STUDENT IN AN ORGANIZED HEALTH CARE EDUCATION/TRAINING PROGRAM

## 2020-11-28 PROCEDURE — 160002 HCHG RECOVERY MINUTES (STAT): Performed by: ORTHOPAEDIC SURGERY

## 2020-11-28 PROCEDURE — 700111 HCHG RX REV CODE 636 W/ 250 OVERRIDE (IP): Performed by: STUDENT IN AN ORGANIZED HEALTH CARE EDUCATION/TRAINING PROGRAM

## 2020-11-28 PROCEDURE — 85025 COMPLETE CBC W/AUTO DIFF WBC: CPT

## 2020-11-28 PROCEDURE — 160048 HCHG OR STATISTICAL LEVEL 1-5: Performed by: ORTHOPAEDIC SURGERY

## 2020-11-28 PROCEDURE — 160009 HCHG ANES TIME/MIN: Performed by: ORTHOPAEDIC SURGERY

## 2020-11-28 PROCEDURE — 99232 SBSQ HOSP IP/OBS MODERATE 35: CPT | Performed by: STUDENT IN AN ORGANIZED HEALTH CARE EDUCATION/TRAINING PROGRAM

## 2020-11-28 PROCEDURE — 36415 COLL VENOUS BLD VENIPUNCTURE: CPT

## 2020-11-28 PROCEDURE — 502578 HCHG PACK, TOTAL HIP: Performed by: ORTHOPAEDIC SURGERY

## 2020-11-28 PROCEDURE — 700105 HCHG RX REV CODE 258: Performed by: ANESTHESIOLOGY

## 2020-11-28 PROCEDURE — 160036 HCHG PACU - EA ADDL 30 MINS PHASE I: Performed by: ORTHOPAEDIC SURGERY

## 2020-11-28 PROCEDURE — 700111 HCHG RX REV CODE 636 W/ 250 OVERRIDE (IP): Performed by: ORTHOPAEDIC SURGERY

## 2020-11-28 DEVICE — IMPLANT OXINIUM FEM HD 12/14 28MM +4 (1EA): Type: IMPLANTABLE DEVICE | Site: HIP | Status: FUNCTIONAL

## 2020-11-28 DEVICE — IMPLANTABLE DEVICE: Type: IMPLANTABLE DEVICE | Site: HIP | Status: FUNCTIONAL

## 2020-11-28 DEVICE — IMPLANT TANDEM BIPOLAR COCR 51OD 28ID: Type: IMPLANTABLE DEVICE | Site: HIP | Status: FUNCTIONAL

## 2020-11-28 RX ORDER — HYDROMORPHONE HYDROCHLORIDE 1 MG/ML
1 INJECTION, SOLUTION INTRAMUSCULAR; INTRAVENOUS; SUBCUTANEOUS EVERY 4 HOURS PRN
Status: DISCONTINUED | OUTPATIENT
Start: 2020-11-28 | End: 2020-12-01 | Stop reason: HOSPADM

## 2020-11-28 RX ORDER — MEPERIDINE HYDROCHLORIDE 25 MG/ML
INJECTION INTRAMUSCULAR; INTRAVENOUS; SUBCUTANEOUS PRN
Status: DISCONTINUED | OUTPATIENT
Start: 2020-11-28 | End: 2020-11-28 | Stop reason: SURG

## 2020-11-28 RX ORDER — BISACODYL 10 MG
10 SUPPOSITORY, RECTAL RECTAL
Status: DISCONTINUED | OUTPATIENT
Start: 2020-11-28 | End: 2020-12-01 | Stop reason: HOSPADM

## 2020-11-28 RX ORDER — MEPERIDINE HYDROCHLORIDE 25 MG/ML
25 INJECTION INTRAMUSCULAR; INTRAVENOUS; SUBCUTANEOUS
Status: DISCONTINUED | OUTPATIENT
Start: 2020-11-28 | End: 2020-11-28 | Stop reason: HOSPADM

## 2020-11-28 RX ORDER — DIPHENHYDRAMINE HYDROCHLORIDE 50 MG/ML
12.5 INJECTION INTRAMUSCULAR; INTRAVENOUS
Status: DISCONTINUED | OUTPATIENT
Start: 2020-11-28 | End: 2020-11-28 | Stop reason: HOSPADM

## 2020-11-28 RX ORDER — GLYCOPYRROLATE 0.2 MG/ML
INJECTION INTRAMUSCULAR; INTRAVENOUS PRN
Status: DISCONTINUED | OUTPATIENT
Start: 2020-11-28 | End: 2020-11-28 | Stop reason: SURG

## 2020-11-28 RX ORDER — POLYETHYLENE GLYCOL 3350 17 G/17G
1 POWDER, FOR SOLUTION ORAL
Status: DISCONTINUED | OUTPATIENT
Start: 2020-11-28 | End: 2020-11-28

## 2020-11-28 RX ORDER — HALOPERIDOL 5 MG/ML
1 INJECTION INTRAMUSCULAR
Status: DISCONTINUED | OUTPATIENT
Start: 2020-11-28 | End: 2020-11-28 | Stop reason: HOSPADM

## 2020-11-28 RX ORDER — ONDANSETRON 2 MG/ML
INJECTION INTRAMUSCULAR; INTRAVENOUS PRN
Status: DISCONTINUED | OUTPATIENT
Start: 2020-11-28 | End: 2020-11-28 | Stop reason: SURG

## 2020-11-28 RX ORDER — CEFAZOLIN SODIUM 1 G/50ML
1 INJECTION, SOLUTION INTRAVENOUS EVERY 8 HOURS
Status: COMPLETED | OUTPATIENT
Start: 2020-11-28 | End: 2020-11-29

## 2020-11-28 RX ORDER — ROCURONIUM BROMIDE 10 MG/ML
INJECTION, SOLUTION INTRAVENOUS PRN
Status: DISCONTINUED | OUTPATIENT
Start: 2020-11-28 | End: 2020-11-28 | Stop reason: SURG

## 2020-11-28 RX ORDER — SODIUM CHLORIDE, SODIUM LACTATE, POTASSIUM CHLORIDE, CALCIUM CHLORIDE 600; 310; 30; 20 MG/100ML; MG/100ML; MG/100ML; MG/100ML
INJECTION, SOLUTION INTRAVENOUS
Status: DISCONTINUED | OUTPATIENT
Start: 2020-11-28 | End: 2020-11-28 | Stop reason: SURG

## 2020-11-28 RX ORDER — CEFAZOLIN SODIUM 1 G/3ML
INJECTION, POWDER, FOR SOLUTION INTRAMUSCULAR; INTRAVENOUS PRN
Status: DISCONTINUED | OUTPATIENT
Start: 2020-11-28 | End: 2020-11-28 | Stop reason: SURG

## 2020-11-28 RX ORDER — SODIUM CHLORIDE, SODIUM LACTATE, POTASSIUM CHLORIDE, CALCIUM CHLORIDE 600; 310; 30; 20 MG/100ML; MG/100ML; MG/100ML; MG/100ML
INJECTION, SOLUTION INTRAVENOUS CONTINUOUS
Status: DISCONTINUED | OUTPATIENT
Start: 2020-11-28 | End: 2020-11-28 | Stop reason: HOSPADM

## 2020-11-28 RX ORDER — HYDROMORPHONE HYDROCHLORIDE 10 MG/ML
1 INJECTION INTRAMUSCULAR; INTRAVENOUS; SUBCUTANEOUS EVERY 4 HOURS PRN
Status: DISCONTINUED | OUTPATIENT
Start: 2020-11-28 | End: 2020-11-28

## 2020-11-28 RX ORDER — HEPARIN SODIUM 5000 [USP'U]/ML
5000 INJECTION, SOLUTION INTRAVENOUS; SUBCUTANEOUS EVERY 8 HOURS
Status: DISCONTINUED | OUTPATIENT
Start: 2020-11-28 | End: 2020-11-28

## 2020-11-28 RX ORDER — ACETAMINOPHEN 325 MG/1
650 TABLET ORAL EVERY 4 HOURS PRN
Status: DISCONTINUED | OUTPATIENT
Start: 2020-11-28 | End: 2020-12-01 | Stop reason: HOSPADM

## 2020-11-28 RX ORDER — MORPHINE SULFATE 4 MG/ML
4 INJECTION, SOLUTION INTRAMUSCULAR; INTRAVENOUS ONCE
Status: COMPLETED | OUTPATIENT
Start: 2020-11-28 | End: 2020-11-28

## 2020-11-28 RX ORDER — MIDAZOLAM HYDROCHLORIDE 1 MG/ML
1 INJECTION INTRAMUSCULAR; INTRAVENOUS
Status: DISCONTINUED | OUTPATIENT
Start: 2020-11-28 | End: 2020-11-28 | Stop reason: HOSPADM

## 2020-11-28 RX ORDER — ONDANSETRON 2 MG/ML
4 INJECTION INTRAMUSCULAR; INTRAVENOUS
Status: DISCONTINUED | OUTPATIENT
Start: 2020-11-28 | End: 2020-11-28 | Stop reason: HOSPADM

## 2020-11-28 RX ORDER — TRANEXAMIC ACID 100 MG/ML
INJECTION, SOLUTION INTRAVENOUS PRN
Status: DISCONTINUED | OUTPATIENT
Start: 2020-11-28 | End: 2020-11-28 | Stop reason: SURG

## 2020-11-28 RX ORDER — AMOXICILLIN 250 MG
2 CAPSULE ORAL 2 TIMES DAILY
Status: DISCONTINUED | OUTPATIENT
Start: 2020-11-28 | End: 2020-12-01 | Stop reason: HOSPADM

## 2020-11-28 RX ORDER — BISACODYL 10 MG
10 SUPPOSITORY, RECTAL RECTAL
Status: DISCONTINUED | OUTPATIENT
Start: 2020-11-28 | End: 2020-11-28

## 2020-11-28 RX ORDER — LIDOCAINE HYDROCHLORIDE 20 MG/ML
INJECTION, SOLUTION EPIDURAL; INFILTRATION; INTRACAUDAL; PERINEURAL PRN
Status: DISCONTINUED | OUTPATIENT
Start: 2020-11-28 | End: 2020-11-28 | Stop reason: SURG

## 2020-11-28 RX ORDER — LABETALOL HYDROCHLORIDE 5 MG/ML
5 INJECTION, SOLUTION INTRAVENOUS
Status: DISCONTINUED | OUTPATIENT
Start: 2020-11-28 | End: 2020-11-28 | Stop reason: HOSPADM

## 2020-11-28 RX ORDER — POLYETHYLENE GLYCOL 3350 17 G/17G
1 POWDER, FOR SOLUTION ORAL
Status: DISCONTINUED | OUTPATIENT
Start: 2020-11-28 | End: 2020-12-01 | Stop reason: HOSPADM

## 2020-11-28 RX ORDER — SUCCINYLCHOLINE/SOD CL,ISO/PF 200MG/10ML
SYRINGE (ML) INTRAVENOUS PRN
Status: DISCONTINUED | OUTPATIENT
Start: 2020-11-28 | End: 2020-11-28 | Stop reason: SURG

## 2020-11-28 RX ORDER — OXYCODONE HCL 5 MG/5 ML
5 SOLUTION, ORAL ORAL
Status: COMPLETED | OUTPATIENT
Start: 2020-11-28 | End: 2020-11-28

## 2020-11-28 RX ORDER — AMOXICILLIN 250 MG
2 CAPSULE ORAL 2 TIMES DAILY
Status: DISCONTINUED | OUTPATIENT
Start: 2020-11-28 | End: 2020-11-28

## 2020-11-28 RX ORDER — NEOSTIGMINE METHYLSULFATE 1 MG/ML
INJECTION, SOLUTION INTRAVENOUS PRN
Status: DISCONTINUED | OUTPATIENT
Start: 2020-11-28 | End: 2020-11-28 | Stop reason: SURG

## 2020-11-28 RX ORDER — OXYCODONE HCL 5 MG/5 ML
10 SOLUTION, ORAL ORAL
Status: COMPLETED | OUTPATIENT
Start: 2020-11-28 | End: 2020-11-28

## 2020-11-28 RX ORDER — DEXTROSE AND SODIUM CHLORIDE 5; .9 G/100ML; G/100ML
INJECTION, SOLUTION INTRAVENOUS CONTINUOUS
Status: DISCONTINUED | OUTPATIENT
Start: 2020-11-28 | End: 2020-11-30

## 2020-11-28 RX ADMIN — MEPERIDINE HYDROCHLORIDE 12.5 MG: 25 INJECTION INTRAMUSCULAR; INTRAVENOUS; SUBCUTANEOUS at 12:43

## 2020-11-28 RX ADMIN — Medication 80 MG: at 12:41

## 2020-11-28 RX ADMIN — ASPIRIN 81 MG: 81 TABLET, COATED ORAL at 17:36

## 2020-11-28 RX ADMIN — ONDANSETRON 4 MG: 2 INJECTION INTRAMUSCULAR; INTRAVENOUS at 13:24

## 2020-11-28 RX ADMIN — DEXTROSE AND SODIUM CHLORIDE: 5; 900 INJECTION, SOLUTION INTRAVENOUS at 01:44

## 2020-11-28 RX ADMIN — ONDANSETRON 4 MG: 2 INJECTION INTRAMUSCULAR; INTRAVENOUS at 00:33

## 2020-11-28 RX ADMIN — DOCUSATE SODIUM 50 MG AND SENNOSIDES 8.6 MG 2 TABLET: 8.6; 5 TABLET, FILM COATED ORAL at 17:36

## 2020-11-28 RX ADMIN — ROCURONIUM BROMIDE 5 MG: 10 INJECTION, SOLUTION INTRAVENOUS at 12:39

## 2020-11-28 RX ADMIN — MORPHINE SULFATE 4 MG: 4 INJECTION INTRAVENOUS at 00:33

## 2020-11-28 RX ADMIN — ACETAMINOPHEN 650 MG: 325 TABLET, FILM COATED ORAL at 17:37

## 2020-11-28 RX ADMIN — PROPOFOL 30 MG: 10 INJECTION, EMULSION INTRAVENOUS at 12:41

## 2020-11-28 RX ADMIN — HYDROMORPHONE HYDROCHLORIDE 1 MG: 1 INJECTION, SOLUTION INTRAMUSCULAR; INTRAVENOUS; SUBCUTANEOUS at 08:36

## 2020-11-28 RX ADMIN — ROCURONIUM BROMIDE 20 MG: 10 INJECTION, SOLUTION INTRAVENOUS at 12:47

## 2020-11-28 RX ADMIN — GLYCOPYRROLATE 0.4 MG: 0.2 INJECTION INTRAMUSCULAR; INTRAVENOUS at 13:34

## 2020-11-28 RX ADMIN — SODIUM CHLORIDE, POTASSIUM CHLORIDE, SODIUM LACTATE AND CALCIUM CHLORIDE: 600; 310; 30; 20 INJECTION, SOLUTION INTRAVENOUS at 12:37

## 2020-11-28 RX ADMIN — NEOSTIGMINE METHYLSULFATE 2 MG: 1 INJECTION INTRAVENOUS at 13:34

## 2020-11-28 RX ADMIN — DEXTROSE AND SODIUM CHLORIDE: 5; 900 INJECTION, SOLUTION INTRAVENOUS at 22:17

## 2020-11-28 RX ADMIN — PROPOFOL 20 MG: 10 INJECTION, EMULSION INTRAVENOUS at 12:39

## 2020-11-28 RX ADMIN — CEFAZOLIN 2 G: 330 INJECTION, POWDER, FOR SOLUTION INTRAMUSCULAR; INTRAVENOUS at 12:37

## 2020-11-28 RX ADMIN — OXYCODONE HYDROCHLORIDE 5 MG: 5 SOLUTION ORAL at 14:10

## 2020-11-28 RX ADMIN — LIDOCAINE HYDROCHLORIDE 40 MG: 20 INJECTION, SOLUTION EPIDURAL; INFILTRATION; INTRACAUDAL at 12:39

## 2020-11-28 RX ADMIN — TRANEXAMIC ACID 800 MG: 100 INJECTION, SOLUTION INTRAVENOUS at 12:37

## 2020-11-28 RX ADMIN — CEFAZOLIN SODIUM 1 G: 1 INJECTION, SOLUTION INTRAVENOUS at 22:17

## 2020-11-28 RX ADMIN — MEPERIDINE HYDROCHLORIDE 12.5 MG: 25 INJECTION INTRAMUSCULAR; INTRAVENOUS; SUBCUTANEOUS at 13:24

## 2020-11-28 ASSESSMENT — PAIN DESCRIPTION - PAIN TYPE
TYPE: SURGICAL PAIN
TYPE: ACUTE PAIN;SURGICAL PAIN
TYPE: ACUTE PAIN
TYPE: SURGICAL PAIN
TYPE: ACUTE PAIN;SURGICAL PAIN

## 2020-11-28 ASSESSMENT — LIFESTYLE VARIABLES
DOES PATIENT WANT TO STOP DRINKING: NO
TOTAL SCORE: 0
EVER FELT BAD OR GUILTY ABOUT YOUR DRINKING: NO
CONSUMPTION TOTAL: NEGATIVE
AVERAGE NUMBER OF DAYS PER WEEK YOU HAVE A DRINK CONTAINING ALCOHOL: 1
ON A TYPICAL DAY WHEN YOU DRINK ALCOHOL HOW MANY DRINKS DO YOU HAVE: 2
HAVE PEOPLE ANNOYED YOU BY CRITICIZING YOUR DRINKING: NO
EVER HAD A DRINK FIRST THING IN THE MORNING TO STEADY YOUR NERVES TO GET RID OF A HANGOVER: NO
HAVE YOU EVER FELT YOU SHOULD CUT DOWN ON YOUR DRINKING: NO
TOTAL SCORE: 0
HOW MANY TIMES IN THE PAST YEAR HAVE YOU HAD 5 OR MORE DRINKS IN A DAY: 0
TOTAL SCORE: 0
ALCOHOL_USE: YES

## 2020-11-28 ASSESSMENT — COGNITIVE AND FUNCTIONAL STATUS - GENERAL
MOVING TO AND FROM BED TO CHAIR: A LITTLE
MOBILITY SCORE: 19
DAILY ACTIVITIY SCORE: 24
STANDING UP FROM CHAIR USING ARMS: A LITTLE
CLIMB 3 TO 5 STEPS WITH RAILING: A LITTLE
MOVING FROM LYING ON BACK TO SITTING ON SIDE OF FLAT BED: A LITTLE
SUGGESTED CMS G CODE MODIFIER DAILY ACTIVITY: CH
SUGGESTED CMS G CODE MODIFIER MOBILITY: CK
WALKING IN HOSPITAL ROOM: A LITTLE

## 2020-11-28 ASSESSMENT — PATIENT HEALTH QUESTIONNAIRE - PHQ9
1. LITTLE INTEREST OR PLEASURE IN DOING THINGS: NOT AT ALL
SUM OF ALL RESPONSES TO PHQ9 QUESTIONS 1 AND 2: 0
2. FEELING DOWN, DEPRESSED, IRRITABLE, OR HOPELESS: NOT AT ALL

## 2020-11-28 ASSESSMENT — ENCOUNTER SYMPTOMS
FEVER: 0
HEADACHES: 0
COUGH: 1
FALLS: 1
FOCAL WEAKNESS: 0
RESPIRATORY NEGATIVE: 1
CARDIOVASCULAR NEGATIVE: 1
EYES NEGATIVE: 1
VOMITING: 0
ABDOMINAL PAIN: 0
GASTROINTESTINAL NEGATIVE: 1
NAUSEA: 1
NEUROLOGICAL NEGATIVE: 1
SEIZURES: 0
PSYCHIATRIC NEGATIVE: 1
CONSTITUTIONAL NEGATIVE: 1
CHILLS: 0
SHORTNESS OF BREATH: 0

## 2020-11-28 ASSESSMENT — PAIN SCALES - GENERAL: PAIN_LEVEL: 0

## 2020-11-28 NOTE — ANESTHESIA QCDR
2019 [facility] Clinical Data Registry (for Quality Improvement)     Postoperative nausea/vomiting risk protocol (Adult = 18 yrs and Pediatric 3-17 yrs)- (430 and 463)  General inhalation anesthetic (NOT TIVA) with PONV risk factors: No  Provision of anti-emetic therapy with at least 2 different classes of agents: N/A  Patient DID NOT receive anti-emetic therapy and reason is documented in Medical Record: N/A    Multimodal Pain Management- (477)  Non-emergent surgery AND patient age >= 18: No  Use of Multimodal Pain Management, two or more drugs and/or interventions, NOT including systemic opioids:   Exception: Documented allergy to multiple classes of analgesics:     Smoking Abstinence (404)  Patient is current smoker (cigarette, pipe, e-cig, marijuanna): No  Elective Surgery:   Abstinence instructions provided prior to day of surgery:   Patient abstained from smoking on day of surgery:     Pre-Op Beta-Blocker in Isolated CABG (44)  Isolated CABG AND patient age >= 18: No  Beta-blocker admin within 24 hours of surgical incision:   Exception:of medical reason(s) for not administering beta blocker within 24 hours prior to surgical incision (e.g., not  indicated,other medical reason):     PACU assessment of acute postoperative pain prior to Anesthesia Care End- Applies to Patients Age = 18- (ABG7)  Initial PACU pain score is which of the following: < 7/10  Patient unable to report pain score: N/A    Post-anesthetic transfer of care checklist/protocol to PACU/ICU- (426 and 427)  Upon conclusion of case, patient transferred to which of the following locations:   Use of transfer checklist/protocol: Yes  Exclusion: Service Performed in Patient Hospital Room (and thus did not require transfer): N/A  Unplanned admission to ICU related to anesthesia service up through end of PACU care- (MD51)  Unplanned admission to ICU (not initially anticipated at anesthesia start time): No

## 2020-11-28 NOTE — ANESTHESIA PREPROCEDURE EVALUATION
Relevant Problems   NEURO   (+) History of CVA (cerebrovascular accident)      CARDIAC   (+) Ascending aorta dilatation (HCC)       Physical Exam    Airway   Mallampati: II  TM distance: >3 FB  Neck ROM: full       Cardiovascular - normal exam  Rhythm: regular  Rate: normal     Dental - normal exam           Pulmonary - normal exam  Breath sounds clear to auscultation     Abdominal    Neurological - normal exam                 Anesthesia Plan    ASA 4   ASA physical status 4 criteria: CVA or TIA - recent (< 3 months)    Plan - general       Airway plan will be ETT        Induction: intravenous    Postoperative Plan: Postoperative administration of opioids is intended.    Pertinent diagnostic labs and testing reviewed    Informed Consent:    Anesthetic plan and risks discussed with patient.    Use of blood products discussed with: patient whom consented to blood products.

## 2020-11-28 NOTE — DISCHARGE PLANNING
Assessment done at bedside with pt's daughter. Pt lives alone in [place] in one story home. Tonight one of his daughters was visiting and brought her dog along. Pt was going out to garage and dogs became entangled in his feet causing him to trip/fall. Prior to this admit pt had no medical needs and was very independent. Pt uses the [facility] on [address] for RX needs. Pt's daughter (Chiara) will be available upon pt's DC.    Care Transition Team Assessment    Information Source  Orientation : Oriented x 4  Information Given By: Patient  Who is responsible for making decisions for patient? : Patient    Readmission Evaluation  Is this a readmission?: No    Elopement Risk  Legal Hold: No    Interdisciplinary Discharge Planning  Does Admitting Nurse Feel This Could be a Complex Discharge?: No  Primary Care Physician: Ria Rollins MD  Lives with - Patient's Self Care Capacity: Alone and Able to Care For Self(Daughter Chiara lives close by)  Support Systems: Children(2 adult daughters and friends)  Housing / Facility: [address]  Do You Take your Prescribed Medications Regularly: Yes([facility] [address])  Able to Return to Previous ADL's: Future Time w/Therapy  Mobility Issues: Yes(tripped over dogs in garage)  Prior Services: None  Patient Prefers to be Discharged to:: home ?rehab  Assistance Needed: Unknown at this Time  Durable Medical Equipment: Not Applicable    Discharge Preparedness  What are your discharge supports?: (2 daughters and friends)  Prior Functional Level: Ambulatory, Drives Self, Independent with Activities of Daily Living, Independent with Medication Management  Difficulity with ADLs: None    Functional Assesment  Prior Functional Level: Ambulatory, Drives Self, Independent with Activities of Daily Living, Independent with Medication Management    Finances  Financial Barriers to Discharge: No  Prescription Coverage: Yes    Vision / Hearing Impairment  Vision Impairment : No  Hearing Impairment : No    Values /  Beliefs / Concerns  Values / Beliefs Concerns : No    Advance Directive  Advance Directive?: Living Will    Domestic Abuse  Have you ever been the victim of abuse or violence?: No    Psychological Assessment  History of Substance Abuse: None  History of Psychiatric Problems: No    Discharge Risks or Barriers  Discharge risks or barriers?: (rehab after surgery)    Anticipated Discharge Information  Discharge Disposition: Still a Patient (30)  Discharge Address: [facility] [address]  Discharge Contact Phone Number: Chiara (daughter) [phone]

## 2020-11-28 NOTE — OP REPORT
DATE OF SERVICE:  11/28/2020    PREOPERATIVE DIAGNOSIS:  Right femoral neck fracture, closed, displaced.    POSTOPERATIVE DIAGNOSIS:  Right femoral neck fracture, closed, displaced.    SURGICAL PROCEDURES:  Right hip hemiarthroplasty.    SURGEON:  Kendrick Royal MD    ASSISTANT:  Joe Boykin PA-C    ANESTHESIOLOGIST:  Dimitris Lancaster MD    ANESTHETIC:  General.    ESTIMATED BLOOD LOSS:  250 mL.    IMPLANTS:  1.  Smith and Nephew size 7 Polarstem, with collar.  2.  Tandem 51 mm shell with 28 mm/ +4 Oxinium head.    INDICATIONS:  The patient is 80 years old.  He had a ground level fall   yesterday, injuring his right hip, was seen in the emergency room, found to   have a displaced femoral neck fracture, admitted to the hospitalist service.    Dr. Ortiz saw him and asked me to oversee his definitive orthopedic care.  I   recommended hemiarthroplasty given older age.  Risks include bleeding,   infection, neurovascular injury, pain, stiffness, fracture dislocation, leg   length discrepancy, implant failure, thromboembolic phenomena, anesthetic and   medical complications, etc.    DESCRIPTION OF PROCEDURE:  The patient was identified in the preoperative   holding area, right leg was marked.  He was taken to the operating room where   general anesthetic was administered.  Intravenous antibiotics and tranexamic   acid were given.  He was placed in lateral decubitus position with the right   side up.  The right hindquarter was then prepped and draped in sterile   fashion.  Time-out was held to confirm the patient identity and correct   surgical site.    A posterior approach to the hip was performed with longitudinal   incision centered over the greater trochanter.  This was carried down to the   IT band, which was split longitudinally.  Split was carried into the gluteus   sumanth in line with its fibers.  There was some severely contused   subcutaneous fat, which was removed with a rongeur.  Hematoma was  removed   as well. Charnley retractor was placed deep to the IT band.  The short external   rotators were then elevated the proximal femur with electrocautery and a   capsulotomy was performed.  The femoral head was removed and sized.    I then opened up the proximal femur with canal finding reamer and then serial   broaches up to size 7 where there is good axial and rotational stability.  We   used the calcar reamer to plane the proximal femur and then trialed with a +0   head, which was a little bit too unstable, so we used a +4 head.  This gave   better leg length and stability.    The trial was removed.  The Polarstem was then inserted.  I then trialed once   more with the +4 head and found good stability and leg lengths.  The Palomares   taper was washed and dried after removing the trial bipolar component and then   the tandem shell and head were capped into place in standard fashion.  The   hip was reduced.  Capsule was repaired with #1 Vicryl.  Short external   rotators were repaired to the proximal femur with #1 Vicryl.  The IT band and   gluteus sumanht fascia were closed with #1 Vicryl.  Skin was closed with 2-0   Vicryl and 3-0 Monocryl.  Steri-Strips were applied followed by gauze and   Tegaderms.  The patient was transferred to his bed in supine position, a   pillow between his legs.  Leg lengths were noted to be approximately equal.    He was then extubated and taken to recovery room in stable condition.    POSTOPERATIVE PLAN:  1.  Intravenous antibiotics for 24 hours.  2.  DVT prophylaxis with early mobilization, SCDs and aspirin 81 mg p.o.   b.i.d. for 4 weeks.  3.  Weightbear as tolerated with posterior hip precautions.  4.  AP pelvis radiograph in recovery room.  5.  Ongoing preoperative medical management per hospitalist.       ____________________________________     MD BOB FLORES / KOFI    DD:  11/28/2020 13:58:51  DT:  11/28/2020 14:25:41    D#:  [id number]  Job#:  [id number]

## 2020-11-28 NOTE — ED NOTES
Pt placed on 2L O2 for O2 sat 88% on room air after morphine. States pain improving from 4/10 to 2/10.

## 2020-11-28 NOTE — H&P
Hospital Medicine History & Physical Note    Date of Service  11/27/2020    Primary Care Physician  Ria Rollins M.D.    Consultants  Orthopedics    Code Status  Full Code    Chief Complaint  Chief Complaint   Patient presents with   • T-5000 GLF     Pt fell from standing @ 2130, tripped over 2 dogs, fell on Rt hip. No head injury. Family helped up then called [facility]- pt unable to wt bear without 8/10 pain. 2/10 at present. PPP.       History of Presenting Illness  80 y.o. male who presented 11/27/2020 with a mechanical fall. Patient was trying to reach muffins out of the freezer when he tripped and fell over his 2 dogs that he did not see. He reports immediate R hip pain after the fall and inability to fall. Denies loss of consciousness, history of falls, excessive bleeding, dizziness.     Patient states that he had a stroke in June, and since then, he has been taking asa 81 mg po daily and lipitor 80 mg po qhs.     He is a social drinker, and denies tobacco use.     In ED, patient found to have normal vital signs. Remarkable labs include macrocytic anemia, pre renal azotemia. CXR shows no acute cardiopulmonary abnormalities. XRAY right femur shows impacted right femoral fracture.     Review of Systems  Review of Systems   Constitutional: Negative.    HENT: Negative.    Eyes: Negative.    Respiratory: Negative.    Cardiovascular: Negative.    Gastrointestinal: Negative.    Genitourinary: Negative.    Musculoskeletal: Positive for joint pain.   Skin: Negative.    Neurological: Negative.    Endo/Heme/Allergies: Negative.    Psychiatric/Behavioral: Negative.          Past Medical History   has a past medical history of Cataract (01/22/2020), Dental disorder (01/22/2020), High cholesterol (01/22/2020), Hyperlipidemia (1/9/2017), and Lupus (systemic lupus erythematosus) ([facility]).    Surgical History   has a past surgical history that includes other surgical procedure (Right, 1972); cast application; cataract phaco with  iol (Right, 1/28/2020); cataract phaco with iol (Left, 2/11/2020); and open reduction.     Family History  family history includes Alcohol/Drug in his brother; Cancer in his brother and mother; Diabetes in his mother; Heart Disease (age of onset: 55) in his father; Hypertension in his father; No Known Problems in his brother and brother; Stroke (age of onset: 92) in his mother.     Social History   reports that he quit smoking about 58 years ago. His smoking use included cigarettes. He has a 4.00 pack-year smoking history. He has never used smokeless tobacco. He reports current alcohol use of about 0.6 oz of alcohol per week. He reports that he does not use drugs.    Allergies  No Known Allergies    Medications  Prior to Admission Medications   Prescriptions Last Dose Informant Patient Reported? Taking?   CALCIUM PO   Yes No   Sig: Take  by mouth.   ascorbic acid (ASCORBIC ACID) 500 MG Tab  Patient Yes No   Sig: Take 500 mg by mouth every morning.   aspirin 81 MG EC tablet   No No   Sig: Take 1 Tab by mouth every day.   atorvastatin (LIPITOR) 80 MG tablet   No No   Sig: Take 1 Tab by mouth every evening.   carbamide peroxide (DEBROX) 6.5 % Solution   No No   Sig: Instill 5 to 10 drops twice daily up to 4 days ( left ear).   cod liver oil Cap   Yes No   Sig: Take 1 Cap by mouth every day.   multivitamin (THERAGRAN) Tab  Patient Yes No   Sig: Take 1 Tab by mouth every morning.      Facility-Administered Medications: None       Physical Exam  Temp:  [36 °C (96.8 °F)] 36 °C (96.8 °F)  Pulse:  [72] 72  Resp:  [18] 18  BP: (162)/(77) 162/77    Physical Exam  Constitutional:       Appearance: Normal appearance.   Cardiovascular:      Rate and Rhythm: Normal rate and regular rhythm.      Pulses: Normal pulses.   Pulmonary:      Effort: Pulmonary effort is normal.      Breath sounds: Normal breath sounds.   Abdominal:      General: Abdomen is flat.   Musculoskeletal:      Comments: Right lower extremity slightly externally  rotated  Pulses 2+ extremities   Skin:     General: Skin is warm.   Neurological:      General: No focal deficit present.      Mental Status: He is alert and oriented to person, place, and time.           Laboratory:  Recent Labs     11/27/20 2254   WBC 9.6   RBC 3.80*   HEMOGLOBIN 12.6*   HEMATOCRIT 38.4*   .1*   MCH 33.2*   MCHC 32.8*   RDW 51.6*   PLATELETCT 195   MPV 10.6     Recent Labs     11/27/20  2254   SODIUM 138   POTASSIUM 4.4   CHLORIDE 108   CO2 22   GLUCOSE 118*   BUN 31*   CREATININE 1.10   CALCIUM 9.1     Recent Labs     11/27/20  2254   ALTSGPT 29   ASTSGOT 30   ALKPHOSPHAT 73   TBILIRUBIN 1.4   GLUCOSE 118*     Recent Labs     11/27/20 2254   APTT 26.6   INR 1.08     No results for input(s): NTPROBNP in the last 72 hours.      No results for input(s): TROPONINT in the last 72 hours.    Imaging:  DX-PELVIS-1 OR 2 VIEWS   Final Result      Impacted right femoral neck fracture      DX-FEMUR-2+ RIGHT   Final Result      Impacted right femoral neck fracture      DX-CHEST-PORTABLE (1 VIEW)    (Results Pending)         Assessment/Plan:  I anticipate this patient will require at least two midnights for appropriate medical management, necessitating inpatient admission.    * Femoral neck fracture (HCC)  Assessment & Plan  X-ray confirms right femoral neck fracture  N.p.o.  Orthopedics called, will go for OR tomorrow  D5  mL/h  Dilaudid 1 mg q4h prn     History of CVA (cerebrovascular accident)- (present on admission)  Assessment & Plan  Continue asa 81 mg po daily starting tomorrow after OR     Dyslipidemia- (present on admission)  Assessment & Plan  Continue lipitor 80 mg po qhs

## 2020-11-28 NOTE — PROGRESS NOTES
Plan right hip hemiarthroplasty  Discussed with patient (and daughter by phone)  Right hip marked  Consent signed

## 2020-11-28 NOTE — ED PROVIDER NOTES
ED Provider Note     Scribed for Chiara Porter D.O. by Roxann Coreas. 11/27/2020, 10:30 PM.     Primary care provider: Ria Rollins M.D.  Means of arrival: EMS    History obtained from: Walk-in  History limited by: Patient    CHIEF COMPLAINT  Chief Complaint   Patient presents with   • T-5000 GLF     Pt fell from standing @ 2130, tripped over 2 dogs, fell on Rt hip. No head injury. Family helped up then called [facility]- pt unable to wt bear without 8/10 pain. 2/10 at present. PPP.     HPI  Erasto Calhoun Jr. is a 80 y.o. male who presents to the Emergency Department for evaluation after ground level fall. The patient went out to the garage to get a muffin out of his freezer when he got tangled up with his dog and fell onto the concrete ground. The patient reports right knee and right hip pain. He also has a skin tear on his right elbow. He could not stand after the fall because of the right hip pain. Patient denies hitting his head or any loss of consciousness. The patient had a stroke on 7/14/2020 and now takes daily Aspirin. He denies having any allergies to medications.     REVIEW OF SYSTEMS  Pertinent positives include right hip pain, right knee pain, and right elbow skin tare. Pertinent negatives include no head trauma or loss of consciousness.   See HPI for further details. All other systems are negative.    PAST MEDICAL HISTORY  Past Medical History:   Diagnosis Date   • Cataract 01/22/2020    bilateral   • Dental disorder 01/22/2020    upper and lower dentures   • High cholesterol 01/22/2020    on med   • Hyperlipidemia 1/9/2017    Pravastin x 10 yrs    • Lupus (systemic lupus erythematosus) (HCC)     Pt reports only symptom was rash, on plaquenil for several yrs, stopped 2004     FAMILY HISTORY  Family History   Problem Relation Age of Onset   • Cancer Mother         skin cancer   • Stroke Mother 92   • Diabetes Mother    • Hypertension Father    • Heart Disease Father 55        father  passed from heart attack   • No Known Problems Brother    • No Known Problems Brother    • Cancer Brother         throat ca   • Alcohol/Drug Brother         heavy drinker     SOCIAL HISTORY  Social History     Tobacco Use   • Smoking status: Former Smoker     Packs/day: 1.00     Years: 4.00     Pack years: 4.00     Types: Cigarettes     Quit date: 1962     Years since quittin.9   • Smokeless tobacco: Never Used   • Tobacco comment: quit    Substance Use Topics   • Alcohol use: Yes     Alcohol/week: 0.6 oz     Types: 1 Shots of liquor per week     Comment: 1 per week   • Drug use: No      Social History     Substance and Sexual Activity   Drug Use No     SURGICAL HISTORY  Past Surgical History:   Procedure Laterality Date   • CATARACT PHACO WITH IOL Left 2020    Procedure: PHACOEMULSIFICATION, CATARACT, WITH IOL INSERTION;  Surgeon: Kiran Jang M.D.;  Location: SURGERY SAME DAY [facility];  Service: Ophthalmology   • CATARACT PHACO WITH IOL Right 2020    Procedure: PHACOEMULSIFICATION, CATARACT, WITH IOL INSERTION;  Surgeon: Kiran Jang M.D.;  Location: SURGERY SAME DAY [facility];  Service: Ophthalmology   • OTHER SURGICAL PROCEDURE Right     right heel injury   • CAST APPLICATION      climbing, collar bone injury (7th grade)   • OPEN REDUCTION       CURRENT MEDICATIONS    Current Facility-Administered Medications:   •  ondansetron (ZOFRAN) syringe/vial injection 4 mg, 4 mg, Intravenous, Once, Chiara Porter D.O.    Current Outpatient Medications:   •  carbamide peroxide (DEBROX) 6.5 % Solution, Instill 5 to 10 drops twice daily up to 4 days ( left ear)., Disp: 15 mL, Rfl: 1  •  cod liver oil Cap, Take 1 Cap by mouth every day., Disp: , Rfl:   •  atorvastatin (LIPITOR) 80 MG tablet, Take 1 Tab by mouth every evening., Disp: 100 Tab, Rfl: 2  •  aspirin 81 MG EC tablet, Take 1 Tab by mouth every day., Disp: 100 Tab, Rfl: 2  •  CALCIUM PO, Take  by mouth., Disp: , Rfl:   •   "ascorbic acid (ASCORBIC ACID) 500 MG Tab, Take 500 mg by mouth every morning., Disp: , Rfl:   •  multivitamin (THERAGRAN) Tab, Take 1 Tab by mouth every morning., Disp: , Rfl:     ALLERGIES  No Known Allergies    PHYSICAL EXAM  VITAL SIGNS: BP (!) 162/77   Pulse 72   Temp 36 °C (96.8 °F) (Temporal)   Resp 18   Ht 1.803 m (5' 11\")   Wt 75.8 kg (167 lb)   BMI 23.29 kg/m²     Constitutional: Patient is well developed, well nourished. Mild distress. Very pleasant.   HENT: Normocephalic, atraumatic. Oropharynx moist.  Eyes: PERRL, EOMI   Neck: Supple with Normal range of motion in flexion, extension and lateral rotation. No tenderness along the bony prominences or paraspinal muscles.  Lymphatic: No lymphadenopathy noted.   Cardiovascular: Normal heart rate and Regular rhythm. No murmur  Thorax & Lungs: Clear and equal breath sounds with good excursion. No respiratory distress, no rhonchi, wheezing or rales  Abdomen: Bowel sounds normal in all four quadrants. Soft,nontender, no signs of trauma.  Skin: 4 cm skin tear to right elbow on the lateral aspect that extends through the epidermis. Warm, Dry  Back: No cervical, thoracic, or lumbosacral tenderness.    Extremities: Peripheral pulses 4/4 No edema,  Good capillary refill.   Musculoskeletal: Palpable tenderness to lateral aspect of the right hip, limited range of motion secondary to pain. Mild right leg shortening with internal rotation. Normal extension and flexion of the elbow, good distal sensation.   Neurologic: Alert & oriented x 3, Normal motor function, Normal sensory function, No lateralizing or focal deficits noted. DTR's 4/4 bilaterally.  Psychiatric: Affect normal, Judgment normal, Mood normal.     DIAGNOSTICS/PROCEDURES    LABS  Results for orders placed or performed during the hospital encounter of 11/27/20   CBC WITH DIFFERENTIAL   Result Value Ref Range    WBC 9.6 4.8 - 10.8 K/uL    RBC 3.80 (L) 4.70 - 6.10 M/uL    Hemoglobin 12.6 (L) 14.0 - 18.0 " g/dL    Hematocrit 38.4 (L) 42.0 - 52.0 %    .1 (H) 81.4 - 97.8 fL    MCH 33.2 (H) 27.0 - 33.0 pg    MCHC 32.8 (L) 33.7 - 35.3 g/dL    RDW 51.6 (H) 35.9 - 50.0 fL    Platelet Count 195 164 - 446 K/uL    MPV 10.6 9.0 - 12.9 fL    Neutrophils-Polys 47.20 44.00 - 72.00 %    Lymphocytes 40.10 22.00 - 41.00 %    Monocytes 11.20 0.00 - 13.40 %    Eosinophils 0.90 0.00 - 6.90 %    Basophils 0.40 0.00 - 1.80 %    Immature Granulocytes 0.20 0.00 - 0.90 %    Nucleated RBC 0.00 /100 WBC    Neutrophils (Absolute) 4.54 1.82 - 7.42 K/uL    Lymphs (Absolute) 3.87 1.00 - 4.80 K/uL    Monos (Absolute) 1.08 (H) 0.00 - 0.85 K/uL    Eos (Absolute) 0.09 0.00 - 0.51 K/uL    Baso (Absolute) 0.04 0.00 - 0.12 K/uL    Immature Granulocytes (abs) 0.02 0.00 - 0.11 K/uL    NRBC (Absolute) 0.00 K/uL   COMP METABOLIC PANEL   Result Value Ref Range    Sodium 138 135 - 145 mmol/L    Potassium 4.4 3.6 - 5.5 mmol/L    Chloride 108 96 - 112 mmol/L    Co2 22 20 - 33 mmol/L    Anion Gap 8.0 7.0 - 16.0    Glucose 118 (H) 65 - 99 mg/dL    Bun 31 (H) 8 - 22 mg/dL    Creatinine 1.10 0.50 - 1.40 mg/dL    Calcium 9.1 8.5 - 10.5 mg/dL    AST(SGOT) 30 12 - 45 U/L    ALT(SGPT) 29 2 - 50 U/L    Alkaline Phosphatase 73 30 - 99 U/L    Total Bilirubin 1.4 0.1 - 1.5 mg/dL    Albumin 3.8 3.2 - 4.9 g/dL    Total Protein 6.3 6.0 - 8.2 g/dL    Globulin 2.5 1.9 - 3.5 g/dL    A-G Ratio 1.5 g/dL   PROTHROMBIN TIME (INR)   Result Value Ref Range    PT 14.3 12.0 - 14.6 sec    INR 1.08 0.87 - 1.13   APTT   Result Value Ref Range    APTT 26.6 24.7 - 36.0 sec   ESTIMATED GFR   Result Value Ref Range    GFR If African American >60 >60 mL/min/1.73 m 2    GFR If Non African American >60 >60 mL/min/1.73 m 2     Labs reviewed by me    EKG  12 lead EKG interpreted by myself, see above.    RADIOLOGY/PROCEDURES  DX-CHEST-PORTABLE (1 VIEW)   Final Result      No acute cardiopulmonary findings.      DX-PELVIS-1 OR 2 VIEWS   Final Result      Impacted right femoral neck fracture       DX-FEMUR-2+ RIGHT   Final Result      Impacted right femoral neck fracture          Results and radiologist interpretation reviewed by me.     COURSE & MEDICAL DECISION MAKING  Pertinent Labs & Imaging studies reviewed. (See chart for details)    10:30 PM - Patient seen and evaluated at bedside. Ordered for CBC with differential, CMP, INR, APTT, and DX-Femur-Right, and DX-Pelvisto evaluate. Patient will be treated with Morphine 4 mg and Zofran 4 mg for his symptoms. Differential diagnoses include, but are not limited to, hip contusion vs. Hip fracture.     11:31 PM - Imaging revealed the patient has a right hip fracture.  Labs are unremarkable.    11:33 PM - Ordered DX-Chest, EKG, UA, and COVID/SARS CoV-2 PCR in preparation for hospitalization.     11:34 PM - I informed the patient that he fractured his right hip. He is still in pain. Discussed need for surgery at this time.  Additional pain medications have been ordered for the patient.    11:36 PM - Paged Ortho.     11:37 PM - I discussed the patient's case and the above findings with Dr. Ortiz (Ortho) who will consult on the patient's case. The patient will be taken to the OR by Ortho in the morning.    11:39 PM - Patient will be treated with Morphine 4 mg and Zofran 4 mg.     11:39 PM - Paged Hospitalist.    11:54 PM - I discussed the patient's case and the above findings with Dr. Mcmanus (Hospitalist) who agrees to evaluate the patient for hospitalization.         DISPOSITION:  Patient will be hospitalized by Dr. Mcmanus in guarded condition.    FINAL IMPRESSION  1. Fall from ground level    2. Closed fracture of neck of right femur, initial encounter ([facility])    3. Skin tear of right elbow without complication, initial encounter       Roxann JOSEPH (Scrjuan), am scribing for, and in the presence of, Chiara Porter D.O..    Electronically signed by: Roxann Coreas (Ronaldo), 11/27/2020    Chiara JOSEPH D.O. personally performed the  services described in this documentation, as scribed by Roxann Coreas in my presence, and it is both accurate and complete.    C.    The note accurately reflects work and decisions made by me.  Chiara Porter D.O.  11/28/2020  12:15 AM

## 2020-11-28 NOTE — ANESTHESIA TIME REPORT
Anesthesia Start and Stop Event Times     Date Time Event    11/28/2020 1038 Ready for Procedure     1237 Anesthesia Start     1343 Anesthesia Stop        Responsible Staff  11/28/20    Name Role Begin End    Dimitris Lancaster M.D. Anesth 1237 1343        Preop Diagnosis (Free Text):  Pre-op Diagnosis      right hip fracture        Preop Diagnosis (Codes):    Post op Diagnosis  Femoral neck stress fracture, initial encounter      Premium Reason  E. Weekend    Comments:

## 2020-11-28 NOTE — PROGRESS NOTES
[facility] Medicine Daily Progress Note    Date of Service  11/28/2020    Chief Complaint  80 y.o. male admitted 11/27/2020 with right hip pain post mechanical fall    Hospital Course  An 80-year-old man with h/o CVA presented with right hip pain post mechanical fall. He tripped over his 2 dogs and fell.  XR showed impacted right femoral neck fracture. Orthopedics consulted, patient is scheduled for OR today    Interval Problem Update  Patient reports right hip pain 7/10.  Hemodynamically stable, afebrile  COVID 19 negative  CXR showed no acute cardiopulmonary findings  Scheduled for OR today  NPO    Consultants/Specialty  Orthopedic Surgery    Code Status  Full Code    Disposition  TBD    Review of Systems  Review of Systems   Constitutional: Negative for chills and fever.   HENT: Negative.    Eyes: Negative.    Respiratory: Positive for cough. Negative for shortness of breath.    Cardiovascular: Negative for chest pain and leg swelling.   Gastrointestinal: Positive for nausea. Negative for abdominal pain and vomiting.   Genitourinary: Negative for dysuria.   Musculoskeletal: Positive for falls and joint pain (right hip).   Skin: Negative for rash.   Neurological: Negative for focal weakness, seizures and headaches.        Physical Exam  Temp:  [36 °C (96.8 °F)-36.3 °C (97.4 °F)] 36.2 °C (97.2 °F)  Pulse:  [62-73] 68  Resp:  [14-32] 16  BP: (118-162)/(62-80) 154/80  SpO2:  [95 %-100 %] 99 %    Physical Exam  Vitals signs and nursing note reviewed.   Constitutional:       Appearance: Normal appearance.   HENT:      Head: Normocephalic.      Nose: Nose normal.      Mouth/Throat:      Mouth: Mucous membranes are moist.      Pharynx: Oropharynx is clear.   Eyes:      Pupils: Pupils are equal, round, and reactive to light.   Neck:      Musculoskeletal: Normal range of motion.   Cardiovascular:      Rate and Rhythm: Normal rate and regular rhythm.      Heart sounds: No murmur.   Pulmonary:      Effort: Pulmonary effort is  normal. No respiratory distress.   Abdominal:      General: Abdomen is flat. Bowel sounds are normal.      Tenderness: There is no abdominal tenderness.   Musculoskeletal: Normal range of motion.         General: Tenderness (right hip) present.   Neurological:      General: No focal deficit present.      Mental Status: He is alert and oriented to person, place, and time.      Cranial Nerves: No cranial nerve deficit.      Motor: No weakness.         Fluids  No intake or output data in the 24 hours ending 11/28/20 1120    Laboratory  Recent Labs     11/27/20  2254   WBC 9.6   RBC 3.80*   HEMOGLOBIN 12.6*   HEMATOCRIT 38.4*   .1*   MCH 33.2*   MCHC 32.8*   RDW 51.6*   PLATELETCT 195   MPV 10.6     Recent Labs     11/27/20  2254   SODIUM 138   POTASSIUM 4.4   CHLORIDE 108   CO2 22   GLUCOSE 118*   BUN 31*   CREATININE 1.10   CALCIUM 9.1     Recent Labs     11/27/20  2254   APTT 26.6   INR 1.08               Imaging  DX-CHEST-PORTABLE (1 VIEW)   Final Result      No acute cardiopulmonary findings.      DX-PELVIS-1 OR 2 VIEWS   Final Result      Impacted right femoral neck fracture      DX-FEMUR-2+ RIGHT   Final Result      Impacted right femoral neck fracture           Assessment/Plan  * Femoral neck fracture (HCC)  Assessment & Plan  X-ray confirms right femoral neck fracture  NPO.  Orthopedics: OR today  D5  mL/h  Dilaudid 1 mg q4h prn   Tylenol prn    History of CVA (cerebrovascular accident)- (present on admission)  Assessment & Plan  Continue asa 81 mg po daily after OR     Dyslipidemia- (present on admission)  Assessment & Plan  Continue lipitor 80 mg po qhs       VTE prophylaxis: heparin

## 2020-11-28 NOTE — ANESTHESIA POSTPROCEDURE EVALUATION
Patient: Erasto Calhoun Jr.    Procedure Summary     Date: 11/28/20 Room / Location: [place] [address] / SURGERY [facility]    Anesthesia Start: 1237 Anesthesia Stop: 1343    Procedure: HEMIARTHROPLASTY, HIP (Right Hip) Diagnosis: (right hip fracture)    Surgeons: Kendrick Royal M.D. Responsible Provider: Dimitris Lancaster M.D.    Anesthesia Type: general ASA Status: 4          Final Anesthesia Type: general  Last vitals  BP   Blood Pressure : 135/85    Temp   37.1 °C (98.8 °F)    Pulse   Pulse: 70   Resp   18    SpO2   93 %      Anesthesia Post Evaluation    Patient location during evaluation: PACU  Patient participation: complete - patient participated  Level of consciousness: awake and alert  Pain score: 0    Airway patency: patent  Anesthetic complications: no  Cardiovascular status: hemodynamically stable  Respiratory status: acceptable  Hydration status: euvolemic    PONV: none           Nurse Pain Score: 7 (NPRS)

## 2020-11-28 NOTE — PROGRESS NOTES
2 RN skin admission skin note    R elbow skin tear upon arrival dressed with bandage prior to arrival. No other skin issued noted during assessment.

## 2020-11-28 NOTE — ED TRIAGE NOTES
Erasto Calhoun Jr.  80 y.o.  Male  Chief Complaint   Patient presents with   • T-5000 GLF     Pt fell from standing @ 2130, tripped over 2 dogs, fell on Rt hip. No head injury. Family helped up then called [facility]- pt unable to wt bear without 8/10 pain. 2/10 at present. PPP.

## 2020-11-28 NOTE — THERAPY
Contact Note    Patient Name: Erasto Calhoun Jr.  Age:  80 y.o., Sex:  male  Medical Record #: [id number]  Today's Date: 11/28/2020    consult received; R hip fx to OR today; will see post (likely tomorrow AM) as appropriate

## 2020-11-28 NOTE — OR NURSING
Pt on 3 L NC.  No c/o nausea, tolerating PO fluids and medication.  Right lateral hip surgical site CDI, ice pack in place. Right pedal pulse +3.  Pillow between knees per surgeon vs abduction pillow. Surgical pain tolerable per pt, 3/10.  VSS, afebrile, BAXTER, A/O x4.    Upper/lower dentures in place.   Transported on oxygen with mask in place.   Oxygen tank 75% full.

## 2020-11-28 NOTE — ANESTHESIA PROCEDURE NOTES
Airway    Date/Time: 11/28/2020 12:41 PM  Performed by: Dimitris Lancaster M.D.  Authorized by: Dimitris Lancaster M.D.     Location:  OR  Urgency:  Elective  Indications for Airway Management:  Anesthesia and airway protection      Spontaneous Ventilation: absent    Sedation Level:  Deep  Preoxygenated: Yes    Patient Position:  Sniffing  Final Airway Type:  Endotracheal airway  Final Endotracheal Airway:  ETT  Cuffed: Yes    Technique Used for Successful ETT Placement:  Direct laryngoscopy  Devices/Methods Used in Placement:  Intubating stylet    Insertion Site:  Oral  Blade Type:  Mullins  Laryngoscope Blade/Videolaryngoscope Blade Size:  2  ETT Size (mm):  7.0  Measured from:  Lips  ETT to Lips (cm):  20  Placement Verified by: auscultation and capnometry    Cormack-Lehane Classification:  Grade I - full view of glottis  Number of Attempts at Approach:  1

## 2020-11-29 LAB
ALBUMIN SERPL BCP-MCNC: 3 G/DL (ref 3.2–4.9)
ALBUMIN/GLOB SERPL: 1.2 G/DL
ALP SERPL-CCNC: 65 U/L (ref 30–99)
ALT SERPL-CCNC: 18 U/L (ref 2–50)
ANION GAP SERPL CALC-SCNC: 5 MMOL/L (ref 7–16)
AST SERPL-CCNC: 21 U/L (ref 12–45)
BILIRUB SERPL-MCNC: 1.5 MG/DL (ref 0.1–1.5)
BUN SERPL-MCNC: 24 MG/DL (ref 8–22)
CALCIUM SERPL-MCNC: 8.3 MG/DL (ref 8.5–10.5)
CHLORIDE SERPL-SCNC: 108 MMOL/L (ref 96–112)
CO2 SERPL-SCNC: 24 MMOL/L (ref 20–33)
CREAT SERPL-MCNC: 0.83 MG/DL (ref 0.5–1.4)
ERYTHROCYTE [DISTWIDTH] IN BLOOD BY AUTOMATED COUNT: 54.6 FL (ref 35.9–50)
GLOBULIN SER CALC-MCNC: 2.5 G/DL (ref 1.9–3.5)
GLUCOSE SERPL-MCNC: 127 MG/DL (ref 65–99)
HCT VFR BLD AUTO: 35.7 % (ref 42–52)
HGB BLD-MCNC: 11.4 G/DL (ref 14–18)
MCH RBC QN AUTO: 33.3 PG (ref 27–33)
MCHC RBC AUTO-ENTMCNC: 31.9 G/DL (ref 33.7–35.3)
MCV RBC AUTO: 104.4 FL (ref 81.4–97.8)
PLATELET # BLD AUTO: 179 K/UL (ref 164–446)
PMV BLD AUTO: 10.6 FL (ref 9–12.9)
POTASSIUM SERPL-SCNC: 3.7 MMOL/L (ref 3.6–5.5)
PROT SERPL-MCNC: 5.5 G/DL (ref 6–8.2)
RBC # BLD AUTO: 3.42 M/UL (ref 4.7–6.1)
SODIUM SERPL-SCNC: 137 MMOL/L (ref 135–145)
WBC # BLD AUTO: 10.8 K/UL (ref 4.8–10.8)

## 2020-11-29 PROCEDURE — A9270 NON-COVERED ITEM OR SERVICE: HCPCS | Performed by: ORTHOPAEDIC SURGERY

## 2020-11-29 PROCEDURE — 700111 HCHG RX REV CODE 636 W/ 250 OVERRIDE (IP): Performed by: STUDENT IN AN ORGANIZED HEALTH CARE EDUCATION/TRAINING PROGRAM

## 2020-11-29 PROCEDURE — 700102 HCHG RX REV CODE 250 W/ 637 OVERRIDE(OP): Performed by: STUDENT IN AN ORGANIZED HEALTH CARE EDUCATION/TRAINING PROGRAM

## 2020-11-29 PROCEDURE — 99232 SBSQ HOSP IP/OBS MODERATE 35: CPT | Performed by: STUDENT IN AN ORGANIZED HEALTH CARE EDUCATION/TRAINING PROGRAM

## 2020-11-29 PROCEDURE — 36415 COLL VENOUS BLD VENIPUNCTURE: CPT

## 2020-11-29 PROCEDURE — 700111 HCHG RX REV CODE 636 W/ 250 OVERRIDE (IP): Performed by: ORTHOPAEDIC SURGERY

## 2020-11-29 PROCEDURE — 97166 OT EVAL MOD COMPLEX 45 MIN: CPT

## 2020-11-29 PROCEDURE — A9270 NON-COVERED ITEM OR SERVICE: HCPCS | Performed by: STUDENT IN AN ORGANIZED HEALTH CARE EDUCATION/TRAINING PROGRAM

## 2020-11-29 PROCEDURE — 97161 PT EVAL LOW COMPLEX 20 MIN: CPT

## 2020-11-29 PROCEDURE — 770006 HCHG ROOM/CARE - MED/SURG/GYN SEMI*

## 2020-11-29 PROCEDURE — 80053 COMPREHEN METABOLIC PANEL: CPT

## 2020-11-29 PROCEDURE — 700102 HCHG RX REV CODE 250 W/ 637 OVERRIDE(OP): Performed by: ORTHOPAEDIC SURGERY

## 2020-11-29 PROCEDURE — 700105 HCHG RX REV CODE 258: Performed by: STUDENT IN AN ORGANIZED HEALTH CARE EDUCATION/TRAINING PROGRAM

## 2020-11-29 PROCEDURE — 85027 COMPLETE CBC AUTOMATED: CPT

## 2020-11-29 RX ADMIN — ACETAMINOPHEN 650 MG: 325 TABLET, FILM COATED ORAL at 16:55

## 2020-11-29 RX ADMIN — DOCUSATE SODIUM 50 MG AND SENNOSIDES 8.6 MG 2 TABLET: 8.6; 5 TABLET, FILM COATED ORAL at 05:37

## 2020-11-29 RX ADMIN — DEXTROSE AND SODIUM CHLORIDE: 5; 900 INJECTION, SOLUTION INTRAVENOUS at 10:00

## 2020-11-29 RX ADMIN — DEXTROSE AND SODIUM CHLORIDE: 5; 900 INJECTION, SOLUTION INTRAVENOUS at 19:31

## 2020-11-29 RX ADMIN — CEFAZOLIN SODIUM 1 G: 1 INJECTION, SOLUTION INTRAVENOUS at 05:37

## 2020-11-29 RX ADMIN — HYDROMORPHONE HYDROCHLORIDE 1 MG: 1 INJECTION, SOLUTION INTRAMUSCULAR; INTRAVENOUS; SUBCUTANEOUS at 00:51

## 2020-11-29 RX ADMIN — ASPIRIN 81 MG: 81 TABLET, COATED ORAL at 05:37

## 2020-11-29 RX ADMIN — DOCUSATE SODIUM 50 MG AND SENNOSIDES 8.6 MG 2 TABLET: 8.6; 5 TABLET, FILM COATED ORAL at 16:55

## 2020-11-29 RX ADMIN — ACETAMINOPHEN 650 MG: 325 TABLET, FILM COATED ORAL at 23:23

## 2020-11-29 RX ADMIN — CEFAZOLIN SODIUM 1 G: 1 INJECTION, SOLUTION INTRAVENOUS at 12:10

## 2020-11-29 RX ADMIN — ASPIRIN 81 MG: 81 TABLET, COATED ORAL at 16:55

## 2020-11-29 RX ADMIN — ACETAMINOPHEN 650 MG: 325 TABLET, FILM COATED ORAL at 12:12

## 2020-11-29 ASSESSMENT — COGNITIVE AND FUNCTIONAL STATUS - GENERAL
MOVING FROM LYING ON BACK TO SITTING ON SIDE OF FLAT BED: A LOT
WALKING IN HOSPITAL ROOM: A LITTLE
MOVING TO AND FROM BED TO CHAIR: UNABLE
MOBILITY SCORE: 13
HELP NEEDED FOR BATHING: A LOT
CLIMB 3 TO 5 STEPS WITH RAILING: A LOT
WALKING IN HOSPITAL ROOM: A LOT
TOILETING: A LITTLE
DRESSING REGULAR LOWER BODY CLOTHING: A LITTLE
MOVING FROM LYING ON BACK TO SITTING ON SIDE OF FLAT BED: A LOT
SUGGESTED CMS G CODE MODIFIER MOBILITY: CL
TURNING FROM BACK TO SIDE WHILE IN FLAT BAD: A LITTLE
STANDING UP FROM CHAIR USING ARMS: A LOT
DRESSING REGULAR LOWER BODY CLOTHING: A LOT
HELP NEEDED FOR BATHING: A LITTLE
DAILY ACTIVITIY SCORE: 22
DAILY ACTIVITIY SCORE: 17
DRESSING REGULAR UPPER BODY CLOTHING: A LITTLE
STANDING UP FROM CHAIR USING ARMS: A LITTLE
SUGGESTED CMS G CODE MODIFIER MOBILITY: CL
PERSONAL GROOMING: A LITTLE
SUGGESTED CMS G CODE MODIFIER DAILY ACTIVITY: CK
SUGGESTED CMS G CODE MODIFIER DAILY ACTIVITY: CJ
CLIMB 3 TO 5 STEPS WITH RAILING: A LOT
MOBILITY SCORE: 13
TURNING FROM BACK TO SIDE WHILE IN FLAT BAD: A LOT
MOVING TO AND FROM BED TO CHAIR: A LOT

## 2020-11-29 ASSESSMENT — ACTIVITIES OF DAILY LIVING (ADL): TOILETING: INDEPENDENT

## 2020-11-29 ASSESSMENT — GAIT ASSESSMENTS
ASSISTIVE DEVICE: FRONT WHEEL WALKER
GAIT LEVEL OF ASSIST: MINIMAL ASSIST
DEVIATION: ANTALGIC;STEP TO
DISTANCE (FEET): 8

## 2020-11-29 ASSESSMENT — PAIN DESCRIPTION - PAIN TYPE
TYPE: ACUTE PAIN;SURGICAL PAIN
TYPE: ACUTE PAIN;SURGICAL PAIN

## 2020-11-29 NOTE — THERAPY
Physical Therapy   Initial Evaluation     Patient Name: Erasto Calhoun Jr.  Age:  80 y.o., Sex:  male  Medical Record #: [id number]  Today's Date: 11/29/2020     Precautions: Fall Risk, Weight Bearing As Tolerated Right Lower Extremity, Posterior Hip Precautions    Assessment  Patient is 80 y.o. male admitted after GLF and now POD 1 right hip hemiarthroplasty. Per Dr Royal's note- WBAT with posterior hip precautions. Pt receptive to education on posterior hip precautions and WC status. Pt required mod assist with bed mobility due to pain. Pt requiring min assist with STS and ~8ft of ambulating with FWW. Pt ambulated with antalgic gait and required cues for safety as this was his first time up post surgery. Anticipate if pt is up for meals and ambulates another 2x today, pt will progress to home with home health. Currently recommending placement unless family can provide 24/7 assist. PT will cont while in acute care setting.    Plan    Recommend Physical Therapy 4 times per week until therapy goals are met for the following treatments:  Bed Mobility, Community Re-integration, Gait Training, Neuro Re-Education / Balance, Self Care/Home Evaluation, Stair Training, Therapeutic Activities and Therapeutic Exercises    DC Equipment Recommendations: Front-Wheel Walker  Discharge Recommendations: Recommend post-acute placement for additional physical therapy services prior to discharge home(anticipte pt will progress to home with home health)          11/29/20 1031   Vitals   O2 (LPM) 2   O2 Delivery Device Silicone Nasal Cannula   Prior Living Situation   Prior Services None   Housing / Facility 1 Story House   Steps Into Home 2   Steps In Home 0   Equipment Owned Single Point Cane   Lives with - Patient's Self Care Capacity Alone and Able to Care For Self   Comments pts daughter and son in law as well as 2 gradnchildren (teenagers) live nearby and can assist   Prior Level of Functional Mobility   Bed Mobility  Independent   Transfer Status Independent   Ambulation Independent   Distance Ambulation (Feet)   (community)   Assistive Devices Used None   Stairs Independent   Comments independent prior   Strength Lower Body   Lower Body Strength  X   Comments R LE not assessed due to pain   Gait Analysis   Gait Level Of Assist Minimal Assist   Assistive Device Front Wheel Walker   Distance (Feet) 8   # of Times Distance was Traveled 1   Deviation Antalgic;Step To   # of Stairs Climbed 0   Weight Bearing Status wbat R LE   Comments no LOB, pts first attempt at ambulating since surgery.    Bed Mobility    Supine to Sit Moderate Assist   Sit to Supine Minimal Assist   Scooting Minimal Assist   Functional Mobility   Sit to Stand Minimal Assist   Bed, Chair, Wheelchair Transfer Minimal Assist   Toilet Transfers Refused   Transfer Method Stand Step   Mobility bed mob, STS, ~8ft of ambulating, chair   Short Term Goals    Short Term Goal # 1 Pt will be able to complete bed mobility with SPV in 6tx in order to return home   Short Term Goal # 2 Pt will be able to complete functional transfers with FWW and SPV in 6tx in order to return home   Short Term Goal # 3 Pt will be able to ambulate 150ft with FWW and SPV in 6tx in order to return home   Short Term Goal # 4 Pt will be able to negotiate 2 steps with SPV in 6tx in order to enter and exit his home   Anticipated Discharge Equipment and Recommendations   DC Equipment Recommendations Front-Wheel Walker   Discharge Recommendations Recommend post-acute placement for additional physical therapy services prior to discharge home  (anticipte pt will progress to home with home health)

## 2020-11-29 NOTE — PROGRESS NOTES
No c/o  Right hip pain better but still some  AVSS  + DF/PF  SCDs on  XR OK  Hct 35.7    Plan:    WBAT RLE  Posterior hip precautions  DVT proph (SCDs, ASA 81 BID x 4 weeks)  PT/OT  D/c planning

## 2020-11-29 NOTE — THERAPY
Occupational Therapy   Initial Evaluation     Patient Name: Erasto Calhoun Jr.  Age:  80 y.o., Sex:  male  Medical Record #: [id number]  Today's Date: 11/29/2020     Precautions  Precautions: Posterior Hip Precautions, Weight Bearing As Tolerated Right Lower Extremity  Comments: MD ordered posterior hip precautions with pillow between legs in bed    Assessment  Patient is 80 y.o. male with a diagnosis of GLF,femoral neck fracture, s/p R hip hemiarthroplasty.  Additional factors influencing patient status / progress: good family support at home.      Plan    Recommend Occupational Therapy 2 times per week until therapy goals are met for the following treatments:  Adaptive Equipment, ADLS, transfers, safety.    DC Equipment Recommendations: (P) None  Discharge Recommendations: (P) Recommend post-acute placement for additional occupational therapy services prior to discharge home     Subjective    Pleasant and cooperative, motivated for home     Objective       11/29/20 1150   Total Time Spent   Total Time Spent (Mins) 40   Charge Group   OT Evaluation OT Evaluation Mod   Initial Contact Note    Initial Contact Note Order Received and Verified, Occupational Therapy Evaluation in Progress with Full Report to Follow.   Prior Living Situation   Prior Services None   Housing / Facility 1 Story House   Steps Into Home 2   Steps In Home 0   Bathroom Set up Walk In Shower;Bathtub / Shower Combination   Lives with - Patient's Self Care Capacity Alone and Able to Care For Self   Comments family live less than a block away ans can assist upon DC   Prior Level of ADL Function   Self Feeding Independent   Grooming / Hygiene Independent   Bathing Independent   Dressing Independent   Toileting Independent   Prior Level of IADL Function   Medication Management Independent   Laundry Independent   Kitchen Mobility Independent   Finances Independent   Home Management Independent   Shopping Independent   Prior Level Of Mobility  Independent Without Device in Home   Precautions   Precautions Posterior Hip Precautions;Weight Bearing As Tolerated Right Lower Extremity   Vitals   O2 (LPM) 2   O2 Delivery Device Silicone Nasal Cannula   Pain 0 - 10 Group   Therapist Pain Assessment Post Activity Pain Same as Prior to Activity;Nurse Notified;2   Cognition    Cognition / Consciousness WDL   Passive ROM Upper Body   Passive ROM Upper Body WDL   Active ROM Upper Body   Active ROM Upper Body  WDL   Dominant Hand Right   Strength Upper Body   Upper Body Strength  WDL   Sensation Upper Body   Upper Extremity Sensation  WDL   Upper Body Muscle Tone   Upper Body Muscle Tone  WDL   Coordination Upper Body   Coordination WDL   Balance Assessment   Sitting Balance (Static) Fair +   Sitting Balance (Dynamic) Fair   Standing Balance (Static) Fair   Standing Balance (Dynamic) Fair -   Weight Shift Sitting Good   Weight Shift Standing Fair   Bed Mobility    Supine to Sit Moderate Assist   Sit to Supine Minimal Assist   Scooting Minimal Assist   ADL Assessment   Eating Supervision   Grooming Supervision;Seated   Bathing Moderate Assist   Upper Body Dressing Supervision   Lower Body Dressing Moderate Assist   Toileting Minimal Assist   How much help from another person does the patient currently need...   Putting on and taking off regular lower body clothing? 2   Bathing (including washing, rinsing, and drying)? 2   Toileting, which includes using a toilet, bedpan, or urinal? 3   Putting on and taking off regular upper body clothing? 3   Taking care of personal grooming such as brushing teeth? 3   Eating meals? 4   6 Clicks Daily Activity Score 17   Functional Mobility   Sit to Stand Minimal Assist   Bed, Chair, Wheelchair Transfer Minimal Assist   Toilet Transfers Refused   Transfer Method Stand Pivot   Visual Perception   Visual Perception  WDL   Patient / Family Goals   Patient / Family Goal #1 return home   Short Term Goals   Short Term Goal # 1 set up for LB  dressing tasks, with good adherence to hip precautions/ use of AE   Short Term Goal # 2 supervised level for toileting tasks   Short Term Goal # 3 supervised level for necessary functional transfers   Education Group   Education Provided Hip Precautions   Role of Occupational Therapist Patient Response Patient;Acceptance;Explanation;Demonstration;Reinforcement Needed   Hip Precautions Patient Response Patient;Acceptance;Explanation;Demonstration;Verbal Demonstration;Reinforcement Needed   Problem List   Problem List Decreased Active Daily Living Skills;Decreased Functional Mobility;Decreased Activity Tolerance   Anticipated Discharge Equipment and Recommendations   DC Equipment Recommendations None   Discharge Recommendations Recommend post-acute placement for additional occupational therapy services prior to discharge home   Interdisciplinary Plan of Care Collaboration   IDT Collaboration with  Nursing;Physical Therapist   Patient Position at End of Therapy Seated;Call Light within Reach;Tray Table within Reach;Phone within Reach   Collaboration Comments report given   Session Information   Date / Session Number  11/29,1( 1/2, 12/5)   Priority 2

## 2020-11-29 NOTE — PROGRESS NOTES
Pt aaox4. Pt c/o R hip pain 3/10, tylenol given PRN with +results. +BS. Voiding w/o difficulty. WBAT to RLE, 2+ pulses, denies N/T, skin warm, cap refill < 3 sec.  Dressing CDI. Reviewed poc with pt-verbalized understanding. PT/OT in to work with pt- up to chair. Call light in reach.

## 2020-11-29 NOTE — CARE PLAN
Problem: Communication  Goal: The ability to communicate needs accurately and effectively will improve  Outcome: PROGRESSING AS EXPECTED   Pt uses call bell to express needs. All pain, comfort, and toileting needs met at this time.   Problem: Safety  Goal: Will remain free from injury  Outcome: PROGRESSING AS EXPECTED   Bed locked and in lowest position. Pt educated on calling for assistance when wanting to get up. Call bell and belongings within reach.

## 2020-11-29 NOTE — PROGRESS NOTES
Hospital Medicine Daily Progress Note    Date of Service  11/29/2020    Chief Complaint  80 y.o. male admitted 11/27/2020 with right hip pain post mechanical fall     Hospital Course  An 80-year-old man with h/o CVA presented with right hip pain post mechanical fall. He tripped over his 2 dogs and fell.  XR showed impacted right femoral neck fracture. COVID 19 negative. CXR showed no acute cardiopulmonary findings. Patient underwent right hip hemiarthroplasty on 11/28/2020.    Interval Problem Update  Reports pain at right hip, controlled with medications. Afebrile, hemodynamically stable. On weakness, no sensory deficit on b/l LE.  Ortho following, recommendations appreciated.  WBAT RLE    Consultants/Specialty  Orthopedic surgery    Code Status  Full Code    Disposition  PT, OT recommended postacute placement    Review of Systems  Review of Systems   Constitutional: Negative for chills and fever.   HENT: Negative.    Eyes: Negative.    Respiratory: Positive for cough. Negative for shortness of breath.    Cardiovascular: Negative for chest pain and leg swelling.   Gastrointestinal: Negative for abdominal pain and vomiting.   Genitourinary: Negative for dysuria.   Musculoskeletal: Positive for falls and joint pain (right hip).   Skin: Negative for rash.   Neurological: Negative for focal weakness, seizures and headaches.     Physical Exam  Temp:  [35.9 °C (96.6 °F)-37.1 °C (98.8 °F)] 37.1 °C (98.8 °F)  Pulse:  [58-96] 96  Resp:  [15-16] 16  BP: (107-143)/(66-78) 107/71  SpO2:  [91 %-97 %] 91 %    Physical Exam  Vitals signs and nursing note reviewed.   Constitutional:       Appearance: Normal appearance.   HENT:      Head: Normocephalic.      Nose: Nose normal.      Mouth/Throat:      Mouth: Mucous membranes are moist.      Pharynx: Oropharynx is clear.   Eyes:      Pupils: Pupils are equal, round, and reactive to light.   Neck:      Musculoskeletal: Normal range of motion.   Cardiovascular:      Rate and Rhythm:  Normal rate and regular rhythm.      Heart sounds: No murmur.   Pulmonary:      Effort: Pulmonary effort is normal. No respiratory distress.   Abdominal:      General: Abdomen is flat. Bowel sounds are normal.      Tenderness: There is no abdominal tenderness.   Musculoskeletal: Normal range of motion.         General: Tenderness (right hip) present. Dressing clear.  Neurological:      General: No focal deficit present.      Mental Status: He is alert and oriented to person, place, and time.      Cranial Nerves: No cranial nerve deficit.      Motor: No weakness.       Fluids    Intake/Output Summary (Last 24 hours) at 11/29/2020 1448  Last data filed at 11/29/2020 0700  Gross per 24 hour   Intake --   Output 650 ml   Net -650 ml       Laboratory  Recent Labs     11/27/20 2254 11/28/20 1809 11/29/20  0631   WBC 9.6 13.4* 10.8   RBC 3.80* 3.81* 3.42*   HEMOGLOBIN 12.6* 12.3* 11.4*   HEMATOCRIT 38.4* 40.3* 35.7*   .1* 105.8* 104.4*   MCH 33.2* 32.3 33.3*   MCHC 32.8* 30.5* 31.9*   RDW 51.6* 55.0* 54.6*   PLATELETCT 195 204 179   MPV 10.6 11.1 10.6     Recent Labs     11/27/20 2254 11/28/20 1809 11/29/20  0631   SODIUM 138 134* 137   POTASSIUM 4.4 3.9 3.7   CHLORIDE 108 104 108   CO2 22 23 24   GLUCOSE 118* 132* 127*   BUN 31* 27* 24*   CREATININE 1.10 0.87 0.83   CALCIUM 9.1 8.8 8.3*     Recent Labs     11/27/20 2254   APTT 26.6   INR 1.08         Recent Labs     11/28/20  1809   TRIGLYCERIDE 86   HDL 45   LDL 60       Imaging  DX-PELVIS-1 OR 2 VIEWS   Final Result      Right hip arthroplasty appears within normal limits      DX-CHEST-PORTABLE (1 VIEW)   Final Result      No acute cardiopulmonary findings.      DX-PELVIS-1 OR 2 VIEWS   Final Result      Impacted right femoral neck fracture      DX-FEMUR-2+ RIGHT   Final Result      Impacted right femoral neck fracture           Assessment/Plan  * Femoral neck fracture (HCC)  Assessment & Plan  X-ray confirms right femoral neck fracture  Patient underwent  right hip hemiarthroplasty on 11/28/2020  Orthopedics following, recommendations appreciated  D5  mL/h  Dilaudid 1 mg q4h prn   Tylenol prn  WBAT RLE  DVT prophylaxis: SCD+aspirin  PT, OT recommended post-acute placement    History of CVA (cerebrovascular accident)- (present on admission)  Assessment & Plan  Continue asa 81 mg po daily after OR     Dyslipidemia- (present on admission)  Assessment & Plan  Continue lipitor 80 mg po qhs       VTE prophylaxis: SCD + aspirin 81 mg bid x 4 weeks

## 2020-11-30 LAB
ALBUMIN SERPL BCP-MCNC: 3.1 G/DL (ref 3.2–4.9)
ALBUMIN/GLOB SERPL: 1.3 G/DL
ALP SERPL-CCNC: 62 U/L (ref 30–99)
ALT SERPL-CCNC: 17 U/L (ref 2–50)
ANION GAP SERPL CALC-SCNC: 6 MMOL/L (ref 7–16)
AST SERPL-CCNC: 25 U/L (ref 12–45)
BASOPHILS # BLD AUTO: 0.2 % (ref 0–1.8)
BASOPHILS # BLD: 0.02 K/UL (ref 0–0.12)
BILIRUB SERPL-MCNC: 2.1 MG/DL (ref 0.1–1.5)
BUN SERPL-MCNC: 24 MG/DL (ref 8–22)
CALCIUM SERPL-MCNC: 8.5 MG/DL (ref 8.5–10.5)
CHLORIDE SERPL-SCNC: 104 MMOL/L (ref 96–112)
CO2 SERPL-SCNC: 24 MMOL/L (ref 20–33)
CREAT SERPL-MCNC: 0.82 MG/DL (ref 0.5–1.4)
EOSINOPHIL # BLD AUTO: 0.01 K/UL (ref 0–0.51)
EOSINOPHIL NFR BLD: 0.1 % (ref 0–6.9)
ERYTHROCYTE [DISTWIDTH] IN BLOOD BY AUTOMATED COUNT: 53.9 FL (ref 35.9–50)
GLOBULIN SER CALC-MCNC: 2.4 G/DL (ref 1.9–3.5)
GLUCOSE SERPL-MCNC: 135 MG/DL (ref 65–99)
HCT VFR BLD AUTO: 34.8 % (ref 42–52)
HGB BLD-MCNC: 11.2 G/DL (ref 14–18)
IMM GRANULOCYTES # BLD AUTO: 0.04 K/UL (ref 0–0.11)
IMM GRANULOCYTES NFR BLD AUTO: 0.3 % (ref 0–0.9)
LYMPHOCYTES # BLD AUTO: 2.91 K/UL (ref 1–4.8)
LYMPHOCYTES NFR BLD: 22.4 % (ref 22–41)
MCH RBC QN AUTO: 33.4 PG (ref 27–33)
MCHC RBC AUTO-ENTMCNC: 32.2 G/DL (ref 33.7–35.3)
MCV RBC AUTO: 103.9 FL (ref 81.4–97.8)
MONOCYTES # BLD AUTO: 1.67 K/UL (ref 0–0.85)
MONOCYTES NFR BLD AUTO: 12.8 % (ref 0–13.4)
NEUTROPHILS # BLD AUTO: 8.37 K/UL (ref 1.82–7.42)
NEUTROPHILS NFR BLD: 64.2 % (ref 44–72)
NRBC # BLD AUTO: 0 K/UL
NRBC BLD-RTO: 0 /100 WBC
PLATELET # BLD AUTO: 172 K/UL (ref 164–446)
PMV BLD AUTO: 11.1 FL (ref 9–12.9)
POTASSIUM SERPL-SCNC: 3.6 MMOL/L (ref 3.6–5.5)
PROT SERPL-MCNC: 5.5 G/DL (ref 6–8.2)
RBC # BLD AUTO: 3.35 M/UL (ref 4.7–6.1)
SODIUM SERPL-SCNC: 134 MMOL/L (ref 135–145)
WBC # BLD AUTO: 13 K/UL (ref 4.8–10.8)

## 2020-11-30 PROCEDURE — 36415 COLL VENOUS BLD VENIPUNCTURE: CPT

## 2020-11-30 PROCEDURE — 700102 HCHG RX REV CODE 250 W/ 637 OVERRIDE(OP): Performed by: ORTHOPAEDIC SURGERY

## 2020-11-30 PROCEDURE — A9270 NON-COVERED ITEM OR SERVICE: HCPCS | Performed by: ORTHOPAEDIC SURGERY

## 2020-11-30 PROCEDURE — 770006 HCHG ROOM/CARE - MED/SURG/GYN SEMI*

## 2020-11-30 PROCEDURE — 99232 SBSQ HOSP IP/OBS MODERATE 35: CPT | Performed by: STUDENT IN AN ORGANIZED HEALTH CARE EDUCATION/TRAINING PROGRAM

## 2020-11-30 PROCEDURE — A9270 NON-COVERED ITEM OR SERVICE: HCPCS | Performed by: STUDENT IN AN ORGANIZED HEALTH CARE EDUCATION/TRAINING PROGRAM

## 2020-11-30 PROCEDURE — 80053 COMPREHEN METABOLIC PANEL: CPT

## 2020-11-30 PROCEDURE — 700102 HCHG RX REV CODE 250 W/ 637 OVERRIDE(OP): Performed by: STUDENT IN AN ORGANIZED HEALTH CARE EDUCATION/TRAINING PROGRAM

## 2020-11-30 PROCEDURE — 85025 COMPLETE CBC W/AUTO DIFF WBC: CPT

## 2020-11-30 RX ADMIN — DOCUSATE SODIUM 50 MG AND SENNOSIDES 8.6 MG 2 TABLET: 8.6; 5 TABLET, FILM COATED ORAL at 06:19

## 2020-11-30 RX ADMIN — ASPIRIN 81 MG: 81 TABLET, COATED ORAL at 06:19

## 2020-11-30 RX ADMIN — ACETAMINOPHEN 650 MG: 325 TABLET, FILM COATED ORAL at 16:31

## 2020-11-30 RX ADMIN — ASPIRIN 81 MG: 81 TABLET, COATED ORAL at 16:31

## 2020-11-30 RX ADMIN — DOCUSATE SODIUM 50 MG AND SENNOSIDES 8.6 MG 2 TABLET: 8.6; 5 TABLET, FILM COATED ORAL at 16:31

## 2020-11-30 RX ADMIN — ACETAMINOPHEN 650 MG: 325 TABLET, FILM COATED ORAL at 22:29

## 2020-11-30 RX ADMIN — ACETAMINOPHEN 650 MG: 325 TABLET, FILM COATED ORAL at 09:51

## 2020-11-30 ASSESSMENT — PAIN DESCRIPTION - PAIN TYPE
TYPE: SURGICAL PAIN
TYPE: ACUTE PAIN;SURGICAL PAIN

## 2020-11-30 NOTE — PROGRESS NOTES
"   Orthopaedic PA Progress Note    Interval changes:R elbow wound, new.    ROS - Patient denies any new issues. No chest pain, dyspnea, or fever.  Pain well controlled.    /71   Pulse 84   Temp 36.7 °C (98.1 °F) (Temporal)   Resp 16   Ht 1.803 m (5' 11\")   Wt 75.8 kg (167 lb)   SpO2 94%     Patient seen and examined  No acute distress  Breathing non labored  RRR  Surgical dressing is clean, dry, and intact. Patient clearly fires tibialis anterior, EHL, and gastrocnemius/soleus. Sensation is intact to light touch throughout superficial peroneal, deep peroneal, tibial, saphenous, and sural nerve distributions. Strong and palpable 2+ dorsalis pedis and posterior tibial pulses with capillary refill less than 2 seconds. No lower leg tenderness or discomfort.    R elbow dressing CDI              -EPL/FPL intact              -SILT M/U/R/AIN/PIN/Msc/Ax nerves              -+WF/WE/EDC//IO/terminal digit flex/ext              -compartments soft and compressible              -pulses palpable, brisk capillary refill    Recent Labs     11/28/20  1809 11/29/20  0631 11/30/20  0921   WBC 13.4* 10.8 13.0*   RBC 3.81* 3.42* 3.35*   HEMOGLOBIN 12.3* 11.4* 11.2*   HEMATOCRIT 40.3* 35.7* 34.8*   .8* 104.4* 103.9*   MCH 32.3 33.3* 33.4*   MCHC 30.5* 31.9* 32.2*   RDW 55.0* 54.6* 53.9*   PLATELETCT 204 179 172   MPV 11.1 10.6 11.1     Active Hospital Problems    Diagnosis   • History of CVA (cerebrovascular accident) [Z86.73]     Priority: High   • Femoral neck fracture (HCC) [S72.009A]   • Dyslipidemia [E78.5]       Assessment/Plan:  POD#2 S/P R hip juan carlos  Wt bearing status - AT  PT/OT-initiated  Wound care:dressing changes QOD and PRN if soaks by RN. Elbow per wound care  Drains - no  Anthony-no  Sutures/Staples out- 10-14 days post operatively  Antibiotics: complete  DVT Prophylaxis- TEDS/SCDs/Foot pumps.   ECASA 81 mg daily  Future Procedures - none planned  Case Coordination for Discharge Planning - Disposition per " Med/PT/OT  Follow-Up: needs appointment with Dr. Royal at  [facility] at 10-14 days post-op for re-evaluation, staple removal and radiographs.

## 2020-11-30 NOTE — PROGRESS NOTES
[facility] Medicine Daily Progress Note    Date of Service  11/30/2020    Chief Complaint  80 y.o. male admitted 11/27/2020 with right hip pain post mechanical fall     Hospital Course  An 80-year-old man with h/o CVA presented with right hip pain post mechanical fall. He tripped over his 2 dogs and fell.  XR showed impacted right femoral neck fracture. COVID 19 negative. CXR showed no acute cardiopulmonary findings. Patient underwent right hip hemiarthroplasty on 11/28/2020.    Interval Problem Update  Reports right hip pain on ambulation. Otherwise no complaints. Afebrile, hemodynamically stable.  WBAT RLE  Referral to PMR placed    Consultants/Specialty  Orthopedic surgery    Code Status  Full Code    Disposition  PT, OT recommended post-acute placement    Review of Systems  Review of Systems   Constitutional: Negative for chills and fever.   HENT: Negative.    Eyes: Negative.    Respiratory: Positive for cough. Negative for shortness of breath.    Cardiovascular: Negative for chest pain and leg swelling.   Gastrointestinal: Negative for abdominal pain and vomiting.   Genitourinary: Negative for dysuria.   Musculoskeletal: Positive for falls and joint pain (right hip).   Skin: Negative for rash.   Neurological: Negative for focal weakness, seizures and headaches.     Physical Exam  Temp:  [36.5 °C (97.7 °F)-37.4 °C (99.3 °F)] 36.5 °C (97.7 °F)  Pulse:  [] 100  Resp:  [16-17] 17  BP: (106-137)/(63-72) 127/69  SpO2:  [92 %-96 %] 92 %    Physical Exam  Vitals signs and nursing note reviewed.   Constitutional:       Appearance: Normal appearance.   HENT:      Head: Normocephalic.      Nose: Nose normal.      Mouth/Throat:      Mouth: Mucous membranes are moist.      Pharynx: Oropharynx is clear.   Eyes:      Pupils: Pupils are equal, round, and reactive to light.   Neck:      Musculoskeletal: Normal range of motion.   Cardiovascular:      Rate and Rhythm: Normal rate and regular rhythm.      Heart sounds: No  murmur.   Pulmonary:      Effort: Pulmonary effort is normal. No respiratory distress.   Abdominal:      General: Abdomen is flat. Bowel sounds are normal.      Tenderness: There is no abdominal tenderness.   Musculoskeletal: Normal range of motion.         General: Tenderness (right hip) present. Dressing clear.  Neurological:      General: No focal deficit present.      Mental Status: He is alert and oriented to person, place, and time.      Cranial Nerves: No cranial nerve deficit.      Motor: No weakness.     Fluids    Intake/Output Summary (Last 24 hours) at 11/30/2020 1536  Last data filed at 11/30/2020 1300  Gross per 24 hour   Intake 1680 ml   Output 1300 ml   Net 380 ml       Laboratory  Recent Labs     11/28/20  1809 11/29/20  0631 11/30/20  0921   WBC 13.4* 10.8 13.0*   RBC 3.81* 3.42* 3.35*   HEMOGLOBIN 12.3* 11.4* 11.2*   HEMATOCRIT 40.3* 35.7* 34.8*   .8* 104.4* 103.9*   MCH 32.3 33.3* 33.4*   MCHC 30.5* 31.9* 32.2*   RDW 55.0* 54.6* 53.9*   PLATELETCT 204 179 172   MPV 11.1 10.6 11.1     Recent Labs     11/28/20  1809 11/29/20  0631 11/30/20  0921   SODIUM 134* 137 134*   POTASSIUM 3.9 3.7 3.6   CHLORIDE 104 108 104   CO2 23 24 24   GLUCOSE 132* 127* 135*   BUN 27* 24* 24*   CREATININE 0.87 0.83 0.82   CALCIUM 8.8 8.3* 8.5     Recent Labs     11/27/20  2254   APTT 26.6   INR 1.08         Recent Labs     11/28/20  1809   TRIGLYCERIDE 86   HDL 45   LDL 60       Imaging  DX-PELVIS-1 OR 2 VIEWS   Final Result      Right hip arthroplasty appears within normal limits      DX-CHEST-PORTABLE (1 VIEW)   Final Result      No acute cardiopulmonary findings.      DX-PELVIS-1 OR 2 VIEWS   Final Result      Impacted right femoral neck fracture      DX-FEMUR-2+ RIGHT   Final Result      Impacted right femoral neck fracture           Assessment/Plan  * Femoral neck fracture (HCC)  Assessment & Plan  X-ray confirms right femoral neck fracture  Patient underwent right hip hemiarthroplasty on  11/28/2020  Orthopedics following, recommendations appreciated  D5  mL/h  Dilaudid 1 mg q4h prn   Tylenol prn  WBAT RLE  DVT prophylaxis: SCD+aspirin  PT, OT recommended post-acute placement    History of CVA (cerebrovascular accident)- (present on admission)  Assessment & Plan  Continue asa 81 mg po daily after OR     Dyslipidemia- (present on admission)  Assessment & Plan  Continue lipitor 80 mg po qhs         VTE prophylaxis: SCD + aspirin 81 mg bid x 4 weeks

## 2020-11-30 NOTE — PROGRESS NOTES
0718 Received report from night TIAGO Amaya, POC discussed. Call light in place, bed lowered and locked, bed alarm on. Encourage pt to call if needed anything. Pt A&Ox4, RA, dressing on RLE is CD&I    1030 wound on Right elbow, draining blood, looks deep, placed adaptic, 4x4, rolled gauze per wound TIAGO Betancourt, wound consult placed

## 2020-11-30 NOTE — DISCHARGE PLANNING
GLF - right femoral neck fracture, closed, displaced.  S/P right hip hemiarthroplasty.    Ongoing medical management as well as therapy need.  Anticipate post acute services to facilitate a successful transition to community.  Discharge support daughter.  Please consider a PMR referral to assist with plan of care.

## 2020-12-01 ENCOUNTER — HOME HEALTH ADMISSION (OUTPATIENT)
Dept: HOME HEALTH SERVICES | Facility: HOME HEALTHCARE | Age: 80
End: 2020-12-01
Payer: MEDICARE

## 2020-12-01 VITALS
HEART RATE: 79 BPM | SYSTOLIC BLOOD PRESSURE: 135 MMHG | HEIGHT: 71 IN | WEIGHT: 167 LBS | OXYGEN SATURATION: 93 % | BODY MASS INDEX: 23.38 KG/M2 | DIASTOLIC BLOOD PRESSURE: 69 MMHG | TEMPERATURE: 97.8 F | RESPIRATION RATE: 17 BRPM

## 2020-12-01 PROBLEM — S72.009A FEMORAL NECK FRACTURE (HCC): Status: RESOLVED | Noted: 2020-11-28 | Resolved: 2020-12-01

## 2020-12-01 PROCEDURE — 700102 HCHG RX REV CODE 250 W/ 637 OVERRIDE(OP): Performed by: ORTHOPAEDIC SURGERY

## 2020-12-01 PROCEDURE — A9270 NON-COVERED ITEM OR SERVICE: HCPCS | Performed by: ORTHOPAEDIC SURGERY

## 2020-12-01 PROCEDURE — 700102 HCHG RX REV CODE 250 W/ 637 OVERRIDE(OP): Performed by: STUDENT IN AN ORGANIZED HEALTH CARE EDUCATION/TRAINING PROGRAM

## 2020-12-01 PROCEDURE — 97110 THERAPEUTIC EXERCISES: CPT

## 2020-12-01 PROCEDURE — 97535 SELF CARE MNGMENT TRAINING: CPT

## 2020-12-01 PROCEDURE — 97530 THERAPEUTIC ACTIVITIES: CPT

## 2020-12-01 PROCEDURE — 97116 GAIT TRAINING THERAPY: CPT

## 2020-12-01 PROCEDURE — A9270 NON-COVERED ITEM OR SERVICE: HCPCS | Performed by: STUDENT IN AN ORGANIZED HEALTH CARE EDUCATION/TRAINING PROGRAM

## 2020-12-01 PROCEDURE — 99239 HOSP IP/OBS DSCHRG MGMT >30: CPT | Performed by: STUDENT IN AN ORGANIZED HEALTH CARE EDUCATION/TRAINING PROGRAM

## 2020-12-01 RX ORDER — ACETAMINOPHEN 325 MG/1
650 TABLET ORAL EVERY 6 HOURS PRN
Qty: 40 TAB | Refills: 0 | Status: SHIPPED | OUTPATIENT
Start: 2020-12-01 | End: 2020-12-11

## 2020-12-01 RX ORDER — ASPIRIN 81 MG/1
81 TABLET ORAL 2 TIMES DAILY
Qty: 56 TAB | Refills: 0 | Status: SHIPPED | OUTPATIENT
Start: 2020-12-01 | End: 2020-12-29

## 2020-12-01 RX ADMIN — ACETAMINOPHEN 650 MG: 325 TABLET, FILM COATED ORAL at 06:15

## 2020-12-01 RX ADMIN — DOCUSATE SODIUM 50 MG AND SENNOSIDES 8.6 MG 2 TABLET: 8.6; 5 TABLET, FILM COATED ORAL at 06:15

## 2020-12-01 RX ADMIN — ACETAMINOPHEN 650 MG: 325 TABLET, FILM COATED ORAL at 16:17

## 2020-12-01 RX ADMIN — ASPIRIN 81 MG: 81 TABLET, COATED ORAL at 06:16

## 2020-12-01 ASSESSMENT — GAIT ASSESSMENTS
DISTANCE (FEET): 200
GAIT LEVEL OF ASSIST: SUPERVISED
ASSISTIVE DEVICE: FRONT WHEEL WALKER
DEVIATION: ANTALGIC;STEP TO

## 2020-12-01 ASSESSMENT — COGNITIVE AND FUNCTIONAL STATUS - GENERAL
HELP NEEDED FOR BATHING: A LOT
DRESSING REGULAR UPPER BODY CLOTHING: A LITTLE
DAILY ACTIVITIY SCORE: 18
DRESSING REGULAR LOWER BODY CLOTHING: A LOT
SUGGESTED CMS G CODE MODIFIER DAILY ACTIVITY: CK
TOILETING: A LITTLE

## 2020-12-01 ASSESSMENT — PAIN DESCRIPTION - PAIN TYPE: TYPE: SURGICAL PAIN

## 2020-12-01 NOTE — DISCHARGE PLANNING
Anticipated Discharge Disposition: Home with [facility]     Action: Spoke with bedside RN Melony and she stated that the patient does not need a walker because his daughter is bringing him one.    Choice form with [facility] as #1 choice faxed to Jennifer MONCADA at [phone].    Barriers to Discharge:  acceptance    Plan: Will follow up with MARKIE

## 2020-12-01 NOTE — CONSULTS
Physical Medicine and Rehabilitation Consultation         Chart review consult      Date of initial consultation: 12/1/2020  Consulting provider: Chelo Coleman MD  Reason for consultation: assess for acute inpatient rehab appropriateness  LOS: 3 Day(s)    Chief complaint: Right hip hemiarthroplasty    HPI: The patient is a 80 y.o. male with a past medical history of stroke in June 2020 hyperlipidemia, lupus, cataracts;  who presented on 11/27/2020 10:19 PM with right hip pain status post mechanical ground-level fall.  Patient was trying to reach muffins out of the freezer when he tripped and fell over his 2 dogs that he did not see.  Patient was evaluated by orthopedic surgery who found right femoral neck fracture and took him to the OR for right hip hemiarthroplasty    Social Hx:  1 SH  2 MYRNA  With: Alone and able to care for self, family lives less than a block away and can assist on DC per notes    Employment: Retired  Tobacco: Denies  Alcohol: Social drinker  Drugs: Denies    THERAPY:  Restrictions: Posterior hip precautions, WBAT right lower extremity  PT: Functional mobility   1129: Min assist for transfers.  Walking 8 feet with front wheel walker at min assist level    OT: ADLs  11/29: Mod assist for lower body dressing and bathing    SLP:   None    IMAGING:  Pelvic XR 11/27/2020  Impacted right femoral neck fracture    PROCEDURES:  11/28/2020  SURGICAL PROCEDURES:  Right hip hemiarthroplasty.  SURGEON:  Kendrick Royal MD    PMH:  Past Medical History:   Diagnosis Date   • Cataract 01/22/2020    bilateral   • Dental disorder 01/22/2020    upper and lower dentures   • High cholesterol 01/22/2020    on med   • Hyperlipidemia 1/9/2017    Pravastin x 10 yrs    • Lupus (systemic lupus erythematosus) (HCC)     Pt reports only symptom was rash, on plaquenil for several yrs, stopped 2004       PSH:  Past Surgical History:   Procedure Laterality Date   • PB PARTIAL HIP REPLACEMENT Right 11/28/2020     Procedure: HEMIARTHROPLASTY, HIP;  Surgeon: Kendrick Royal M.D.;  Location: SURGERY [facility];  Service: Orthopedics   • CATARACT PHACO WITH IOL Left 2/11/2020    Procedure: PHACOEMULSIFICATION, CATARACT, WITH IOL INSERTION;  Surgeon: Kiran Jang M.D.;  Location: SURGERY SAME DAY [facility];  Service: Ophthalmology   • CATARACT PHACO WITH IOL Right 1/28/2020    Procedure: PHACOEMULSIFICATION, CATARACT, WITH IOL INSERTION;  Surgeon: Kiran Jang M.D.;  Location: SURGERY SAME DAY [facility];  Service: Ophthalmology   • OTHER SURGICAL PROCEDURE Right 1972    right heel injury   • CAST APPLICATION      climbing, collar bone injury (7th grade)   • OPEN REDUCTION         FHX:  Family History   Problem Relation Age of Onset   • Cancer Mother         skin cancer   • Stroke Mother 92   • Diabetes Mother    • Hypertension Father    • Heart Disease Father 55        father passed from heart attack   • No Known Problems Brother    • No Known Problems Brother    • Cancer Brother         throat ca   • Alcohol/Drug Brother         heavy drinker       Medications:  Current Facility-Administered Medications   Medication Dose   • senna-docusate (PERICOLACE or SENOKOT S) 8.6-50 MG per tablet 2 Tab  2 Tab    And   • polyethylene glycol/lytes (MIRALAX) PACKET 1 Packet  1 Packet    And   • magnesium hydroxide (MILK OF MAGNESIA) suspension 30 mL  30 mL    And   • bisacodyl (DULCOLAX) suppository 10 mg  10 mg   • HYDROmorphone pf (DILAUDID) injection 1 mg  1 mg   • acetaminophen (Tylenol) tablet 650 mg  650 mg   • aspirin EC (ECOTRIN) tablet 81 mg  81 mg       Allergies:  No Known Allergies    Labs: Reviewed and significant for   Recent Labs     11/28/20  1809 11/29/20  0631 11/30/20  0921   RBC 3.81* 3.42* 3.35*   HEMOGLOBIN 12.3* 11.4* 11.2*   HEMATOCRIT 40.3* 35.7* 34.8*   PLATELETCT 204 179 172     Recent Labs     11/28/20  1809 11/29/20  0631 11/30/20  0921   SODIUM 134* 137 134*   POTASSIUM 3.9 3.7 3.6   CHLORIDE  104 108 104   CO2 23 24 24   GLUCOSE 132* 127* 135*   BUN 27* 24* 24*   CREATININE 0.87 0.83 0.82   CALCIUM 8.8 8.3* 8.5     No results found for this or any previous visit (from the past 24 hour(s)).      ASSESSMENT:  Patient is a 80 y.o. male admitted with right hip fracture after mechanical ground-level fall now s/p right hip hemiarthroplasty    [facility] Code / Diagnosis to Support: 0008.11 - Orthopaedic Disorders: Status Post Unilateral Hip Fracture    Rehabilitation: Impaired ADLs and mobility  Patient is a good candidate for inpatient rehab based on needs for PT, OT.  Patient will also benefit from family training.  Patient has a good discharge situation which will be home with family support.     Barriers to transfer include: Insurance authorization, TCCs to verify disposition, medical clearance and bed availability     Additional Recommendations:  -Patient is a candidate for IPR, however therapy notes reflect strong possibility that he will be able to discharge directly home with home health and family support.   - Would appreciate repeat PT evaluation to determine if IPR level of care is still required  -Will need updated Covid reflecting negative result within 48 hours of admission to IPR should he continue to require therapy  -Continue PT OT while in house  -TCC is to confirm discharge support  -Pain control per primary team    Medical Complexity:  Leukocytosis  Slight anemia      DVT PPX: SCDs      Thank you for allowing us to participate in the care of this patient.     Randell Castillo, DO   Physical Medicine and Rehabilitation

## 2020-12-01 NOTE — DISCHARGE PLANNING
[facility] Transitional Care Coordination     Referral from:  Dr. Coleman    Facesheet indicates: SCP    Potential Rehab Diagnosis: R Hip fx    Chart review indicates patient may have on going medical management and may have therapy needs to possibly meet inpatient rehab facility criteria with the goal of returning to community.    D/C support: TBD     Physiatry consultation forwarded per protocol.      GLF resulting in a  Right femoral neck fracture, closed, displaced requiring Right hip hemiarthroplasty.  Weight Bearing As Tolerated Right Lower Extremity, Posterior Hip Precautions.  Physiatry to consult.  Waiting on additional information to determine appropriateness for acute inpatient rehabilitation. Will continue to follow.     Last Covid test date: 11-28-20 negative    Thank you for the referral.

## 2020-12-01 NOTE — PROGRESS NOTES
"   Orthopaedic PA Progress Note    Interval changes:did well overnight    ROS - Patient denies any new issues. No chest pain, dyspnea, or fever.  Pain well controlled.    /67   Pulse 89   Temp 36.7 °C (98.1 °F) (Temporal)   Resp 17   Ht 1.803 m (5' 11\")   Wt 75.8 kg (167 lb)   SpO2 91%     Patient seen and examined  No acute distress  Breathing non labored  RRR  Surgical dressing is clean, dry, and intact. Patient clearly fires tibialis anterior, EHL, and gastrocnemius/soleus. Sensation is intact to light touch throughout superficial peroneal, deep peroneal, tibial, saphenous, and sural nerve distributions. Strong and palpable 2+ dorsalis pedis and posterior tibial pulses with capillary refill less than 2 seconds. No lower leg tenderness or discomfort.    R elbow dressing CDI              -EPL/FPL intact              -SILT M/U/R/AIN/PIN/Msc/Ax nerves              -+WF/WE/EDC//IO/terminal digit flex/ext              -compartments soft and compressible              -pulses palpable, brisk capillary refill    Recent Labs     11/28/20  1809 11/29/20  0631 11/30/20  0921   WBC 13.4* 10.8 13.0*   RBC 3.81* 3.42* 3.35*   HEMOGLOBIN 12.3* 11.4* 11.2*   HEMATOCRIT 40.3* 35.7* 34.8*   .8* 104.4* 103.9*   MCH 32.3 33.3* 33.4*   MCHC 30.5* 31.9* 32.2*   RDW 55.0* 54.6* 53.9*   PLATELETCT 204 179 172   MPV 11.1 10.6 11.1     Active Hospital Problems    Diagnosis   • History of CVA (cerebrovascular accident) [Z86.73]     Priority: High   • Femoral neck fracture (HCC) [S72.009A]   • Dyslipidemia [E78.5]       Assessment/Plan:  POD#3 S/P R hip juan carlos  Wt bearing status - AT  PT/OT-initiated  Wound care:dressing left in place,  May shower p POD#5  Drains - no  Anthony-no  Sutures/Staples out- 10-14 days post operatively  Antibiotics: complete  DVT Prophylaxis- TEDS/SCDs/Foot pumps.   ECASA 81 mg daily  Future Procedures - none planned  Case Coordination for Discharge Planning - Disposition per Med/PT/OT  Follow-Up: " needs appointment with Dr. Royal at  [facility] at 10-14 days post-op for re-evaluation, staple removal and radiographs.

## 2020-12-01 NOTE — PROGRESS NOTES
Received report from NOC RN.   Pt A & O x4, very cheerful, denies pain, N/V/ SOB or cough.   Pt able to teach back hip precautions, currently up in chair, hip precaution handout given for patient to review.  Able to mobilize independently. Walking in halls with walker.     Hip dressing observed to be CDI. Some mild redness and bruising noted above dressing.     Pt tolerating diet. BM today. Voiding appropriately.     Wound RN rounding on patient for elbow injury.   Ot has rounded on patient.       Pt reports lots of help at home and has already had assistance in modifying his home environment for safety and fall precautions.       A video was recorded of wound dressing change for patient to use as reference while at home.     Pt able to make needs known, all  Needs met at this time. Calls appropriately.

## 2020-12-01 NOTE — WOUND TEAM
[facility]  Inpatient Services  Initial Wound and Skin Care Evaluation    Admission Date: 11/27/2020     Last order of IP CONSULT TO WOUND CARE was found on 11/30/2020 from Hospital Encounter on 11/27/2020       HPI, PMH, SH: Reviewed    Unit where seen by Wound Team: T332/02     WOUND CONSULT/FOLLOW UP RELATED TO:  Right posterior elbow     Self Report / Pain Level:  0/10       OBJECTIVE:  Pt sitting in chair with intact dressing in place. Preventable measures in place.    WOUND TYPE, LOCATION, CHARACTERISTICS (Pressure Injuries: location, stage, POA or date identified)    Wound 11/30/20 Traumatic Elbow Right skin tear, but wound looks deeper, draining blood (Active)   Wound Image   12/01/20 1100   Site Assessment Pink;Brown 12/01/20 1100   Periwound Assessment Clean;Dry;Intact 12/01/20 1100   Margins Attached edges;Defined edges 12/01/20 1100   Closure Secondary intention 12/01/20 1100   Drainage Amount Scant 12/01/20 1100   Drainage Description Serosanguineous 12/01/20 1100   Treatments Cleansed;Irrigation;Site care 12/01/20 1100   Wound Cleansing Approved Wound Cleanser 12/01/20 1100   Periwound Protectant Not Applicable 12/01/20 1100   Dressing Cleansing/Solutions Not Applicable 12/01/20 1100   Dressing Options Hydrofiber Silver;Silicone Adhesive Foam 12/01/20 1100   Dressing Changed New 12/01/20 1100   Dressing Status Clean;Dry;Intact 12/01/20 1100   Dressing Change/Treatment Frequency Every 48 hrs, and As Needed 12/01/20 1100   NEXT Dressing Change/Treatment Date 12/03/20 12/01/20 1100   NEXT Weekly Photo (Inpatient Only) 12/08/20 12/01/20 1100   Non-staged Wound Description Full thickness 12/01/20 1100   Wound Length (cm) 1.4 cm 12/01/20 1100   Wound Width (cm) 1.4 cm 12/01/20 1100   Wound Depth (cm) 0.3 cm 12/01/20 1100   Wound Surface Area (cm^2) 1.96 cm^2 12/01/20 1100   Wound Volume (cm^3) 0.59 cm^3 12/01/20 1100   Undermining (cm) 0.5 cm 12/01/20 1100   Undermining of Wound, 1st Location  From 2 o'clock;To 9 o'clock 12/01/20 1100   Shape Triangular 12/01/20 1100   Wound Odor None 12/01/20 1100   Pulses Right;Radial;2+ 12/01/20 1100   Exposed Structures None 12/01/20 1100   WOUND NURSE ONLY - Time Spent with Patient (mins) 45 12/01/20 1100     Vascular:    STACIE:   No results found.      Lab Values:    Lab Results   Component Value Date/Time    WBC 13.0 (H) 11/30/2020 09:21 AM    RBC 3.35 (L) 11/30/2020 09:21 AM    HEMOGLOBIN 11.2 (L) 11/30/2020 09:21 AM    HEMATOCRIT 34.8 (L) 11/30/2020 09:21 AM    HBA1C 5.1 06/15/2020 05:15 AM          Culture:   Not indicated, no erythema   Culture Results show:  No results found for this or any previous visit (from the past 720 hour(s)).      INTERVENTIONS BY WOUND TEAM:  Wound care RN to see patient at bedside for right elbow.  Patient sitting in chair and was able to elevate arm for better assessment.  Right posterior elbow has a scab at the proximal end of the wound bed and a deeper cut at the distal end with 0.5cm of undermining.  Aquacel Ag hydrofiber to the undermining and over top of the wound bed.  Covered with silicone adhesive foam.    Interdisciplinary consultation: Patient, Bedside RN (Melony)    EVALUATION: Patient had a traumatic fall while tangle up with his dogs.  Patient landed on his elbow and then his right hip.  Patient right femoral hip had a fracture, Dr. Thibodeaux took patient of rhemiarthroplasty on 11/28.  Patient right elbow is clean and has small undermining, Aquacel Ag Hydrofiber applied to manage bioburden, absorb exudate, and maintain a moist wound environment without laterally wicking exudate therefore reducing remy-wound maceration      Goals: Steady decrease in wound area and depth weekly.    NURSING PLAN OF CARE ORDERS (X):    Dressing changes: See Dressing Care orders: x  Skin care: See Skin Care orders:   Rectal tube care: See Rectal Tube Care orders:   Other orders:    RSKIN:   CURRENTLY IN PLACE (X), APPLIED THIS VISIT (A), ORDERED  (O):   Q shift Jerad:  x  Q shift pressure point assessments:  x  Pressure redistribution mattress    x        Low Airloss          Bariatric SHASHA         Bariatric foam           Heel float boots     Heel Silicone dressing        Float Heels off Bed with Pillows               Barrier wipes         Barrier Cream         Barrier paste          Sacral silicone dressing         Silicone O2 tubing         Anchorfast         Cannula fixation Device (Tender )          Gray Foam Ear protectors           Trach with Optifoam split foam                 Waffle cushion        Waffle Overlay   x      Rectal tube or BMS    Purwick/Condom Cath          Antifungal tx      Interdry          Reposition q 2 hours        Up to chair x       Ambulate  x    PT/OT   x     Dietician        Diabetes Education      PO  x   TF     TPN     NPO   # days   Other        WOUND TEAM PLAN OF CARE   Dressing changes by wound team:          Follow up 1-2 times weekly:               Follow up 3 times weekly:                NPWT change 3 times weekly:     Follow up as needed:    x   Other (explain):     Anticipated discharge plans:  LTACH:        SNF/Rehab:                  Home Care:   x        Outpatient Wound Center:            Self Care:    x

## 2020-12-01 NOTE — FACE TO FACE
Face to Face Note  -  Durable Medical Equipment    Chelo Coleman M.D. - NPI: [id number]  I certify that this patient is under my care and that they have had a durable medical equipment(DME)face to face encounter by myself that meets the physician DME face-to-face encounter requirements with this patient on:    Date of encounter:   Patient:                    MRN:                       YOB: 2021  Erasto Calhoun Jr.  [id number]  1940     The encounter with the patient was in whole, or in part, for the following medical condition, which is the primary reason for durable medical equipment:  Post-Op Surgery    I certify that, based on my findings, the following durable medical equipment is medically necessary:  Walkers.    HOME O2 Saturation Measurements:(Values must be present for Home Oxygen orders)         ,     ,         My Clinical findings support the need for the above equipment due to:  Frequent Falls    Supporting Symptoms: s/p fall and femoral neck fracture     ------------------------------------------------------------------------------------------------------------------    Face to Face Supporting Documentation - Home Health    The encounter with this patient was in whole or in part the primary reason for home health admission.    Date of encounter:   Patient:                    MRN:                       YOB: 2021  Erasto Calhoun Jr.  [id number]  1940     Home health to see patient for:  Skilled Nursing care for assessment, interventions & education, Physical Therapy evaluation and treatment and Occupational therapy evaluation and treatment    Skilled need for:  Surgical Aftercare S/P right hip hemiarthroplasty    Skilled nursing interventions to include:  Comment: assessment, evaluation    Homebound evidenced status by:  Need the aid of supportive devices such as crutches, canes, wheelchairs or walkers. Leaving home must require a  considerable and taxing effort. There must exist a normal inability to leave the home.    Community Physician to provide follow up care: Ria Rollins M.D.     Optional Interventions    Wound information & treatment:    Home Infusion Therapy orders:    Line/Drain/Airway:    I certify the face to face encounter for this home care referral meets the CMS requirements and the encounter/clinical assessment with the patient was, in whole, or in part, for the medical condition(s) listed above, which is the primary reason for home health care. Based on my clinical findings: the service(s) are medically necessary, support the need for home health care, and the homebound criteria are met.  I certify that this patient has had a face to face encounter by myself.  Chelo Coleman M.D. - NPI: [id number]    *Debility, frailty and advanced age in the absence of an acute deterioration or exacerbation of a condition do not qualify a patient for home health.

## 2020-12-01 NOTE — DISCHARGE SUMMARY
Discharge Summary    CHIEF COMPLAINT ON ADMISSION  Chief Complaint   Patient presents with   • T-5000 GLF     Pt fell from standing @ 2130, tripped over 2 dogs, fell on Rt hip. No head injury. Family helped up then called [facility]- pt unable to wt bear without 8/10 pain. 2/10 at present. PPP.       Reason for Admission   right hip pain post mechanical fall     Admission Date  11/27/2020    CODE STATUS  Full Code    HPI & HOSPITAL COURSE  An 80-year-old man with h/o CVA presented with right hip pain post mechanical fall. He tripped over his 2 dogs and fell.  XR showed impacted right femoral neck fracture. COVID 19 negative. CXR showed no acute cardiopulmonary findings. Patient underwent right hip hemiarthroplasty on 11/28/2020.    Per orthopedics, patient will continue aspirin 81 mg twice daily for 4 weeks for DVT prophylaxis    Patient will DC home with home health care.    Therefore, he is discharged in good and stable condition to home with organized home healthcare and close outpatient follow-up.    The patient met 2-midnight criteria for an inpatient stay at the time of discharge.    Discharge Date  12/1/2020    FOLLOW UP ITEMS POST DISCHARGE  Follow-up with primary care in 2 weeks  Follow-up with orthopedics in 2 weeks     DISCHARGE DIAGNOSES  Principal Problem (Resolved):    Femoral neck fracture (HCC) POA: Unknown  Active Problems:    History of CVA (cerebrovascular accident) POA: Yes    Dyslipidemia POA: Yes      FOLLOW UP  Future Appointments   Date Time Provider Department Center   5/4/2021  8:30 AM Ria Rollins M.D. [facility] RONY Royal M.D.  [address] [facility] [address]  [phone]    In 2 weeks  12/14      MEDICATIONS ON DISCHARGE     Medication List      START taking these medications      Instructions   acetaminophen 325 MG Tabs  Commonly known as: Tylenol   Take 2 Tabs by mouth every 6 hours as needed for up to 10 days.  Dose: 650 mg        CHANGE how you take these  medications      Instructions   aspirin 81 MG EC tablet  What changed: when to take this   Doctor's comments: dvt ppx per ortho  Take 1 Tab by mouth 2 Times a Day for 28 days.  Dose: 81 mg        CONTINUE taking these medications      Instructions   ascorbic acid 500 MG Tabs  Commonly known as: ascorbic acid   Take 500 mg by mouth every morning.  Dose: 500 mg     atorvastatin 80 MG tablet  Commonly known as: LIPITOR   Take 1 Tab by mouth every evening.  Dose: 80 mg     CALCIUM PO   Take 1 Cap by mouth every evening.  Dose: 1 Cap     carbamide peroxide 6.5 % Soln  Commonly known as: DEBROX   Instill 5 to 10 drops twice daily up to 4 days ( left ear).     cod liver oil Caps   Take 1 Cap by mouth every day.  Dose: 1 Cap     multivitamin Tabs   Take 1 Tab by mouth every morning.  Dose: 1 Tab            Allergies  No Known Allergies    DIET  Orders Placed This Encounter   Procedures   • Diet Order Diet: Regular     Standing Status:   Standing     Number of Occurrences:   1     Order Specific Question:   Diet:     Answer:   Regular [1]       ACTIVITY  As tolerated.  Weight bearing as tolerated    CONSULTATIONS  Orthopedics    PROCEDURES      LABORATORY  Lab Results   Component Value Date    SODIUM 134 (L) 11/30/2020    POTASSIUM 3.6 11/30/2020    CHLORIDE 104 11/30/2020    CO2 24 11/30/2020    GLUCOSE 135 (H) 11/30/2020    BUN 24 (H) 11/30/2020    CREATININE 0.82 11/30/2020        Lab Results   Component Value Date    WBC 13.0 (H) 11/30/2020    HEMOGLOBIN 11.2 (L) 11/30/2020    HEMATOCRIT 34.8 (L) 11/30/2020    PLATELETCT 172 11/30/2020        Total time of the discharge process exceeds 32 minutes.

## 2020-12-01 NOTE — THERAPY
Occupational Therapy  Daily Treatment     Patient Name: Erasto Calhoun Jr.  Age:  80 y.o., Sex:  male  Medical Record #: [id number]  Today's Date: 12/1/2020     Precautions  Precautions: (P) Posterior Hip Precautions, Weight Bearing As Tolerated Right Lower Extremity  Comments: MD ordered posterior hip precautions with pillow between legs in bed    Assessment    Steady improvement in functional level noted. Has family who will assist as needed at home. Issued with written handout of posterior hip precautions. Pain well controlled throughout activity. Wants to be home with home health rather than post acute.    Plan    Continue current treatment plan.    DC Equipment Recommendations: (P) None  Discharge Recommendations: (P) Recommend home health for continued occupational therapy services    Subjective    No new issues reported. Steady improvement noted.     Objective       12/01/20 1201   Total Time Spent   Total Time Spent (Mins) 42   Treatment Charges   Charges Yes   OT Self Care / ADL 2   OT Therapy Activity 1   Precautions   Precautions Posterior Hip Precautions;Weight Bearing As Tolerated Right Lower Extremity   Vitals   O2 Delivery Device None - Room Air   Pain 0 - 10 Group   Therapist Pain Assessment Post Activity Pain Same as Prior to Activity;Nurse Notified;0   Cognition    Cognition / Consciousness WDL   Level of Consciousness Alert   Passive ROM Upper Body   Passive ROM Upper Body WDL   Active ROM Upper Body   Active ROM Upper Body  WDL   Dominant Hand Right   Strength Upper Body   Upper Body Strength  WDL   Sensation Upper Body   Upper Extremity Sensation  WDL   Upper Body Muscle Tone   Upper Body Muscle Tone  WDL   Other Treatments   Other Treatments Provided reviewed home assist level. reports 4 fmaily members will be assisting. they have coordinated time off work, so he will have assist available 24/7.   Balance   Sitting Balance (Static) Fair +   Sitting Balance (Dynamic) Fair +   Standing  Balance (Static) Fair   Standing Balance (Dynamic) Fair   Weight Shift Sitting Good   Weight Shift Standing Fair   Bed Mobility    Supine to Sit Supervised   Sit to Supine   (left seated in bedside chair)   Scooting Supervised   Skilled Intervention Compensatory Strategies;Verbal Cuing   Activities of Daily Living   Eating Modified Independent   Grooming Supervision;Seated   Bathing Minimal Assist   Upper Body Dressing Supervision   Lower Body Dressing Moderate Assist   Toileting Supervision   How much help from another person does the patient currently need...   Putting on and taking off regular lower body clothing? 2   Bathing (including washing, rinsing, and drying)? 2   Toileting, which includes using a toilet, bedpan, or urinal? 3   Putting on and taking off regular upper body clothing? 3   Taking care of personal grooming such as brushing teeth? 4   Eating meals? 4   6 Clicks Daily Activity Score 18   Functional Mobility   Sit to Stand Supervised   Bed, Chair, Wheelchair Transfer Supervised   Toilet Transfers Supervised   Transfer Method Stand Pivot   Mobility using FWW   Visual Perception   Visual Perception  WDL   Activity Tolerance   Sitting in Chair ad nina   Sitting Edge of Bed ad nina   Standing ad nina   Patient / Family Goals   Patient / Family Goal #1 return home   Goal #1 Outcome Progressing as expected   Short Term Goals   Short Term Goal # 1 set up for LB dressing tasks, with good adherence to hip precautions/ use of AE   Goal Outcome # 1 Progressing as expected   Short Term Goal # 2 supervised level for toileting tasks   Goal Outcome # 2 Progressing as expected   Short Term Goal # 3 supervised level for necessary functional transfers   Goal Outcome # 3 Progressing as expected   Education Group   Role of Occupational Therapist Patient Response Patient;Acceptance;Explanation;Demonstration;Reinforcement Needed   Hip Precautions Patient Response Patient;Acceptance;Explanation;Demonstration;Reinforcement  Needed   Anticipated Discharge Equipment and Recommendations   DC Equipment Recommendations None   Discharge Recommendations Recommend home health for continued occupational therapy services   Interdisciplinary Plan of Care Collaboration   IDT Collaboration with  Nursing   Patient Position at End of Therapy Seated;Call Light within Reach;Tray Table within Reach;Phone within Reach   Collaboration Comments report given, RN in to medicate/ change dressing at end of session   Session Information   Date / Session Number  12/1,2( 2/2,12/5)   Priority 2

## 2020-12-01 NOTE — DISCHARGE PLANNING
Received Choice form at [phone]  Agency/Facility Name: [facility]  Referral sent per Choice form @ [phone]

## 2020-12-01 NOTE — DISCHARGE PLANNING
Anticipated Discharge Disposition: TBD- Home w/ HH pending clearance and orders    Action: LSW met w/ pt at bedside, he is insisting that he wants to go home w/ HH. Pt did not want to consider SNF, he stated that he is feeling much stronger and getting up and around on his own today.  Pt says that his dtr and her family live just down the block and they are available if he needs help.  Pt says that he is open to HH, LSW discussed that he has SCP and a HH referral would need to be sent to [facility], pt agreed.    Barriers to Discharge: medical clearance, HH acceptance    Plan: F/u w/ medical team for HH clearance/orders, will send referral when appropriate.

## 2020-12-01 NOTE — PROGRESS NOTES
Waiting to go home this evening  Incision dry  + DF/PF  AVSS  OK to d/c from ortho standpoint  Dressing can be removed 12/3  ASA BID x 4 weeks  F/u JUAN F 12/14 or 12/16

## 2020-12-01 NOTE — PROGRESS NOTES
DATE OF SERVICE: 11/30/2020    TIME:  Approximately 6:23 a.m.    SUBJECTIVE:  The patient is doing fine today.  He does still have some pain,   but it is improved.    PHYSICAL EXAMINATION:  VITAL SIGNS:  Blood pressure 137/66, heart rate 83, respirations 16,   temperature 98.5.  Dressing is dry.  EXTREMITIES:  He is able to dorsiflex and plantarflex his toes.    ASSESSMENT:  Right femoral neck fracture -- status post hemiarthroplasty.    PLAN:  Mobilize with therapy with weightbearing as tolerated and posterior hip   precautions.  Aspirin 81 mg p.o. b.i.d. for 4 weeks for DVT prophylaxis in   addition to SCDs.  He needs discharge planning as he will likely require SNF.  He can   follow up at the [facility] on 12/14 for wound check and followup   radiographs.  His dry dressing can be left in place until postoperative day   #5, then it can be removed with the Steri-Strips to be left in place and   following that can get wet in the shower.       ____________________________________     MD BOB FLORES / KOFI    DD:  11/30/2020 09:28:54  DT:  11/30/2020 20:41:00    D#:  [id number]  Job#:  [id number]

## 2020-12-01 NOTE — DISCHARGE PLANNING
ATTN: Case Management  RE: Referral for Home Health    As of  12/02/20, we have accepted the Home Health referral for the patient listed above.    A [facility] clinician will be out to see the patient within 48 hours. If you have any questions or concerns regarding the patient's transition to Home Health, please do not hesitate to contact us at [phone].      We look forward to collaborating with you,  [facility] Team

## 2020-12-02 NOTE — DISCHARGE INSTRUCTIONS
"OK to remove dressing on hip Thursday 12/3.  Change elbow dressing daily per video instructions on phone.   Follow up with primary care provider in 1-2 weeks to check elbow wound and for hospital follow up.   Take aspirin 81mg twice daily for 4 weeks to prevent blood clots after surgery.       Hip Hemiarthroplasty  The hip joint is located where the upper end of the femur meets the pelvis socket (acetabulum). The femur, or thigh bone, looks like a long stem with a ball on the end. The acetabulum is a socket or cup-like structure in the pelvis, or hip bone. This \"ball and socket\" allows your hip to move.  During Hip hemiarthroplasty, your surgeon removes the diseased bone tissue and cartilage from the hip joint. The healthy parts of the hip are left intact. The head of the femur (the ball) and the socket (acetabulum) is replaced with new, artificial parts. The new hip is made of materials that allow a normal movement of the joint. This surgery usually lasts 2 to 3 hours.   The purpose of this surgery is to reduce pain and improve range of motion. It is one of the most successful joint replacement surgeries. It most often greatly improves the quality of life.  LET YOUR CAREGIVERS KNOW ABOUT:  · Allergies  · Medications taken including herbs, eye drops, over the counter medications, and creams  · Use of steroids (by mouth or creams)  · Previous problems with anesthetics or Novocaine  · Possibility of pregnancy, if this applies  · History of blood clots (thrombophlebitis)  · History of bleeding or blood problems  · Previous surgery  · Other health problems  BEFORE THE PROCEDURE  · Do not eat or drink anything for as long as directed by your caregiver prior to surgery.  · You should be present 60 minutes prior to your procedure or as directed.  Prior to surgery an IV (intravenous line for giving fluids) may be started. You may be given an anesthetic (medications and gas to help you sleep) during the procedure.   AFTER " THE PROCEDURE  After surgery, you will be taken to the recovery area. There a nurse will watch and check your progress. You may have a catheter (a long, narrow, hollow tube) in your bladder following surgery. The catheter helps you pass your water. Once you're awake, stable, and taking fluids well, barring other problems you'll be returned to your room. You will receive physical therapy until you are doing well and your caregiver feels it is safe for you to be transferred home or to an extended care facility.  HOME CARE INSTRUCTIONS   · You may resume normal diet and activities as directed or allowed.  · Change dressings if necessary or as directed.  · Only take over-the-counter or prescription medicines for pain, discomfort, or fever as directed by your caregiver.  SEEK IMMEDIATE MEDICAL CARE IF:  You develop:  · Swelling of your calf or leg or develop shortness of breath or chest pain.  · Redness, swelling, or increasing pain in the wound.  · Pus coming from wound.  · An unexplained oral temperature over 102° F (38.9° C) develops.  · A foul smell coming from the wound or dressing.  · A breaking open of the wound (edges not staying together) after sutures or staples have been removed.  MAKE SURE YOU:   · Understand these instructions.  · Will watch your condition.  · Will get help right away if you are not doing well or get worse.  Document Released: 01/02/2008 Document Revised: 03/11/2013 Document Reviewed: 01/29/2008  [employer]® Patient Information ©2014 [employer].      Hip Fracture    A hip fracture is a break in the upper part of the thigh bone (femur). This is usually the result of an injury, commonly a fall.  What are the causes?  This condition may be caused by:  · A direct hit or injury (trauma) to the side of the hip, such as from a fall or a car accident.  What increases the risk?  You are more likely to develop this condition if:  · You have poor balance or an unsteady walking pattern (gait). Certain  conditions contribute to poor balance, including Parkinson disease and dementia.  · You have thinning or weakening of your bones, such as from osteopenia or osteoporosis.  · You have cancer that spreads to the leg bones.  · You have certain conditions that can weaken your bones, such as thyroid disorders, intestine disorders, or a lack (deficiency) of certain nutrients.  · You smoke.  · You take certain medicines, such as steroids.  · You have a history of broken bones.  What are the signs or symptoms?  Symptoms of this condition include:  · Pain over the injured hip. This is commonly felt on the side of the hip or in the front groin area.  · Stiffness, bruising, and swelling over the hip.  · Pain with movement of the leg, especially lifting it up. Pain often gets better with rest.  · Difficulty or inability to stand, walk, or use the leg to support body weight (put weight on the leg).  · The leg rolling outward when lying down.  · The affected leg being shorter than the other leg.  How is this diagnosed?  This condition may be diagnosed based on:  · Your symptoms.  · A physical exam.  · X-rays. These may be done:  ? To confirm the diagnosis.  ? To determine the type and location of the fracture.  ? To check for other injuries.  · MRI or CT scans. These may be done if the fracture is not visible on an X-ray.  How is this treated?  Treatment for this condition depends on the severity and location of your fracture. In most cases, surgery is necessary. Surgery may involve:  · Repairing the fracture with a screw, nail, or neptali to hold the bone in place (open reduction and internal fixation, ORIF).  · Replacing the damaged parts of the femur with metal implants (hemiarthroplasty or arthroplasty).  If your fracture is less severe, or if you are not eligible for surgery, you may have non-surgical treatment. Non-surgical treatment may involve:  · Using crutches, a walker, or a wheelchair until your health care provider says  that you can support (bear) weight on your hip.  · Medicines to help reduce pain and swelling.  · Having regular X-rays to monitor your fracture and make sure that it is healing.  · Physical therapy. You may need physical therapy after surgery, too.  Follow these instructions at home:  Activity  · Do not use your injured leg to support your body weight until your health care provider says that you can.  ? Follow standing and walking restrictions as told by your health care provider.  ? Use crutches, a walker, or a wheelchair as directed.  · Avoid any activities that cause pain or irritation in your hip. Ask your health care provider what activities are safe for you.  · Do not drive or use heavy machinery until your health care provider approves.  · If physical therapy was prescribed, do exercises as told by your health care provider.  General instructions  · Take over-the-counter and prescription medicines only as told by your health care provider.  · If directed, put ice on the injured area:  ? Put ice in a plastic bag.  ? Place a towel between your skin and the bag.  ? Leave the ice on for 20 minutes, 2-3 times a day.  · Do not use any products that contain nicotine or tobacco, such as cigarettes and e-cigarettes. These can delay bone healing. If you need help quitting, ask your health care provider.  · Keep all follow-up visits as told by your health care provider. This is important.  How is this prevented?  · To prevent falls at home:  ? Use a cane, walker, or wheelchair as directed.  ? Make sure your rooms and hallways are free of clutter, obstacles, and cords.  ? Install grab bars in your bedroom and bathrooms.  ? Always use handrails when going up and down stairs.  ? Use nightlights around the house.  · Exercise regularly. Ask what forms of exercise are safe for you, such as walking and strength and balance exercises.  · Visit an eye doctor regularly to have your eyesight checked. This can help prevent  falls.  · Make sure you get enough calcium and vitamin D.  · Do not use any products that contain nicotine or tobacco, such as cigarettes and e-cigarettes. If you need help quitting, ask your health care provider.  · Limit alcohol use.  · If you have an underlying condition that caused your hip fracture, work with your health care provider to manage your condition.  Contact a health care provider if:  · Your pain gets worse or it does not get better with rest or medicine.  · You develop any of the following in your leg or foot:  ? Numbness.  ? Tingling.  ? A change in skin color (discoloration).  ? Skin feeling cold to the touch.  Get help right away if:  · Your pain suddenly gets worse.  · You cannot move your hip.  Summary  · A hip fracture is a break in the upper part of the thigh bone (femur).  · Treatment typically require surgical management to restore stability and function to the hip.  · Pain medicine and icing of the affected leg can help manage pain and swelling. Follow directions as told by your health care provider.  This information is not intended to replace advice given to you by your health care provider. Make sure you discuss any questions you have with your health care provider.  Document Released: 12/18/2006 Document Revised: 09/07/2019 Document Reviewed: 01/20/2018  [employer] Patient Education © 2020 [employer]    Discharge Instructions    Discharged to home by car with relative. Discharged via wheelchair, hospital escort: Yes.  Special equipment needed: Not Applicable    Be sure to schedule a follow-up appointment with your primary care doctor or any specialists as instructed.     Discharge Plan:   Influenza Vaccine Indication: Not indicated: Previously immunized this influenza season and > 8 years of age    I understand that a diet low in cholesterol, fat, and sodium is recommended for good health. Unless I have been given specific instructions below for another diet, I accept this  instruction as my diet prescription.   Other diet: Resume previous diet    Special Instructions: None    · Is patient discharged on Warfarin / Coumadin?   No     Depression / Suicide Risk    As you are discharged from this [facility] facility, it is important to learn how to keep safe from harming yourself.    Recognize the warning signs:  · Abrupt changes in personality, positive or negative- including increase in energy   · Giving away possessions  · Change in eating patterns- significant weight changes-  positive or negative  · Change in sleeping patterns- unable to sleep or sleeping all the time   · Unwillingness or inability to communicate  · Depression  · Unusual sadness, discouragement and loneliness  · Talk of wanting to die  · Neglect of personal appearance   · Rebelliousness- reckless behavior  · Withdrawal from people/activities they love  · Confusion- inability to concentrate     If you or a loved one observes any of these behaviors or has concerns about self-harm, here's what you can do:  · Talk about it- your feelings and reasons for harming yourself  · Remove any means that you might use to hurt yourself (examples: pills, rope, extension cords, firearm)  · Get professional help from the community (Mental Health, Substance Abuse, psychological counseling)  · Do not be alone:Call your Safe Contact- someone whom you trust who will be there for you.  · Call your local CRISIS HOTLINE 985-6635 or 408-102-8570  · Call your local Children's Mobile Crisis Response Team [place] (566) 491-5241 or www.Bazari  · Call the toll free National Suicide Prevention Hotlines   · National Suicide Prevention Lifeline 482-222-NVSR (9113)  · National Hope Line Network 800-SUICIDE (048-1582)

## 2020-12-02 NOTE — THERAPY
Physical Therapy   Daily Treatment     Patient Name: Erasto Calhoun Jr.  Age:  80 y.o., Sex:  male  Medical Record #: [id number]  Today's Date: 12/1/2020     Precautions: Posterior Hip Precautions, Weight Bearing As Tolerated Right Lower Extremity    Assessment    Pt was seen for PT treatment, instructed on post hip precautions, LE HEP, gait with FWW and stair training. Pt is mobilizing WFL for home with family assistance.  See below for details and goals.   Plan    Continue current treatment plan.    DC Equipment Recommendations: Front-Wheel Walker  Discharge Recommendations: Recommend home health for continued physical therapy services      Objective       12/01/20 1600   Balance   Sitting Balance (Static) Good   Sitting Balance (Dynamic) Good   Standing Balance (Static) Good   Standing Balance (Dynamic) Fair +   Weight Shift Sitting Fair   Weight Shift Standing Fair   Skilled Intervention Verbal Cuing;Tactile Cuing;Sequencing   Gait Analysis   Gait Level Of Assist Supervised   Assistive Device Front Wheel Walker   Distance (Feet) 200   # of Times Distance was Traveled 1   Deviation Antalgic;Step To   # of Stairs Climbed 4   Level of Assist with Stairs Minimal Assist   Skilled Intervention Verbal Cuing;Tactile Cuing;Sequencing   Bed Mobility    Supine to Sit Supervised   Sit to Supine Supervised   Scooting Supervised   Functional Mobility   Sit to Stand Supervised   Bed, Chair, Wheelchair Transfer Supervised   Transfer Method Stand Step   Skilled Intervention Verbal Cuing   Short Term Goals    Short Term Goal # 1 Pt will be able to complete bed mobility with SPV in 6tx in order to return home   Goal Outcome # 1 Goal met   Short Term Goal # 2 Pt will be able to complete functional transfers with FWW and SPV in 6tx in order to return home   Goal Outcome # 2 Goal met   Short Term Goal # 3 Pt will be able to ambulate 150ft with FWW and SPV in 6tx in order to return home   Goal Outcome # 3 Goal met   Short Term  Goal # 4 Pt will be able to negotiate 2 steps with SPV in 6tx in order to enter and exit his home   Goal Outcome # 4 Goal met

## 2020-12-02 NOTE — PROGRESS NOTES
Patient transferred to the discharge lounge with all personal belongings. IV removed. Discharge instructions provided, verbalized understanding. Pt escorted out to daughters car.

## 2020-12-02 NOTE — PROGRESS NOTES
Pt off floor to flor/chucho garcia via wheel chair. All belongings with patient. FLOR/CHUCHO garcia communicated with this RN regarding needs and education.

## 2020-12-03 ENCOUNTER — HOME CARE VISIT (OUTPATIENT)
Dept: HOME HEALTH SERVICES | Facility: HOME HEALTHCARE | Age: 80
End: 2020-12-03
Payer: MEDICARE

## 2020-12-03 VITALS
TEMPERATURE: 97.5 F | HEIGHT: 71 IN | RESPIRATION RATE: 17 BRPM | OXYGEN SATURATION: 93 % | DIASTOLIC BLOOD PRESSURE: 72 MMHG | SYSTOLIC BLOOD PRESSURE: 124 MMHG | HEART RATE: 79 BPM | BODY MASS INDEX: 23.59 KG/M2 | WEIGHT: 168.5 LBS

## 2020-12-03 PROCEDURE — 6650537 HCR  CLEANSER ANTISEPTIC HAND FOAM 1.6OZ

## 2020-12-03 PROCEDURE — 665001 SOC-HOME HEALTH

## 2020-12-03 PROCEDURE — G0493 RN CARE EA 15 MIN HH/HOSPICE: HCPCS

## 2020-12-03 ASSESSMENT — PATIENT HEALTH QUESTIONNAIRE - PHQ9
CLINICAL INTERPRETATION OF PHQ2 SCORE: 0
2. FEELING DOWN, DEPRESSED, IRRITABLE, OR HOPELESS: 00
1. LITTLE INTEREST OR PLEASURE IN DOING THINGS: 00

## 2020-12-03 ASSESSMENT — ACTIVITIES OF DAILY LIVING (ADL): OASIS_M1830: 03

## 2020-12-03 ASSESSMENT — ENCOUNTER SYMPTOMS
MUSCLE WEAKNESS: 1
LIMITED RANGE OF MOTION: 1

## 2020-12-03 ASSESSMENT — FIBROSIS 4 INDEX: FIB4 SCORE: 2.86

## 2020-12-04 ENCOUNTER — ANTICOAGULATION MONITORING (OUTPATIENT)
Dept: VASCULAR LAB | Facility: MEDICAL CENTER | Age: 80
End: 2020-12-04

## 2020-12-04 NOTE — CONSULTS
ORTHOPEDIC SURGERY CONSULT NOTE    CC: right hip pain    HPI:  Erasto Calhoun Jr. is a 80 y.o. male who tripped in his garage. His daughter was visiting with her dog and the dogs got underfoot and he fell. He had immediate pain in his right hip and inability to ambulate. Pain is alleviated by rest and exacerbated by movement. Pain is sharp in nature. Denies other areas of pain.     Review of systems was completed and is negative except per HPI    Past Medical History:   Diagnosis Date   • Cataract 01/22/2020    bilateral   • Dental disorder 01/22/2020    upper and lower dentures   • High cholesterol 01/22/2020    on med   • Hyperlipidemia 1/9/2017    Pravastin x 10 yrs    • Lupus (systemic lupus erythematosus) (HCC)     Pt reports only symptom was rash, on plaquenil for several yrs, stopped 2004       Past Surgical History:   Procedure Laterality Date   • PB PARTIAL HIP REPLACEMENT Right 11/28/2020    Procedure: HEMIARTHROPLASTY, HIP;  Surgeon: Kendrick Royal M.D.;  Location: SURGERY [facility];  Service: Orthopedics   • CATARACT PHACO WITH IOL Left 2/11/2020    Procedure: PHACOEMULSIFICATION, CATARACT, WITH IOL INSERTION;  Surgeon: Kiran Jang M.D.;  Location: SURGERY SAME DAY [facility];  Service: Ophthalmology   • CATARACT PHACO WITH IOL Right 1/28/2020    Procedure: PHACOEMULSIFICATION, CATARACT, WITH IOL INSERTION;  Surgeon: Kiran Jang M.D.;  Location: SURGERY SAME DAY [facility];  Service: Ophthalmology   • OTHER SURGICAL PROCEDURE Right 1972    right heel injury   • CAST APPLICATION      climbing, collar bone injury (7th grade)   • OPEN REDUCTION         Social History     Socioeconomic History   • Marital status:      Spouse name: Not on file   • Number of children: Not on file   • Years of education: Not on file   • Highest education level: Not on file   Occupational History   • Not on file   Social Needs   • Financial resource strain: Not on file   • Food insecurity      Worry: Not on file     Inability: Not on file   • Transportation needs     Medical: No     Non-medical: No   Tobacco Use   • Smoking status: Former Smoker     Packs/day: 1.00     Years: 4.00     Pack years: 4.00     Types: Cigarettes     Quit date: 1962     Years since quittin.9   • Smokeless tobacco: Never Used   • Tobacco comment: quit    Substance and Sexual Activity   • Alcohol use: Yes     Alcohol/week: 0.6 oz     Types: 1 Shots of liquor per week     Comment: 1 per week   • Drug use: No   • Sexual activity: Not Currently     Partners: Female   Lifestyle   • Physical activity     Days per week: Not on file     Minutes per session: Not on file   • Stress: Not on file   Relationships   • Social connections     Talks on phone: Not on file     Gets together: Not on file     Attends Holiness service: Not on file     Active member of club or organization: Not on file     Attends meetings of clubs or organizations: Not on file     Relationship status: Not on file   • Intimate partner violence     Fear of current or ex partner: Not on file     Emotionally abused: Not on file     Physically abused: Not on file     Forced sexual activity: Not on file   Other Topics Concern   • Not on file   Social History Narrative   • Not on file       Family History   Problem Relation Age of Onset   • Cancer Mother         skin cancer   • Stroke Mother 92   • Diabetes Mother    • Hypertension Father    • Heart Disease Father 55        father passed from heart attack   • No Known Problems Brother    • No Known Problems Brother    • Cancer Brother         throat ca   • Alcohol/Drug Brother         heavy drinker          PHYSICAL EXAM  General: No acute distress. Non toxic appearing  Neuro: follows commands  Resp: unlabored breathing on room air  Cv: extremities are warm and well perfused  Abd: non distended  MSK: RLE examined. Wiggles toes. SILT on dorsal and plantar foot     RESULTS REVIEWED  xrays demonstrate displaced  femoral neck fracture    ASSESSMENT AND PLAN  80 y.o. male with right femoral neck fracture. Discussed operative management. He is quite active and independent. He would like to proceed with surgery. Will plan on OR with Dr Eagle.       Jelly Ortiz M.D.

## 2020-12-04 NOTE — PROGRESS NOTES
Received referral from [facility]. Medications reviewed. No clinically significant interactions noted.     John Marroquin, PharmD, MS, BCACP, [facility]  Phone [phone] fax [phone]    This note was created using voice recognition software (Dragon). The accuracy of the dictation is limited by the abilities of the software. I have reviewed the note prior to signing, however some errors in grammar and context are still possible. If you have any questions related to this note please do not hesitate to contact our office.

## 2020-12-05 ENCOUNTER — HOME CARE VISIT (OUTPATIENT)
Dept: HOME HEALTH SERVICES | Facility: HOME HEALTHCARE | Age: 80
End: 2020-12-05
Payer: MEDICARE

## 2020-12-05 VITALS
HEART RATE: 83 BPM | OXYGEN SATURATION: 93 % | TEMPERATURE: 98.6 F | DIASTOLIC BLOOD PRESSURE: 72 MMHG | SYSTOLIC BLOOD PRESSURE: 140 MMHG | RESPIRATION RATE: 18 BRPM

## 2020-12-05 PROCEDURE — G0299 HHS/HOSPICE OF RN EA 15 MIN: HCPCS

## 2020-12-05 ASSESSMENT — ENCOUNTER SYMPTOMS
VOMITING: DENIES
MUSCLE WEAKNESS: 1
NAUSEA: DENIES
LIMITED RANGE OF MOTION: 1

## 2020-12-05 ASSESSMENT — ACTIVITIES OF DAILY LIVING (ADL)
CURRENT_FUNCTION: STAND BY ASSIST
AMBULATION ASSISTANCE: STAND BY ASSIST

## 2020-12-09 ENCOUNTER — HOME CARE VISIT (OUTPATIENT)
Dept: HOME HEALTH SERVICES | Facility: HOME HEALTHCARE | Age: 80
End: 2020-12-09
Payer: MEDICARE

## 2020-12-09 VITALS
SYSTOLIC BLOOD PRESSURE: 114 MMHG | DIASTOLIC BLOOD PRESSURE: 66 MMHG | RESPIRATION RATE: 18 BRPM | OXYGEN SATURATION: 92 % | HEART RATE: 85 BPM | TEMPERATURE: 98 F

## 2020-12-09 PROCEDURE — G0151 HHCP-SERV OF PT,EA 15 MIN: HCPCS

## 2020-12-09 PROCEDURE — G0299 HHS/HOSPICE OF RN EA 15 MIN: HCPCS

## 2020-12-09 ASSESSMENT — ENCOUNTER SYMPTOMS
MUSCLE WEAKNESS: 1
LIMITED RANGE OF MOTION: 1
NAUSEA: DENIES
VOMITING: DENIES

## 2020-12-09 ASSESSMENT — ACTIVITIES OF DAILY LIVING (ADL)
AMBULATION ASSISTANCE: STAND BY ASSIST
CURRENT_FUNCTION: STAND BY ASSIST

## 2020-12-10 ENCOUNTER — HOME CARE VISIT (OUTPATIENT)
Dept: HOME HEALTH SERVICES | Facility: HOME HEALTHCARE | Age: 80
End: 2020-12-10
Payer: MEDICARE

## 2020-12-10 VITALS
HEART RATE: 82 BPM | SYSTOLIC BLOOD PRESSURE: 114 MMHG | OXYGEN SATURATION: 96 % | TEMPERATURE: 97.8 F | RESPIRATION RATE: 18 BRPM | DIASTOLIC BLOOD PRESSURE: 60 MMHG

## 2020-12-10 VITALS
OXYGEN SATURATION: 92 % | HEART RATE: 85 BPM | SYSTOLIC BLOOD PRESSURE: 114 MMHG | DIASTOLIC BLOOD PRESSURE: 66 MMHG | RESPIRATION RATE: 18 BRPM | TEMPERATURE: 98 F

## 2020-12-10 PROCEDURE — G0299 HHS/HOSPICE OF RN EA 15 MIN: HCPCS

## 2020-12-10 PROCEDURE — G0152 HHCP-SERV OF OT,EA 15 MIN: HCPCS

## 2020-12-10 SDOH — ECONOMIC STABILITY: HOUSING INSECURITY

## 2020-12-10 SDOH — ECONOMIC STABILITY: HOUSING INSECURITY: HOME SAFETY: T WITH CLEAR PATHWAYS

## 2020-12-10 ASSESSMENT — ACTIVITIES OF DAILY LIVING (ADL)
TRANSPORTATION COMMENTS: YES
GROOMING_COMMENTS: REFER TO OT EVALUATION
FEEDING_WITHIN_DEFINED_LIMITS: 1
ADLS_COMMENTS: REFER TO OT EVALUATION
AMBULATION ASSISTANCE ON FLAT SURFACES: 1

## 2020-12-10 ASSESSMENT — ENCOUNTER SYMPTOMS
ARTHRALGIAS: 1
MUSCLE WEAKNESS: 1
LIMITED RANGE OF MOTION: 1
NAUSEA: DENIED
VOMITING: DENIED

## 2020-12-11 ENCOUNTER — HOME CARE VISIT (OUTPATIENT)
Dept: HOME HEALTH SERVICES | Facility: HOME HEALTHCARE | Age: 80
End: 2020-12-11
Payer: MEDICARE

## 2020-12-11 VITALS
OXYGEN SATURATION: 95 % | RESPIRATION RATE: 18 BRPM | SYSTOLIC BLOOD PRESSURE: 118 MMHG | HEART RATE: 83 BPM | DIASTOLIC BLOOD PRESSURE: 66 MMHG | TEMPERATURE: 98.3 F

## 2020-12-11 PROCEDURE — G0151 HHCP-SERV OF PT,EA 15 MIN: HCPCS

## 2020-12-11 NOTE — PROGRESS NOTES
"PT. A&O X4. DENIED ANY PAIN. LUNGS CLEAR T/O. VSS. DRSG TO RT. ELBOW WOUND INTACT, WITH NO DRAINAGE SHOWING THROUGH.PT. DID NOT WANT DRSG CHANGED. STATED,\"IT WAS CHANGED YESTERDAY.\" INC TO RT. HIP WELL APPROXIMATED, WITH STERI STRIPS INTACT. THIS SN WENT TO REMOVE STERI STRIPS AND FOUND A LONG SUTURE UNDER PROXIMAL AND DISTAL END OF INCISION, UNDER STERI STRIPS.  DR. LEWIS'S OFFICE CALLED, SPOKE TO TIAGO KING,AND SHE STATED THEY WERE DISSOLVABLE SUTURES AND TO CONTINUE WITH ORDER. STERI STRIPS REMOVED. INCISION SITE PINK, NO DRAINAGE NOTED, BUT DOES HAVE SOME SWELLING AND HARDNESS ALONG INCISION LINE. SITE CLEANSED WITH ALCOHOL, NEW STERI STRIPS APPLIED. TEACHNG DONE ON S/SX INFECTION-NEW REDNESS, DRAINAGE, INCREASED PAIN, FEVER>100.4. INSTRUCTED PT. TO NOTIFY HHRN/MD IF ANY S/SX DEVELOP.PT. WITH 2+ EDEMA RLE. TEACHING DONE ON NEED TO ELEVATE BLE AS MUCH AS POSSIBLE, NO TIGHT SHOES/SOX, NOTIFY HHRN/MD IF ANY WEEPING NOTED, DECREASE SODIUM IN DIET-AVOID HAM, BERG, SAUSAGE, PIZZA, CHINESE FOOD, READ LABELS TO KEEP SODIUM <30% PER SERVING, USE  FRESH/FROZEN VEGETABLES WHEN ABLE, USE LEAN CUISINE/HEALTHY CHOICE FOR TV DINNERS.TEACHING DONE ON FALL SAFETY-USE WALKER FOR ALL AMBULATION, CARRY CELL PHONE WITH HIM AT ALL TIMES, USE OF NIGHT LIGHTS IN HALLWAYS, BATHROOM, BEDROOM, WEAR GRIPPER SLIPPERS, IF ANY FALLS, CALL 911. MED RECONCILIATION DONE WITH PT. DENIED ANY NEW/CHANGED MEDS."

## 2020-12-12 ENCOUNTER — HOME CARE VISIT (OUTPATIENT)
Dept: HOME HEALTH SERVICES | Facility: HOME HEALTHCARE | Age: 80
End: 2020-12-12
Payer: MEDICARE

## 2020-12-12 VITALS
DIASTOLIC BLOOD PRESSURE: 70 MMHG | SYSTOLIC BLOOD PRESSURE: 120 MMHG | TEMPERATURE: 99.3 F | OXYGEN SATURATION: 96 % | RESPIRATION RATE: 18 BRPM | HEART RATE: 86 BPM

## 2020-12-12 PROCEDURE — G0299 HHS/HOSPICE OF RN EA 15 MIN: HCPCS

## 2020-12-12 ASSESSMENT — ENCOUNTER SYMPTOMS
LIMITED RANGE OF MOTION: 1
NAUSEA: DENIES
VOMITING: DENIES

## 2020-12-12 NOTE — PROGRESS NOTES
Pt met a friend outside this morning prior to SNV. Told RN that his dog gave birth to 3 puppies last night, and the friend will take care of his dogs for about a month. Educated pt on social distancing, use of facial masks and good hygiene. Pt voiced understanding. Pt denies any pain, new falls or changes to meds. Right hip incision CDI, steri strips in place, no drainage. Pt is seeing surgeon next Monday. Wound care to right elbow performed per POC, small amount of serosanguinous drainage, no s/sx of infection. Dtr Chiara is visiting but remained in another room during this entire visit.

## 2020-12-14 ENCOUNTER — HOME CARE VISIT (OUTPATIENT)
Dept: HOME HEALTH SERVICES | Facility: HOME HEALTHCARE | Age: 80
End: 2020-12-14
Payer: MEDICARE

## 2020-12-14 VITALS
OXYGEN SATURATION: 95 % | DIASTOLIC BLOOD PRESSURE: 68 MMHG | HEART RATE: 86 BPM | RESPIRATION RATE: 18 BRPM | SYSTOLIC BLOOD PRESSURE: 118 MMHG | TEMPERATURE: 97.9 F

## 2020-12-14 PROCEDURE — A6214 FOAM DRG > 48 SQ IN W/BORDER: HCPCS

## 2020-12-14 PROCEDURE — G0151 HHCP-SERV OF PT,EA 15 MIN: HCPCS

## 2020-12-14 PROCEDURE — G0299 HHS/HOSPICE OF RN EA 15 MIN: HCPCS

## 2020-12-14 PROCEDURE — A6197 ALGINATE DRSG >16 <=48 SQ IN: HCPCS

## 2020-12-14 ASSESSMENT — ENCOUNTER SYMPTOMS
NAUSEA: NO
VOMITING: NO

## 2020-12-15 ENCOUNTER — HOME CARE VISIT (OUTPATIENT)
Dept: HOME HEALTH SERVICES | Facility: HOME HEALTHCARE | Age: 80
End: 2020-12-15
Payer: MEDICARE

## 2020-12-15 VITALS
OXYGEN SATURATION: 90 % | TEMPERATURE: 97.7 F | SYSTOLIC BLOOD PRESSURE: 132 MMHG | DIASTOLIC BLOOD PRESSURE: 81 MMHG | HEART RATE: 80 BPM | RESPIRATION RATE: 18 BRPM

## 2020-12-15 VITALS
TEMPERATURE: 97.6 F | HEART RATE: 87 BPM | OXYGEN SATURATION: 91 % | SYSTOLIC BLOOD PRESSURE: 137 MMHG | DIASTOLIC BLOOD PRESSURE: 75 MMHG | RESPIRATION RATE: 18 BRPM

## 2020-12-15 VITALS
TEMPERATURE: 97.9 F | RESPIRATION RATE: 18 BRPM | OXYGEN SATURATION: 95 % | SYSTOLIC BLOOD PRESSURE: 118 MMHG | DIASTOLIC BLOOD PRESSURE: 68 MMHG | HEART RATE: 86 BPM

## 2020-12-15 DIAGNOSIS — S51.001D ELBOW WOUND, RIGHT, SUBSEQUENT ENCOUNTER: ICD-10-CM

## 2020-12-15 PROCEDURE — G0152 HHCP-SERV OF OT,EA 15 MIN: HCPCS

## 2020-12-15 ASSESSMENT — ACTIVITIES OF DAILY LIVING (ADL)
ORAL_CARE_CURRENT_FUNCTION: INDEPENDENT
LAUNDRY ASSESSED: 1
SHOPPING: NEEDS ASSISTANCE
LIGHT HOUSEKEEPING: NEEDS ASSISTANCE
ORAL_CARE_ASSESSED: 1
DRESSING_UB_CURRENT_FUNCTION: INDEPENDENT
FEEDING: INDEPENDENT
FEEDING ASSESSED: 1
GROOMING ASSESSED: 1
TOILETING: INDEPENDENT
TRANSPORTATION ASSESSED: 1
GROOMING_CURRENT_FUNCTION: INDEPENDENT
TRANSPORTATION: DEPENDENT
PREPARING MEALS: NEEDS ASSISTANCE
TELEPHONE USE ASSESSED: 1
AMBULATION ASSISTANCE: 1
SHOPPING ASSESSED: 1
AMBULATION ASSISTANCE: INDEPENDENT
USING THE TELPHONE: INDEPENDENT
HOUSEKEEPING ASSESSED: 1
BATHING_CURRENT_FUNCTION: SUPERVISION
TOILETING: 1
DRESSING_LB_CURRENT_FUNCTION: STAND BY ASSIST
LAUNDRY: NEEDS ASSISTANCE
BATHING ASSESSED: 1

## 2020-12-15 ASSESSMENT — ENCOUNTER SYMPTOMS: POOR JUDGMENT: 1

## 2020-12-16 ENCOUNTER — HOME CARE VISIT (OUTPATIENT)
Dept: HOME HEALTH SERVICES | Facility: HOME HEALTHCARE | Age: 80
End: 2020-12-16
Payer: MEDICARE

## 2020-12-16 ENCOUNTER — TELEPHONE (OUTPATIENT)
Dept: MEDICAL GROUP | Facility: PHYSICIAN GROUP | Age: 80
End: 2020-12-16

## 2020-12-16 VITALS
RESPIRATION RATE: 18 BRPM | HEART RATE: 92 BPM | TEMPERATURE: 98.2 F | OXYGEN SATURATION: 93 % | DIASTOLIC BLOOD PRESSURE: 64 MMHG | SYSTOLIC BLOOD PRESSURE: 108 MMHG

## 2020-12-16 PROCEDURE — A4927 NON-STERILE GLOVES: HCPCS

## 2020-12-16 PROCEDURE — A6214 FOAM DRG > 48 SQ IN W/BORDER: HCPCS

## 2020-12-16 PROCEDURE — G0299 HHS/HOSPICE OF RN EA 15 MIN: HCPCS

## 2020-12-16 ASSESSMENT — ENCOUNTER SYMPTOMS
MENTAL STATUS CHANGE: 0
NAUSEA: NO
MUSCLE WEAKNESS: 1
VOMITING: NO

## 2020-12-17 ENCOUNTER — HOME CARE VISIT (OUTPATIENT)
Dept: HOME HEALTH SERVICES | Facility: HOME HEALTHCARE | Age: 80
End: 2020-12-17
Payer: MEDICARE

## 2020-12-17 PROCEDURE — G0151 HHCP-SERV OF PT,EA 15 MIN: HCPCS

## 2020-12-17 PROCEDURE — G0152 HHCP-SERV OF OT,EA 15 MIN: HCPCS

## 2020-12-18 ENCOUNTER — HOME CARE VISIT (OUTPATIENT)
Dept: HOME HEALTH SERVICES | Facility: HOME HEALTHCARE | Age: 80
End: 2020-12-18
Payer: MEDICARE

## 2020-12-18 VITALS
HEART RATE: 75 BPM | DIASTOLIC BLOOD PRESSURE: 76 MMHG | TEMPERATURE: 98.2 F | OXYGEN SATURATION: 94 % | SYSTOLIC BLOOD PRESSURE: 126 MMHG | RESPIRATION RATE: 18 BRPM

## 2020-12-18 VITALS
DIASTOLIC BLOOD PRESSURE: 78 MMHG | SYSTOLIC BLOOD PRESSURE: 124 MMHG | TEMPERATURE: 98.3 F | HEART RATE: 85 BPM | OXYGEN SATURATION: 95 % | RESPIRATION RATE: 18 BRPM

## 2020-12-18 PROCEDURE — G0299 HHS/HOSPICE OF RN EA 15 MIN: HCPCS

## 2020-12-18 ASSESSMENT — ENCOUNTER SYMPTOMS
NAUSEA: DENIES
LIMITED RANGE OF MOTION: 1
MUSCLE WEAKNESS: 1
VOMITING: DENIES

## 2020-12-20 VITALS
DIASTOLIC BLOOD PRESSURE: 78 MMHG | RESPIRATION RATE: 18 BRPM | SYSTOLIC BLOOD PRESSURE: 124 MMHG | OXYGEN SATURATION: 95 % | HEART RATE: 85 BPM | TEMPERATURE: 98.3 F

## 2020-12-20 ASSESSMENT — ENCOUNTER SYMPTOMS
DIFFICULTY THINKING: 1
POOR JUDGMENT: 1

## 2020-12-21 ENCOUNTER — HOME CARE VISIT (OUTPATIENT)
Dept: HOME HEALTH SERVICES | Facility: HOME HEALTHCARE | Age: 80
End: 2020-12-21
Payer: MEDICARE

## 2020-12-21 VITALS
TEMPERATURE: 98 F | RESPIRATION RATE: 16 BRPM | HEART RATE: 86 BPM | OXYGEN SATURATION: 95 % | SYSTOLIC BLOOD PRESSURE: 124 MMHG | DIASTOLIC BLOOD PRESSURE: 68 MMHG

## 2020-12-21 VITALS
SYSTOLIC BLOOD PRESSURE: 114 MMHG | RESPIRATION RATE: 18 BRPM | OXYGEN SATURATION: 94 % | DIASTOLIC BLOOD PRESSURE: 68 MMHG | HEART RATE: 84 BPM | TEMPERATURE: 98.6 F

## 2020-12-21 PROCEDURE — G0151 HHCP-SERV OF PT,EA 15 MIN: HCPCS

## 2020-12-21 PROCEDURE — G0299 HHS/HOSPICE OF RN EA 15 MIN: HCPCS

## 2020-12-21 PROCEDURE — G0180 MD CERTIFICATION HHA PATIENT: HCPCS | Performed by: INTERNAL MEDICINE

## 2020-12-21 ASSESSMENT — ENCOUNTER SYMPTOMS
LIMITED RANGE OF MOTION: 1
VOMITING: DENIES
NAUSEA: DENIES

## 2020-12-22 ENCOUNTER — HOME CARE VISIT (OUTPATIENT)
Dept: HOME HEALTH SERVICES | Facility: HOME HEALTHCARE | Age: 80
End: 2020-12-22
Payer: MEDICARE

## 2020-12-22 PROCEDURE — G0152 HHCP-SERV OF OT,EA 15 MIN: HCPCS

## 2020-12-23 ENCOUNTER — HOME CARE VISIT (OUTPATIENT)
Dept: HOME HEALTH SERVICES | Facility: HOME HEALTHCARE | Age: 80
End: 2020-12-23
Payer: MEDICARE

## 2020-12-23 VITALS
SYSTOLIC BLOOD PRESSURE: 123 MMHG | DIASTOLIC BLOOD PRESSURE: 72 MMHG | HEART RATE: 95 BPM | OXYGEN SATURATION: 93 % | RESPIRATION RATE: 18 BRPM | TEMPERATURE: 97.9 F

## 2020-12-23 VITALS
OXYGEN SATURATION: 93 % | RESPIRATION RATE: 18 BRPM | SYSTOLIC BLOOD PRESSURE: 108 MMHG | TEMPERATURE: 97.9 F | HEART RATE: 90 BPM | DIASTOLIC BLOOD PRESSURE: 62 MMHG

## 2020-12-23 PROCEDURE — G0151 HHCP-SERV OF PT,EA 15 MIN: HCPCS

## 2020-12-23 ASSESSMENT — ENCOUNTER SYMPTOMS
DIFFICULTY THINKING: 1
POOR JUDGMENT: 1

## 2020-12-24 ENCOUNTER — HOME CARE VISIT (OUTPATIENT)
Dept: HOME HEALTH SERVICES | Facility: HOME HEALTHCARE | Age: 80
End: 2020-12-24
Payer: MEDICARE

## 2020-12-24 PROCEDURE — G0299 HHS/HOSPICE OF RN EA 15 MIN: HCPCS

## 2020-12-28 ENCOUNTER — HOME CARE VISIT (OUTPATIENT)
Dept: HOME HEALTH SERVICES | Facility: HOME HEALTHCARE | Age: 80
End: 2020-12-28
Payer: MEDICARE

## 2020-12-28 VITALS
TEMPERATURE: 98.2 F | SYSTOLIC BLOOD PRESSURE: 113 MMHG | DIASTOLIC BLOOD PRESSURE: 64 MMHG | OXYGEN SATURATION: 93 % | RESPIRATION RATE: 16 BRPM | HEART RATE: 73 BPM

## 2020-12-28 VITALS
TEMPERATURE: 97.5 F | HEART RATE: 84 BPM | SYSTOLIC BLOOD PRESSURE: 110 MMHG | RESPIRATION RATE: 18 BRPM | OXYGEN SATURATION: 94 % | DIASTOLIC BLOOD PRESSURE: 78 MMHG

## 2020-12-28 VITALS
SYSTOLIC BLOOD PRESSURE: 110 MMHG | DIASTOLIC BLOOD PRESSURE: 64 MMHG | OXYGEN SATURATION: 93 % | RESPIRATION RATE: 18 BRPM | HEART RATE: 82 BPM | TEMPERATURE: 98.6 F

## 2020-12-28 PROCEDURE — G0299 HHS/HOSPICE OF RN EA 15 MIN: HCPCS

## 2020-12-28 PROCEDURE — G0151 HHCP-SERV OF PT,EA 15 MIN: HCPCS

## 2020-12-28 ASSESSMENT — ENCOUNTER SYMPTOMS
VOMITING: DENIES
NAUSEA: DENIES
MUSCLE WEAKNESS: 1
NAUSEA: DENIES
VOMITING: DENIES

## 2020-12-30 ENCOUNTER — HOME CARE VISIT (OUTPATIENT)
Dept: HOME HEALTH SERVICES | Facility: HOME HEALTHCARE | Age: 80
End: 2020-12-30
Payer: MEDICARE

## 2020-12-30 VITALS
SYSTOLIC BLOOD PRESSURE: 110 MMHG | OXYGEN SATURATION: 93 % | TEMPERATURE: 98.4 F | RESPIRATION RATE: 18 BRPM | HEART RATE: 88 BPM | DIASTOLIC BLOOD PRESSURE: 60 MMHG

## 2020-12-30 PROCEDURE — G0151 HHCP-SERV OF PT,EA 15 MIN: HCPCS

## 2020-12-31 ENCOUNTER — HOME CARE VISIT (OUTPATIENT)
Dept: HOME HEALTH SERVICES | Facility: HOME HEALTHCARE | Age: 80
End: 2020-12-31
Payer: MEDICARE

## 2020-12-31 VITALS
TEMPERATURE: 97.9 F | HEART RATE: 78 BPM | DIASTOLIC BLOOD PRESSURE: 62 MMHG | SYSTOLIC BLOOD PRESSURE: 112 MMHG | RESPIRATION RATE: 18 BRPM | OXYGEN SATURATION: 95 %

## 2020-12-31 PROCEDURE — G0299 HHS/HOSPICE OF RN EA 15 MIN: HCPCS

## 2020-12-31 ASSESSMENT — ENCOUNTER SYMPTOMS
NAUSEA: DENIED
VOMITING: DENIED

## 2020-12-31 ASSESSMENT — ACTIVITIES OF DAILY LIVING (ADL)
PHYSICAL TRANSFERS ASSESSED: 1
AMBULATION ASSISTANCE ON FLAT SURFACES: 1
CURRENT_FUNCTION: INDEPENDENT
PHYSICAL_TRANSFERS_DEVICES: FWW
AMBULATION ASSISTANCE: 1
TRANSPORTATION COMMENTS: YES
AMBULATION ASSISTANCE: INDEPENDENT

## 2021-01-01 NOTE — PROGRESS NOTES
"PT. A&O X4. DENIED ANY PAIN. LUNGS CLEAR T/O. VSS. DRSG TO RT. ELBOW WOUND REMOVED. NO DRAINAGE NOTED ON OLD DRSG. WOUND CLOSED. NO S/SX INFECTION. LEFT OPEN TO AIR. INC TO RT. HIP WELL APPROXIMATED, WITH 3 STERI STRIPS INTACT AT DISTAL END OF INCISION.NO S/SX INFECTION. NO SUTURES NOTED AT PROXIMAL OR DISTAL END OF INCISION.NO S/SX INFECTION, REDNESS, HARDNESS OR DRAINAGE NOTED. PT. WITH DRY, BLACK ESCHAR/SCAB TO POSTERIOR RT. HEEL. DENIED ANY PAIN. NO REDNESS OR DRAINAGE NOTED. ORDERS WERE FOR DRSG TO BE APPLIED. SPOKE TO AMGO CORTEZ, RN/SUPERVISOR AND KATIA HAMMONDS RN/CM, THAT THIS SN DID NOT FEEL COMFORTABLE PUTTING MOIST DRSG ON DRY ESCHAR. PT. STATED, \"I REALLY DON'T WANT ANYTHING PUT ON MY HEEL. IT WAS PROBABLY CAUSED BY BEING IN THE HOSPITAL \" INSTRUCTED PT. TO OFFLOAD AS MUCH AS POSSIBLE, NO TIGHT SHOES/SOX, IF ANY PAIN OR DRAINAGE NOTED, TO CALL HHRN IMMEDIATELY. NO EDEMA NOTED TODAY.REINSTRUCTED ON NEED TO CONTINUE TO DECREASE SODIUM IN DIET-AVOID PIZZA, CHINESE FOOD, READ LABELS TO KEEP SODIUM <30% PER SERVING, USE  FRESH/FROZEN VEGETABLES WHEN ABLE, USE LEAN CUISINE/HEALTHY CHOICE FOR TV DINNERS.PT. DENIED ANY NEW FALLS-REINSTRUCTED ON FALL SAFETY-USE WALKER FOR ALL AMBULATION, CARRY CELL PHONE WITH HIM AT ALL TIMES, USE OF NIGHT LIGHTS IN HALLWAYS, BATHROOM, BEDROOM, WEAR GRIPPER SLIPPERS, IF ANY FALLS, CALL 911. MED RECONCILIATION DONE WITH PT. DENIED ANY NEW/CHANGED MEDS."

## 2021-01-04 ENCOUNTER — HOME CARE VISIT (OUTPATIENT)
Dept: HOME HEALTH SERVICES | Facility: HOME HEALTHCARE | Age: 81
End: 2021-01-04
Payer: MEDICARE

## 2021-01-04 VITALS
TEMPERATURE: 98 F | DIASTOLIC BLOOD PRESSURE: 76 MMHG | OXYGEN SATURATION: 96 % | RESPIRATION RATE: 18 BRPM | SYSTOLIC BLOOD PRESSURE: 110 MMHG | HEART RATE: 92 BPM

## 2021-01-04 VITALS
DIASTOLIC BLOOD PRESSURE: 74 MMHG | HEART RATE: 90 BPM | RESPIRATION RATE: 18 BRPM | OXYGEN SATURATION: 92 % | TEMPERATURE: 98.5 F | SYSTOLIC BLOOD PRESSURE: 112 MMHG

## 2021-01-04 PROCEDURE — G0299 HHS/HOSPICE OF RN EA 15 MIN: HCPCS

## 2021-01-04 PROCEDURE — G0151 HHCP-SERV OF PT,EA 15 MIN: HCPCS

## 2021-01-04 PROCEDURE — 665001 SOC-HOME HEALTH

## 2021-01-04 PROCEDURE — A6250 SKIN SEAL PROTECT MOISTURIZR: HCPCS

## 2021-01-04 ASSESSMENT — ENCOUNTER SYMPTOMS
NAUSEA: NO
VOMITING: NO
LIMITED RANGE OF MOTION: 1

## 2021-01-06 ENCOUNTER — HOME CARE VISIT (OUTPATIENT)
Dept: HOME HEALTH SERVICES | Facility: HOME HEALTHCARE | Age: 81
End: 2021-01-06
Payer: MEDICARE

## 2021-01-06 VITALS
TEMPERATURE: 98.9 F | OXYGEN SATURATION: 90 % | RESPIRATION RATE: 18 BRPM | DIASTOLIC BLOOD PRESSURE: 58 MMHG | SYSTOLIC BLOOD PRESSURE: 102 MMHG | HEART RATE: 84 BPM

## 2021-01-06 PROCEDURE — G0151 HHCP-SERV OF PT,EA 15 MIN: HCPCS

## 2021-01-07 ENCOUNTER — HOME CARE VISIT (OUTPATIENT)
Dept: HOME HEALTH SERVICES | Facility: HOME HEALTHCARE | Age: 81
End: 2021-01-07
Payer: MEDICARE

## 2021-01-07 VITALS
SYSTOLIC BLOOD PRESSURE: 122 MMHG | RESPIRATION RATE: 16 BRPM | TEMPERATURE: 97.5 F | OXYGEN SATURATION: 98 % | DIASTOLIC BLOOD PRESSURE: 78 MMHG | HEART RATE: 75 BPM

## 2021-01-07 PROCEDURE — G0299 HHS/HOSPICE OF RN EA 15 MIN: HCPCS

## 2021-01-08 ASSESSMENT — ENCOUNTER SYMPTOMS
NAUSEA: DENIES
LIMITED RANGE OF MOTION: 1
AGITATION: 1
MUSCLE WEAKNESS: 1
VOMITING: DENIES

## 2021-01-11 DIAGNOSIS — Z23 NEED FOR VACCINATION: ICD-10-CM

## 2021-01-12 ENCOUNTER — HOME CARE VISIT (OUTPATIENT)
Dept: HOME HEALTH SERVICES | Facility: HOME HEALTHCARE | Age: 81
End: 2021-01-12
Payer: MEDICARE

## 2021-01-12 VITALS
DIASTOLIC BLOOD PRESSURE: 80 MMHG | RESPIRATION RATE: 18 BRPM | TEMPERATURE: 97.7 F | OXYGEN SATURATION: 93 % | HEART RATE: 82 BPM | SYSTOLIC BLOOD PRESSURE: 100 MMHG

## 2021-01-12 PROCEDURE — G0299 HHS/HOSPICE OF RN EA 15 MIN: HCPCS

## 2021-01-12 ASSESSMENT — ENCOUNTER SYMPTOMS
NAUSEA: DENIES
LIMITED RANGE OF MOTION: 1
MUSCLE WEAKNESS: 1
VOMITING: DENIES

## 2021-01-14 ENCOUNTER — HOME CARE VISIT (OUTPATIENT)
Dept: HOME HEALTH SERVICES | Facility: HOME HEALTHCARE | Age: 81
End: 2021-01-14
Payer: MEDICARE

## 2021-01-14 VITALS
DIASTOLIC BLOOD PRESSURE: 60 MMHG | RESPIRATION RATE: 18 BRPM | HEART RATE: 80 BPM | TEMPERATURE: 97 F | OXYGEN SATURATION: 95 % | SYSTOLIC BLOOD PRESSURE: 104 MMHG

## 2021-01-14 PROCEDURE — G0151 HHCP-SERV OF PT,EA 15 MIN: HCPCS

## 2021-01-19 ENCOUNTER — HOME CARE VISIT (OUTPATIENT)
Dept: HOME HEALTH SERVICES | Facility: HOME HEALTHCARE | Age: 81
End: 2021-01-19
Payer: MEDICARE

## 2021-01-19 VITALS
SYSTOLIC BLOOD PRESSURE: 110 MMHG | TEMPERATURE: 97.8 F | DIASTOLIC BLOOD PRESSURE: 60 MMHG | RESPIRATION RATE: 18 BRPM | HEART RATE: 72 BPM | OXYGEN SATURATION: 95 %

## 2021-01-19 VITALS
SYSTOLIC BLOOD PRESSURE: 112 MMHG | DIASTOLIC BLOOD PRESSURE: 64 MMHG | OXYGEN SATURATION: 96 % | RESPIRATION RATE: 18 BRPM | HEART RATE: 84 BPM | TEMPERATURE: 98.5 F

## 2021-01-19 PROCEDURE — G0495 RN CARE TRAIN/EDU IN HH: HCPCS

## 2021-01-19 PROCEDURE — G0151 HHCP-SERV OF PT,EA 15 MIN: HCPCS

## 2021-01-19 ASSESSMENT — ENCOUNTER SYMPTOMS
NAUSEA: DENIED
VOMITING: DENIED

## 2021-01-19 ASSESSMENT — ACTIVITIES OF DAILY LIVING (ADL): TRANSPORTATION COMMENTS: YES

## 2021-01-20 NOTE — PROGRESS NOTES
PT. A&O X4. DENIED ANY PAIN. LUNGS CLEAR T/O. TEACHING DONE ON USE, TECHNIQUE, SCHEDULE OF I.S. PT. ABLE TP PULL 800CC ON RETURN DEMO, WITHOUT ADVERSE EFFECTS. INSTRUCTED PT. TO DO EVERY HOUR, WITH 5 REPETITIONS.VSS. INC TO RT. HIP WELL APPROXIMATED, CLOSED. NO S/SX INFECTION. PT. CONTINUES WITH SMALL AREA OF DRY, BLACK ESCHAR/SCAB TO POSTERIOR RT. HEEL. DENIED ANY PAIN. NO S/S INFECTION , REDNESS OR DRAINAGE NOTED. LEFT BLAIR. REINSTRUCTED PT. TO OFFLOAD AS MUCH AS POSSIBLE, NO TIGHT SHOES/SOX, IF ANY REDNESS, PAIN OR DRAINAGE NOTED, TO CALL RN IMMEDIATELY. NO EDEMA NOTED TODAY.REINSTRUCTED ON NEED TO CONTINUE TO DECREASE SODIUM IN DIET-AVOID PIZZA, CHINESE FOOD, READ LABELS TO KEEP SODIUM <30% PER SERVING, USE  FRESH/FROZEN VEGETABLES WHEN ABLE, USE LEAN CUISINE/HEALTHY CHOICE FOR TV DINNERS. TEACHING DONE ON NEED TO CHANGE POSITIONS EVERY HOUR , WALK AT 50 FT EVERY, DO ANKLE PUMPS WHILE SITTING IN CHAIR, TO PREVENT DVT.PT. DENIED ANY NEW FALLS-REINSTRUCTED ON FALL SAFETY-USE WALKER FOR ALL AMBULATION, CARRY CELL PHONE WITH HIM AT ALL TIMES, USE OF NIGHT LIGHTS IN HALLWAYS, BATHROOM, BEDROOM, WEAR GRIPPER SLIPPERS, IF ANY FALLS, CALL 911. TEACHING DONE ON NEED TO WASH HANDS BEFORE EATING, AFTER PETTING HIS DOG AND USING BR. INSTRUCTED TO USE SOAP/WATER AFTER USING BR.MED RECONCILIATION DONE WITH PT. DENIED ANY NEW/CHANGED MEDS.

## 2021-01-25 ENCOUNTER — HOME CARE VISIT (OUTPATIENT)
Dept: HOME HEALTH SERVICES | Facility: HOME HEALTHCARE | Age: 81
End: 2021-01-25
Payer: MEDICARE

## 2021-01-25 VITALS
HEART RATE: 88 BPM | TEMPERATURE: 98.8 F | DIASTOLIC BLOOD PRESSURE: 62 MMHG | RESPIRATION RATE: 18 BRPM | SYSTOLIC BLOOD PRESSURE: 104 MMHG | OXYGEN SATURATION: 94 %

## 2021-01-25 PROCEDURE — G0151 HHCP-SERV OF PT,EA 15 MIN: HCPCS

## 2021-01-27 ENCOUNTER — HOME CARE VISIT (OUTPATIENT)
Dept: HOME HEALTH SERVICES | Facility: HOME HEALTHCARE | Age: 81
End: 2021-01-27
Payer: MEDICARE

## 2021-01-27 ASSESSMENT — ACTIVITIES OF DAILY LIVING (ADL)
HOME_HEALTH_OASIS: 01
OASIS_M1830: 01

## 2021-04-23 NOTE — ASSESSMENT & PLAN NOTE
Dr. Dominguez called. He wants writer to order potassium lab. Per PCP pt agrees to providing lab.   Lab ordered.   X-ray confirms right femoral neck fracture  Patient underwent right hip hemiarthroplasty on 11/28/2020  Orthopedics following, recommendations appreciated  D5  mL/h  Dilaudid 1 mg q4h prn   Tylenol prn  WBAT RLE  DVT prophylaxis: SCD+aspirin  PT, OT recommended post-acute placement

## 2021-04-27 ENCOUNTER — TELEPHONE (OUTPATIENT)
Dept: MEDICAL GROUP | Facility: PHYSICIAN GROUP | Age: 81
End: 2021-04-27

## 2021-04-27 NOTE — TELEPHONE ENCOUNTER
ESTABLISHED PATIENT PRE-VISIT PLANNING     Patient was NOT contacted to complete PVP.     Note: Patient will not be contacted if there is no indication to call.     1.  Reviewed notes from the last few office visits within the medical group: Yes    2.  If any orders were placed at last visit or intended to be done for this visit (i.e. 6 mos follow-up), do we have Results/Consult Notes?         •  Labs - Labs were not ordered at last office visit.  Note: If patient appointment is for lab review and patient did not complete labs, check with provider if OK to reschedule patient until labs completed.       •  Imaging - Imaging was not ordered at last office visit.       •  Referrals - No referrals were ordered at last office visit.    3. Is this appointment scheduled as a Hospital Follow-Up? No    4.  Immunizations were updated in Epic using Reconcile Outside Information activity? Yes    5.  Patient is due for the following Health Maintenance Topics:   Health Maintenance Due   Topic Date Due   • Annual Wellness Visit  12/20/2020       6.  AHA (Pulse8) form printed for Provider? Yes

## 2021-05-04 ENCOUNTER — OFFICE VISIT (OUTPATIENT)
Dept: MEDICAL GROUP | Facility: PHYSICIAN GROUP | Age: 81
End: 2021-05-04
Payer: MEDICARE

## 2021-05-04 VITALS
OXYGEN SATURATION: 96 % | WEIGHT: 162 LBS | HEART RATE: 76 BPM | SYSTOLIC BLOOD PRESSURE: 144 MMHG | TEMPERATURE: 98.3 F | HEIGHT: 72 IN | BODY MASS INDEX: 21.94 KG/M2 | DIASTOLIC BLOOD PRESSURE: 74 MMHG

## 2021-05-04 DIAGNOSIS — Z00.00 PREVENTATIVE HEALTH CARE: ICD-10-CM

## 2021-05-04 DIAGNOSIS — M25.561 CHRONIC PAIN OF RIGHT KNEE: ICD-10-CM

## 2021-05-04 DIAGNOSIS — M25.461 KNEE EFFUSION, RIGHT: ICD-10-CM

## 2021-05-04 DIAGNOSIS — L89.629 PRESSURE INJURY OF SKIN OF LEFT HEEL, UNSPECIFIED INJURY STAGE: ICD-10-CM

## 2021-05-04 DIAGNOSIS — G89.29 CHRONIC PAIN OF RIGHT KNEE: ICD-10-CM

## 2021-05-04 PROCEDURE — 99214 OFFICE O/P EST MOD 30 MIN: CPT | Performed by: FAMILY MEDICINE

## 2021-05-04 ASSESSMENT — FIBROSIS 4 INDEX: FIB4 SCORE: 2.86

## 2021-05-04 ASSESSMENT — PATIENT HEALTH QUESTIONNAIRE - PHQ9: CLINICAL INTERPRETATION OF PHQ2 SCORE: 0

## 2021-05-04 NOTE — PROGRESS NOTES
CC: Knee pain, pressure ulcer    HISTORY OF THE PRESENT ILLNESS: Patient is a 80 y.o. male.     Patient is here today with primary concern for knee pain and pressure ulcer.    He unfortunately sustained a mechanical fall in November 2020 and broke his femur.  He underwent hip replacement and feels he has been recovering fairly well.  His big concern is that since the time of his injury he has had swelling of his knee, minimal pain and extremely limited range of motion, unable to flex the knee past about 55 to 60 degrees.  He did undergo physical therapy and they did focus on his knee and his knee range of motion.  He continues these exercises but the knee is not getting any better.  He notes that he already had an x-ray through his orthopedic doctor, and they told him that x-ray was unremarkable.    He has also had a pressure ulcer on his right heel since the time of his surgery.  He notes that its minimally painful unless he bumps it on something.  No surrounding redness or drainage.    Allergies: Patient has no known allergies.    Current Outpatient Medications Ordered in Epic   Medication Sig Dispense Refill   • aspirin 81 MG tablet Take 81 mg by mouth every day.     • bismuth subsalicylate (PEPTIC RELIEF) 262 MG/15ML Suspension Take 30 mL by mouth every 6 hours as needed (indigestion or diarrhea).     • Artificial Tear Solution (SOOTHE XP XTRA PROTECTION) Solution Administer 1 Drop into both eyes as needed (dry eyes ).     • cod liver oil Cap Take 1 Cap by mouth every day.     • atorvastatin (LIPITOR) 80 MG tablet Take 1 Tab by mouth every evening. 100 Tab 2   • CALCIUM PO Take 1 Cap by mouth every evening. 600 mg     • ascorbic acid (ASCORBIC ACID) 500 MG Tab Take 1,000 mg by mouth every morning.     • multivitamin (THERAGRAN) Tab Take 1 Tab by mouth every morning.       No current [facility]-ordered facility-administered medications on file.       Past Medical History:   Diagnosis Date   • Cataract 01/22/2020     bilateral   • Dental disorder 2020    upper and lower dentures   • High cholesterol 2020    on med   • Hyperlipidemia 2017    Pravastin x 10 yrs    • Lupus (systemic lupus erythematosus) (HCC)     Pt reports only symptom was rash, on plaquenil for several yrs, stopped 2004       Past Surgical History:   Procedure Laterality Date   • PB PARTIAL HIP REPLACEMENT Right 2020    Procedure: HEMIARTHROPLASTY, HIP;  Surgeon: Kendrick Royal M.D.;  Location: SURGERY [facility];  Service: Orthopedics   • CATARACT PHACO WITH IOL Left 2020    Procedure: PHACOEMULSIFICATION, CATARACT, WITH IOL INSERTION;  Surgeon: Kiran Jang M.D.;  Location: SURGERY SAME DAY [facility];  Service: Ophthalmology   • CATARACT PHACO WITH IOL Right 2020    Procedure: PHACOEMULSIFICATION, CATARACT, WITH IOL INSERTION;  Surgeon: Kiran Jang M.D.;  Location: SURGERY SAME DAY [facility];  Service: Ophthalmology   • OTHER SURGICAL PROCEDURE Right     right heel injury   • CAST APPLICATION      climbing, collar bone injury (7th grade)   • OPEN REDUCTION         Social History     Tobacco Use   • Smoking status: Former Smoker     Packs/day: 1.00     Years: 4.00     Pack years: 4.00     Types: Cigarettes     Quit date: 1962     Years since quittin.3   • Smokeless tobacco: Never Used   • Tobacco comment: quit    Substance Use Topics   • Alcohol use: Yes     Alcohol/week: 0.6 oz     Types: 1 Shots of liquor per week     Comment: 1 per week   • Drug use: No       Social History     Social History Narrative   • Not on file       Family History   Problem Relation Age of Onset   • Cancer Mother         skin cancer   • Stroke Mother 92   • Diabetes Mother    • Hypertension Father    • Heart Disease Father 55        father passed from heart attack   • No Known Problems Brother    • No Known Problems Brother    • Cancer Brother         throat ca   • Alcohol/Drug Brother         heavy drinker        ROS:   Denies fever, chest pain, shortness of breath      Labs: Labs reviewed from November 30, 2020.    Exam: /74 (BP Location: Right arm, Patient Position: Sitting, BP Cuff Size: Adult)   Pulse 76   Temp 36.8 °C (98.3 °F) (Temporal)   Ht 1.829 m (6')   Wt 73.5 kg (162 lb)   SpO2 96%  Body mass index is 21.97 kg/m².    General: Well appearing, NAD  Skin: Decubitus ulcer noted on right calcaneus.  No surrounding erythema or warmth.  No drainage or otherwise signs of infection.  Musculoskeletal: No tenderness to palpation of the knee noted along the joint line or otherwise.  No varus or valgus laxity.  He is unable to flex the knee past about 55 degrees even with my assistance.  He has a grade 2 knee effusion.  Psych: Normal mood and affect. Alert and oriented. Judgment and insight is normal.    Please note that this dictation was created using voice recognition software. I have made every reasonable attempt to correct obvious errors, but I expect that there are errors of grammar and possibly content that I did not discover before finalizing the note.      Assessment/Plan  Erasto was seen today for knee pain and wound check.    Diagnoses and all orders for this visit:    Chronic pain of right knee  Knee effusion, right  Ongoing issue since surgery about 6 months ago.  Again describes it less his pain and more is just extremely limited range of motion making his normal activities difficult.  Apparently x-ray was normal with orthopedics.  He has also not responded to physical therapy to help with his range of motion, so he is not keen on trying this again at this time.  Recommend MRI at this time to further characterize any potential soft tissue injury which he is agreeable to.  -     MR-KNEE-W/O RIGHT; Future    Pressure injury of skin of left heel, unspecified injury stage  Chronic and ongoing issue.  No signs of infection at this time which is excellent.  This is something that would certainly  respond very well to wound care and debridement, so referral has been placed.  -     REFERRAL TO WOUND CLINIC    Preventative health care  -     CBC WITH DIFFERENTIAL; Future  -     Comp Metabolic Panel; Future  -     Lipid Profile; Future  -     TSH WITH REFLEX TO FT4; Future    Follow-up in about 6 months or sooner if needed.    Ana Ohara DO  [facility]

## 2021-05-06 ENCOUNTER — HOSPITAL ENCOUNTER (OUTPATIENT)
Dept: LAB | Facility: MEDICAL CENTER | Age: 81
End: 2021-05-06
Attending: FAMILY MEDICINE
Payer: MEDICARE

## 2021-05-06 DIAGNOSIS — Z00.00 PREVENTATIVE HEALTH CARE: ICD-10-CM

## 2021-05-06 LAB
ALBUMIN SERPL BCP-MCNC: 3.6 G/DL (ref 3.2–4.9)
ALBUMIN/GLOB SERPL: 1.3 G/DL
ALP SERPL-CCNC: 86 U/L (ref 30–99)
ALT SERPL-CCNC: 20 U/L (ref 2–50)
ANION GAP SERPL CALC-SCNC: 7 MMOL/L (ref 7–16)
ANISOCYTOSIS BLD QL SMEAR: ABNORMAL
AST SERPL-CCNC: 24 U/L (ref 12–45)
BASOPHILS # BLD AUTO: 0 % (ref 0–1.8)
BASOPHILS # BLD: 0 K/UL (ref 0–0.12)
BILIRUB SERPL-MCNC: 1.5 MG/DL (ref 0.1–1.5)
BUN SERPL-MCNC: 29 MG/DL (ref 8–22)
BURR CELLS BLD QL SMEAR: NORMAL
CALCIUM SERPL-MCNC: 9.1 MG/DL (ref 8.5–10.5)
CHLORIDE SERPL-SCNC: 108 MMOL/L (ref 96–112)
CHOLEST SERPL-MCNC: 110 MG/DL (ref 100–199)
CO2 SERPL-SCNC: 25 MMOL/L (ref 20–33)
CREAT SERPL-MCNC: 0.83 MG/DL (ref 0.5–1.4)
EOSINOPHIL # BLD AUTO: 0.44 K/UL (ref 0–0.51)
EOSINOPHIL NFR BLD: 5.1 % (ref 0–6.9)
ERYTHROCYTE [DISTWIDTH] IN BLOOD BY AUTOMATED COUNT: 59.8 FL (ref 35.9–50)
FASTING STATUS PATIENT QL REPORTED: NORMAL
GLOBULIN SER CALC-MCNC: 2.7 G/DL (ref 1.9–3.5)
GLUCOSE SERPL-MCNC: 101 MG/DL (ref 65–99)
HCT VFR BLD AUTO: 40.9 % (ref 42–52)
HDLC SERPL-MCNC: 34 MG/DL
HGB BLD-MCNC: 13 G/DL (ref 14–18)
LDLC SERPL CALC-MCNC: 58 MG/DL
LYMPHOCYTES # BLD AUTO: 5.43 K/UL (ref 1–4.8)
LYMPHOCYTES NFR BLD: 62.4 % (ref 22–41)
MACROCYTES BLD QL SMEAR: ABNORMAL
MANUAL DIFF BLD: NORMAL
MCH RBC QN AUTO: 31.9 PG (ref 27–33)
MCHC RBC AUTO-ENTMCNC: 31.8 G/DL (ref 33.7–35.3)
MCV RBC AUTO: 100.2 FL (ref 81.4–97.8)
MONOCYTES # BLD AUTO: 0.37 K/UL (ref 0–0.85)
MONOCYTES NFR BLD AUTO: 4.3 % (ref 0–13.4)
MORPHOLOGY BLD-IMP: NORMAL
NEUTROPHILS # BLD AUTO: 2.45 K/UL (ref 1.82–7.42)
NEUTROPHILS NFR BLD: 28.2 % (ref 44–72)
NRBC # BLD AUTO: 0 K/UL
NRBC BLD-RTO: 0 /100 WBC
PLATELET # BLD AUTO: 223 K/UL (ref 164–446)
PLATELET BLD QL SMEAR: NORMAL
PMV BLD AUTO: 11.5 FL (ref 9–12.9)
POIKILOCYTOSIS BLD QL SMEAR: NORMAL
POTASSIUM SERPL-SCNC: 4 MMOL/L (ref 3.6–5.5)
PROT SERPL-MCNC: 6.3 G/DL (ref 6–8.2)
RBC # BLD AUTO: 4.08 M/UL (ref 4.7–6.1)
RBC BLD AUTO: PRESENT
SODIUM SERPL-SCNC: 140 MMOL/L (ref 135–145)
TRIGL SERPL-MCNC: 91 MG/DL (ref 0–149)
TSH SERPL DL<=0.005 MIU/L-ACNC: 4.42 UIU/ML (ref 0.38–5.33)
WBC # BLD AUTO: 8.7 K/UL (ref 4.8–10.8)

## 2021-05-06 PROCEDURE — 85007 BL SMEAR W/DIFF WBC COUNT: CPT

## 2021-05-06 PROCEDURE — 36415 COLL VENOUS BLD VENIPUNCTURE: CPT

## 2021-05-06 PROCEDURE — 80061 LIPID PANEL: CPT

## 2021-05-06 PROCEDURE — 80053 COMPREHEN METABOLIC PANEL: CPT

## 2021-05-06 PROCEDURE — 84443 ASSAY THYROID STIM HORMONE: CPT

## 2021-05-06 PROCEDURE — 85027 COMPLETE CBC AUTOMATED: CPT

## 2021-05-19 ENCOUNTER — PATIENT MESSAGE (OUTPATIENT)
Dept: HEALTH INFORMATION MANAGEMENT | Facility: OTHER | Age: 81
End: 2021-05-19

## 2021-05-22 ENCOUNTER — HOSPITAL ENCOUNTER (OUTPATIENT)
Dept: RADIOLOGY | Facility: MEDICAL CENTER | Age: 81
End: 2021-05-22
Attending: FAMILY MEDICINE
Payer: MEDICARE

## 2021-05-22 DIAGNOSIS — G89.29 CHRONIC PAIN OF RIGHT KNEE: ICD-10-CM

## 2021-05-22 DIAGNOSIS — M25.461 KNEE EFFUSION, RIGHT: ICD-10-CM

## 2021-05-22 DIAGNOSIS — M25.561 CHRONIC PAIN OF RIGHT KNEE: ICD-10-CM

## 2021-05-22 PROCEDURE — 73721 MRI JNT OF LWR EXTRE W/O DYE: CPT | Mod: RT

## 2021-05-24 ENCOUNTER — HOSPITAL ENCOUNTER (OUTPATIENT)
Facility: MEDICAL CENTER | Age: 81
End: 2021-05-24
Attending: FAMILY MEDICINE
Payer: MEDICARE

## 2021-05-24 PROCEDURE — 82274 ASSAY TEST FOR BLOOD FECAL: CPT

## 2021-05-25 ENCOUNTER — TELEPHONE (OUTPATIENT)
Dept: MEDICAL GROUP | Facility: PHYSICIAN GROUP | Age: 81
End: 2021-05-25

## 2021-05-25 ENCOUNTER — NON-PROVIDER VISIT (OUTPATIENT)
Dept: WOUND CARE | Facility: MEDICAL CENTER | Age: 81
End: 2021-05-25
Attending: FAMILY MEDICINE
Payer: MEDICARE

## 2021-05-25 DIAGNOSIS — Z12.11 ENCOUNTER FOR FIT (FECAL IMMUNOCHEMICAL TEST) SCREENING: ICD-10-CM

## 2021-05-25 PROCEDURE — 99211 OFF/OP EST MAY X REQ PHY/QHP: CPT

## 2021-05-25 NOTE — CERTIFICATION
Non Provider Encounter- Lower Extremity Ulcer    HISTORY OF PRESENT ILLNESS  Wound History:    START OF CARE IN CLINIC: 5/25/21    REFERRING PROVIDER: KIM COURTNEY     WOUND ETIOLOGY: unstagable pressure injury   LOCATION: Right heel posterior   HISTORY: 80 year old male presents with right posterior heel pressure injury wound referred to [facility] from his PCP. Patient reports he has had this for 6 months and got it after being in the hospital for his right hip hemiarthroplasty by Dr. Royal on 11/28/2020. Patient was last seen in January 2021 by [facility] for treatment of his eschar on his heel.    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    FALL RISK ASSESSMENT:    x65 years or older     Fall within the last 2 years   Uses ambulatory devices  Loss of protective sensation in feet   Use of prostethic/orthotic    Presence of lower extremity/foot/toe amputation   Taking medication that increases risk (per facility policy)    Interventions Recommended (if any of the above are selected):   Use of Assistive Device:   Supervision with ambulation: Caregiver   Assistance with ambulation: Caregiver   Home safety education: Educational material provided    MOST RECENT VASCULAR STUDIES: none; palpated strong 2+ pedal pulses & doppler strong multiphasic brisk     Clinic STACIE:  Date: 5/25/21     Right         Brachial 132   STACIE 1.29       Patient allergies and medications reviewed via Epic.     WOUND ASSESSMENT      Wound 12/20/20 Heel Posterior Right (Active)   Site  Assessment Eschar 05/25/21 1515   Periwound Assessment Intact 05/25/21 1515   Margins Other (Comment) 05/25/21 1515   Closure Open to air 05/25/21 1515   Drainage Amount None 05/25/21 1515   Treatments Cleansed 05/25/21 1515   Wound Cleansing Normal Saline Irrigation 05/25/21 1515   Periwound Protectant Not Applicable 05/25/21 1515   Dressing Cleansing/Solutions 3% Betadine 05/25/21 1515   Dressing Options Open to Air 05/25/21 1515   Dressing Changed Changed 05/25/21 1515   Dressing Status Open to Air 05/25/21 1515   Dressing Change/Treatment Frequency Daily, and As Needed 05/25/21 1515   Non-staged Wound Description Full thickness 05/25/21 1515   Wound Length (cm) 1 cm 05/25/21 1515   Wound Width (cm) 0.7 cm 05/25/21 1515   Wound Surface Area (cm^2) 0.7 cm^2 05/25/21 1515   Wound Bed Eschar (%) 100 % 05/25/21 1515   Tunneling (cm) 0 cm 05/25/21 1515   Undermining (cm) 0 cm 05/25/21 1515   Wound Odor None 05/25/21 1515   Pulses 2+;Right;DP 05/25/21 1515   Exposed Structures None 05/25/21 1515     Consult with APRN; patient to return in 1 month to verify decrease in eschar size with flaking/lifting at edges or sooner if site opens.    PATIENT EDUCATION   Should you experience any significant changes in your wound(s) such as infection (redness, swelling, localized heat, increased pain, fever >101 F, chills, drainage) or have any questions regarding your home care instructions, please contact the wound center [phone]. If after hours, contact your primary care physician or go the hospital emergency room.  Apply betadine to the wound site and let dry before applying your sock.

## 2021-05-25 NOTE — PATIENT INSTRUCTIONS
Should you experience any significant changes in your wound(s) such as infection (redness, swelling, localized heat, increased pain, fever >101 F, chills, drainage) or have any questions regarding your home care instructions, please contact the wound center [phone]. If after hours, contact your primary care physician or go the hospital emergency room.  Apply betadine to the wound site and let dry before applying your sock.

## 2021-05-25 NOTE — TELEPHONE ENCOUNTER
Got a call from the lab, they received a fit test for patient but there is no order for a fit test. If you can order it please? Thank you!

## 2021-05-26 ENCOUNTER — TELEPHONE (OUTPATIENT)
Dept: MEDICAL GROUP | Facility: PHYSICIAN GROUP | Age: 81
End: 2021-05-26

## 2021-05-26 DIAGNOSIS — S83.8X9A INJURY OF MENISCUS OF KNEE, UNSPECIFIED LATERALITY, INITIAL ENCOUNTER: ICD-10-CM

## 2021-05-26 NOTE — TELEPHONE ENCOUNTER
Patient does not have an orthopedic surgeon that he can get in with for his knee. Can we get him referred to [facility] fracture?

## 2021-05-28 LAB — IMM ASSAY OCC BLD FITOB: NEGATIVE

## 2021-06-04 ENCOUNTER — PATIENT OUTREACH (OUTPATIENT)
Dept: HEALTH INFORMATION MANAGEMENT | Facility: OTHER | Age: 81
End: 2021-06-04

## 2021-06-04 NOTE — PROGRESS NOTES
Outcome: Left Message    Please transfer to Patient Outreach Team at [phone] when patient returns call  Attempt # 2

## 2021-06-21 ENCOUNTER — OFFICE VISIT (OUTPATIENT)
Dept: WOUND CARE | Facility: MEDICAL CENTER | Age: 81
End: 2021-06-21
Attending: FAMILY MEDICINE
Payer: MEDICARE

## 2021-06-21 VITALS
TEMPERATURE: 99.2 F | SYSTOLIC BLOOD PRESSURE: 121 MMHG | RESPIRATION RATE: 18 BRPM | DIASTOLIC BLOOD PRESSURE: 74 MMHG | HEART RATE: 70 BPM | OXYGEN SATURATION: 92 %

## 2021-06-21 DIAGNOSIS — L89.610 PRESSURE INJURY OF RIGHT HEEL, UNSTAGEABLE (HCC): ICD-10-CM

## 2021-06-21 PROCEDURE — 99213 OFFICE O/P EST LOW 20 MIN: CPT

## 2021-06-21 PROCEDURE — 99212 OFFICE O/P EST SF 10 MIN: CPT | Performed by: NURSE PRACTITIONER

## 2021-06-21 ASSESSMENT — ENCOUNTER SYMPTOMS
VOMITING: 0
NAUSEA: 0
COUGH: 0
SHORTNESS OF BREATH: 0
FEVER: 0
CLAUDICATION: 0
CHILLS: 0
CONSTIPATION: 0
DIARRHEA: 0

## 2021-06-21 ASSESSMENT — PAIN SCALES - GENERAL: PAINLEVEL: NO PAIN

## 2021-06-21 NOTE — PROGRESS NOTES
Provider Encounter- Pressure Injury        HISTORY OF PRESENT ILLNESS  Wound History:               START OF CARE IN CLINIC: 5/25/21               REFERRING PROVIDER: KIM COURTNEY                 WOUND ETIOLOGY: unstagable pressure injury              LOCATION: Right heel posterior              HISTORY: 80 year old male referred to [facility] for evaluation treatment of a right posterior heel pressure injury ulcer.  Patient reports that this wound started during hospitalization for a right hip hemiarthroplasty in late November 2020.  [facility] was taking care of this wound prior to patient being referred to [facility].    Pertinent Medical History: History of CVA   Contributing factors: Activity level    Personal support: None    TOBACCO USE:   Former smoker, quit in 1962.  Smoked 1 pack/day for 4 years.    Patient's problem list, allergies, and current medications reviewed and updated in Epic    Interval History:  6/21/2021 : Initial provider visit with ABHILASH Garza, DELFINA-BC, JULISAN, CFPILY.  Erasto presents today with a stable dry brown wound to his heel.  He reports has been no drainage since his last appointment.  He has been painting the wound daily with Betadine.  States he has been trying to keep his heel off the bed at night while sleeping.  He does not have a heel float boot boot.  He does have a bit of a deformity to his heel, firm prominence.  He states he had reconstructive surgery to this foot many years ago after being injured in an industrial accident.    REVIEW OF SYSTEMS:   Review of Systems   Constitutional: Negative for chills and fever.   Respiratory: Negative for cough and shortness of breath.    Cardiovascular: Negative for chest pain, claudication and leg swelling.   Gastrointestinal: Negative for constipation, diarrhea, nausea and vomiting.       PHYSICAL EXAMINATION:   /74   Pulse 70   Temp 37.3 °C (99.2 °F)   Resp 18   SpO2 92%     Physical Exam  Constitutional:        Appearance: Normal appearance. He is normal weight.   HENT:      Head: Normocephalic.   Eyes:      Pupils: Pupils are equal, round, and reactive to light.   Cardiovascular:      Rate and Rhythm: Normal rate.      Pulses: Normal pulses.      Comments: Palpable pedal pulses  Pulmonary:      Effort: Pulmonary effort is normal.   Musculoskeletal:         General: Normal range of motion.      Comments: Bony prominence to posterior right heel   Skin:     General: Skin is warm.      Comments: Stable, dry, tan/brown wound to right posterior heel.  No drainage, no periwound erythema or induration, no fluctuance  Refer to wound flowsheet and photos   Neurological:      Mental Status: He is alert.         WOUND ASSESSMENT  Wound 12/20/20 Heel Posterior Right (Active)   Wound Image   06/21/21 1115   Site Assessment Eschar 06/21/21 1115   Periwound Assessment Intact 06/21/21 1115   Margins Other (Comment) 05/25/21 1515   Closure Open to air 05/25/21 1515   Drainage Amount None 06/21/21 1115   Treatments Cleansed;Site care 06/21/21 1115   Wound Cleansing Normal Saline Irrigation 06/21/21 1115   Periwound Protectant Not Applicable 06/21/21 1115   Dressing Cleansing/Solutions 3% Betadine 06/21/21 1115   Dressing Options Open to Air 06/21/21 1115   Dressing Changed Changed 05/25/21 1515   Dressing Status Open to Air 05/25/21 1515   Dressing Change/Treatment Frequency Daily, and As Needed 05/25/21 1515   Non-staged Wound Description Full thickness 06/21/21 1115   Wound Length (cm) 1 cm 05/25/21 1515   Wound Width (cm) 0.7 cm 05/25/21 1515   Wound Surface Area (cm^2) 0.7 cm^2 05/25/21 1515   Wound Bed Eschar (%) 100 % 06/21/21 1115   Tunneling (cm) 0 cm 05/25/21 1515   Undermining (cm) 0 cm 05/25/21 1515   Wound Odor None 06/21/21 1115   Pulses 2+;Right;DP 05/25/21 1515   Exposed Structures None 06/21/21 1115   Number of days: 183       PROCEDURE:   -No need for debridement  -Open to air    Pertinent Labs and Diagnostics:    Labs:      A1c:   Lab Results   Component Value Date/Time    HBA1C 5.1 06/15/2020 05:15 AM          IMAGING: No results found.    VASCULAR STUDIES: No results found.    LAST  WOUND CULTURE: No results found for: PAYAM       ASSESSMENT AND PLAN:     1. Pressure injury of right heel, unstageable (HCC)    6/21/2021: Patient presents today with stable eschar to his right heel.  No drainage, no periwound erythema, induration, or fluctuance.  Appears to be stable.  Explained to patient that full resolution of this wound might take several months.  Standard of care for this type of wound is to offload, and manage bioburden.  As eschar lifts, we will trim in clinic.  -Debridement clinic today  -To return to clinic in 2 weeks for reassessment, will trim edges of eschar as it lives.    Wound care: Open to air          PATIENT EDUCATION  -Etiology of pressure injury  -Importance of offloading and frequent repositioning  -Strategies for offloading in bed and when seated discussed and demonstrated  - Importance of adequate nutrition for wound healing  - Advised to go to ER for any increased redness, swelling, drainage or odor, or if patient develops fever, chills, nausea or vomiting.    15 min spent face to face with patient, >50% of time spent counseling, coordinating care, reviewing records, discussing POC, educating patient regarding wound healing and progression, offloading and frequent repositioning, nutrition.  This time was spent in excess to procedure time.       Please note that this note may have been created using voice recognition software. I have worked with technical experts from [employer] to optimize the interface.  I have made every reasonable attempt to correct obvious errors, but there may be errors of grammar and possibly content that I did not discover before finalizing the note.

## 2021-06-21 NOTE — PATIENT INSTRUCTIONS
Apply betadine to wound bed as directed by provider daily.    Should you experience any significant changes in your wound(s), such as infection (redness, swelling, localized heat, increased pain, fever > 101 F, chills) or have any questions regarding your home care instructions, please contact the wound center at [phone]. If after hours, contact your primary care physician or go to the hospital emergency room.   Keep dressing clean, dry and covered while bathing. Only change dressing if it becomes over saturated, soiled or falls off.

## 2021-07-07 ENCOUNTER — NON-PROVIDER VISIT (OUTPATIENT)
Dept: WOUND CARE | Facility: MEDICAL CENTER | Age: 81
End: 2021-07-07
Attending: FAMILY MEDICINE
Payer: MEDICARE

## 2021-07-07 PROCEDURE — 99211 OFF/OP EST MAY X REQ PHY/QHP: CPT

## 2021-07-07 NOTE — PROCEDURES
Wound cleansed with washcloth and soap. Stable Eschar painted with 3% Betadine and allowed to dry fully.

## 2021-07-07 NOTE — PATIENT INSTRUCTIONS
-Keep your wound dressing clean, dry, and intact.    -Change your dressing if it becomes soiled, soaked, or falls off.    -- Resolved wound be fragile for a few days, bathe and dry area gently, only ever regains a maximum of 80% of the tensile strength of the surrounding skin, remodeling of scar can continue for 6mo - a year. Contact PCP for a referral back her if any problems with area opening and draining again.    -Should you experience any significant changes in your wound(s), such as infection (redness, swelling, localized heat, increased pain, fever > 101 F, chills) or have any questions regarding your home care instructions, please contact the wound center at [phone]. If after hours, contact your primary care physician or go to the hospital emergency room.

## 2021-07-13 ENCOUNTER — TELEPHONE (OUTPATIENT)
Dept: MEDICAL GROUP | Facility: PHYSICIAN GROUP | Age: 81
End: 2021-07-13

## 2021-07-13 NOTE — TELEPHONE ENCOUNTER
ANNUAL WELLNESS VISIT PRE-VISIT PLANNING    1.  Reviewed notes from the last office visit: Yes    2.  If any orders were ordered or intended to be done prior to visit (i.e. 6 mos follow-up), do we have results/consult notes or has patient scheduled?        •  Labs - Labs ordered, completed on 05/06/21 and results are in chart.  Note: If patient appointment is for lab review and patient did not complete labs, check with provider if OK to reschedule patient until labs completed.       •  Imaging - Imaging ordered, completed and results are in chart.       •  Referrals - Referral ordered, patient was seen and consult notes are in chart. Care Teams updated  YES.    3.  Immunizations were updated in Epic using Reconcile Outside Information activity? Yes       •  Is patient due for Tdap? NO       •  Is patient due for Shingrix? NO    4.  Patient is due for the following Health Maintenance Topics:   Health Maintenance Due   Topic Date Due   • Annual Wellness Visit  12/20/2020         5.  Reviewed/Updated the following with patient:       •   Preferred Pharmacy? Yes       •   Preferred Lab? Yes       •   Preferred Communication? Yes       •   Allergies? Yes       •   Medications? YES. Was Abstract Encounter opened and chart updated? YES       •   Social History? Yes       •   Family History (document living status of immediate family members and if + hx of  cancer, diabetes, hypertension, hyperlipidemia, heart attack, stroke) Yes    6.  Care Team Updated:       •   DME Company (gait device, O2, CPAP, etc.): N\A       •   Other Specialists (eye doctor, derm, GYN, cardiology, endo, etc): YES    7.  Patient was not advised: “This is a free wellness visit. The provider will screen for medical conditions to help you stay healthy. If you have other concerns to address you may be asked to discuss these at a separate visit or there may be an additional fee.”     8.  AHA (Puls8) form printed for Provider? Yes

## 2021-07-20 ENCOUNTER — OFFICE VISIT (OUTPATIENT)
Dept: MEDICAL GROUP | Facility: PHYSICIAN GROUP | Age: 81
End: 2021-07-20
Payer: MEDICARE

## 2021-07-20 VITALS
DIASTOLIC BLOOD PRESSURE: 70 MMHG | HEIGHT: 72 IN | BODY MASS INDEX: 23.57 KG/M2 | WEIGHT: 174 LBS | OXYGEN SATURATION: 97 % | HEART RATE: 67 BPM | TEMPERATURE: 98.6 F | SYSTOLIC BLOOD PRESSURE: 124 MMHG

## 2021-07-20 DIAGNOSIS — Z86.73 HISTORY OF CVA (CEREBROVASCULAR ACCIDENT): ICD-10-CM

## 2021-07-20 DIAGNOSIS — I47.10 SVT (SUPRAVENTRICULAR TACHYCARDIA) (HCC): ICD-10-CM

## 2021-07-20 DIAGNOSIS — Z00.00 MEDICARE ANNUAL WELLNESS VISIT, SUBSEQUENT: Primary | ICD-10-CM

## 2021-07-20 DIAGNOSIS — M32.9 PERSONAL HISTORY OF SYSTEMIC LUPUS ERYTHEMATOSUS (SLE) (HCC): ICD-10-CM

## 2021-07-20 DIAGNOSIS — E78.5 DYSLIPIDEMIA: ICD-10-CM

## 2021-07-20 DIAGNOSIS — E80.6 INDIRECT HYPERBILIRUBINEMIA: ICD-10-CM

## 2021-07-20 DIAGNOSIS — Z87.81 HISTORY OF HIP FRACTURE: ICD-10-CM

## 2021-07-20 DIAGNOSIS — I77.810 ASCENDING AORTA DILATATION (HCC): ICD-10-CM

## 2021-07-20 PROBLEM — Z84.0: Status: ACTIVE | Noted: 2021-07-20

## 2021-07-20 PROCEDURE — G0439 PPPS, SUBSEQ VISIT: HCPCS | Performed by: FAMILY MEDICINE

## 2021-07-20 PROCEDURE — 8041 PR SCP AHA: Performed by: FAMILY MEDICINE

## 2021-07-20 ASSESSMENT — ACTIVITIES OF DAILY LIVING (ADL): BATHING_REQUIRES_ASSISTANCE: 0

## 2021-07-20 ASSESSMENT — PATIENT HEALTH QUESTIONNAIRE - PHQ9: CLINICAL INTERPRETATION OF PHQ2 SCORE: 0

## 2021-07-20 ASSESSMENT — FIBROSIS 4 INDEX: FIB4 SCORE: 1.95

## 2021-07-20 ASSESSMENT — ENCOUNTER SYMPTOMS: GENERAL WELL-BEING: EXCELLENT

## 2021-07-20 NOTE — PROGRESS NOTES
Chief Complaint   Patient presents with   • Annual Wellness Visit         HPI:  Erasto is a 80 y.o. here for Medicare Annual Wellness Visit    Patient is here today for annual wellness visit.  He has no specific concerns.  He does note that he continues to recover from his hip fracture earlier in the year.  He did recently follow with Dr. Panda for his knee, as he was found to have torn meniscus.  He did not recommend any surgery at this time.  He has been using Voltaren gel which does seem to help.  Patient notes he be a little happier if he could get better range of motion of the hip but he feels that it is heading in the right direction.    Patient Active Problem List    Diagnosis Date Noted   • Family history of cutaneous lupus 07/20/2021   • SVT (supraventricular tachycardia) (HCC) 07/20/2021   • History of hip fracture 07/20/2021   • Impacted cerumen of left ear 11/16/2020   • Dyslipidemia 08/12/2020   • History of CVA (cerebrovascular accident) 06/14/2020   • Ascending aorta dilatation (HCC) 06/14/2020   • Indirect hyperbilirubinemia 12/15/2017       Current Outpatient Medications   Medication Sig Dispense Refill   • aspirin 81 MG tablet Take 81 mg by mouth every day.     • Artificial Tear Solution (SOOTHE XP XTRA PROTECTION) Solution Administer 1 Drop into both eyes as needed (dry eyes ).     • cod liver oil Cap Take 1 Cap by mouth every day.     • atorvastatin (LIPITOR) 80 MG tablet Take 1 Tab by mouth every evening. 100 Tab 2   • CALCIUM PO Take 1 Cap by mouth every evening. 600 mg     • ascorbic acid (ASCORBIC ACID) 500 MG Tab Take 1,000 mg by mouth every morning.     • multivitamin (THERAGRAN) Tab Take 1 Tab by mouth every morning.       No current facility-administered medications for this visit.        Patient is taking medications as noted in medication list.  Current supplements as per medication list.     Allergies: Patient has no known allergies.    Current social contact/activities:     Is patient  current with immunizations? Yes.    He  reports that he quit smoking about 59 years ago. His smoking use included cigarettes. He has a 4.00 pack-year smoking history. He has never used smokeless tobacco. He reports current alcohol use of about 0.6 oz of alcohol per week. He reports that he does not use drugs.  Counseling given: Not Answered  Comment: quit 1962        DPA/Advanced directive: Patient has Advanced Directive on file.     ROS:    Gait: Uses no assistive device   Ostomy: No   Other tubes: No   Amputations: No   Chronic oxygen use No   Last eye exam 2/2021  Wears hearing aids: No   : Denies any urinary leakage during the last 6 months      Screening:        Depression Screening    Little interest or pleasure in doing things?  0 - not at all  Feeling down, depressed, or hopeless? 0 - not at all  Patient Health Questionnaire Score: 0    If depressive symptoms identified deferred to follow up visit unless specifically addressed in assessment and plan.    Interpretation of PHQ-9 Total Score   Score Severity   1-4 No Depression   5-9 Mild Depression   10-14 Moderate Depression   15-19 Moderately Severe Depression   20-27 Severe Depression    Screening for Cognitive Impairment    Three Minute Recall (captain, garden, picture)  3/3    Stephan clock face with all 12 numbers and set the hands to show 5 past 8.  Yes    If cognitive concerns identified, deferred for follow up unless specifically addressed in assessment and plan.    Fall Risk Assessment    Has the patient had two or more falls in the last year or any fall with injury in the last year?  No  If fall risk identified, deferred for follow up unless specifically addressed in assessment and plan.    Safety Assessment    Throw rugs on floor.  Yes  Handrails on all stairs.  Yes  Good lighting in all hallways.  Yes  Difficulty hearing.  No  Patient counseled about all safety risks that were identified.    Functional Assessment ADLs    Are there any barriers  preventing you from cooking for yourself or meeting nutritional needs?  No.    Are there any barriers preventing you from driving safely or obtaining transportation?  No.    Are there any barriers preventing you from using a telephone or calling for help?  No.    Are there any barriers preventing you from shopping?  No.    Are there any barriers preventing you from taking care of your own finances?  No.    Are there any barriers preventing you from managing your medications?  No.    Are there any barriers preventing you from showering, bathing or dressing yourself?  No.    Are you currently engaging in any exercise or physical activity?  Yes.     What is your perception of your health?  Excellent.    Health Maintenance Summary                IMM INFLUENZA Next Due 2021      Done 2020 Imm Admin: Influenza Vaccine Adult HD     Patient has more history with this topic...    Annual Wellness Visit Next Due 2022      Done 2021 Visit Dx: Medicare annual wellness visit, subsequent     Patient has more history with this topic...    COLONOSCOPY Next Due 2025      Done 2015 REFERRAL TO GI FOR COLONOSCOPY    IMM DTaP/Tdap/Td Vaccine Next Due 2029      Done 2019 Imm Admin: Tdap Vaccine     Patient has more history with this topic...          Patient Care Team:  Ana Ohara D.O. as PCP - General (Family Medicine)  Grazyna Blood C.N.A. (Inactive) as    Fabien Tabares O.D. as Consulting Physician (Optometry)  GINA Saenz as Consulting Physician (Nurse Practitioner Family)  [facility] as Home Health Provider    Social History     Tobacco Use   • Smoking status: Former Smoker     Packs/day: 1.00     Years: 4.00     Pack years: 4.00     Types: Cigarettes     Quit date: 1962     Years since quittin.5   • Smokeless tobacco: Never Used   • Tobacco comment: quit    Vaping Use   • Vaping Use: Never used   Substance Use  Topics   • Alcohol use: Yes     Alcohol/week: 0.6 oz     Types: 1 Shots of liquor per week     Comment: 1 per week   • Drug use: No     Family History   Problem Relation Age of Onset   • Cancer Mother         skin cancer   • Stroke Mother 92   • Diabetes Mother    • Hypertension Father    • Heart Disease Father 55        father passed from heart attack   • No Known Problems Brother    • No Known Problems Brother    • Cancer Brother         throat ca   • Alcohol/Drug Brother         heavy drinker     He  has a past medical history of Cataract (01/22/2020), Dental disorder (01/22/2020), High cholesterol (01/22/2020), Hyperlipidemia (1/9/2017), and Lupus (systemic lupus erythematosus) (HCC).   Past Surgical History:   Procedure Laterality Date   • PB PARTIAL HIP REPLACEMENT Right 11/28/2020    Procedure: HEMIARTHROPLASTY, HIP;  Surgeon: Kendrick Royal M.D.;  Location: SURGERY [facility];  Service: Orthopedics   • CATARACT PHACO WITH IOL Left 2/11/2020    Procedure: PHACOEMULSIFICATION, CATARACT, WITH IOL INSERTION;  Surgeon: Kiran Jang M.D.;  Location: SURGERY SAME DAY [facility];  Service: Ophthalmology   • CATARACT PHACO WITH IOL Right 1/28/2020    Procedure: PHACOEMULSIFICATION, CATARACT, WITH IOL INSERTION;  Surgeon: Kiran Jang M.D.;  Location: SURGERY SAME DAY [facility];  Service: Ophthalmology   • OTHER SURGICAL PROCEDURE Right 1972    right heel injury   • CAST APPLICATION      climbing, collar bone injury (7th grade)   • OPEN REDUCTION             Exam:     /70 (BP Location: Left arm, Patient Position: Sitting, BP Cuff Size: Adult)   Pulse 67   Temp 37 °C (98.6 °F) (Temporal)   Ht 1.829 m (6')   Wt 78.9 kg (174 lb)   SpO2 97%  Body mass index is 23.6 kg/m².    Hearing excellent.    Dentition good  Alert, oriented in no acute distress.  Eye contact is good, speech goal directed, affect calm      Assessment and Plan. The following treatment and monitoring plan is recommended:       Erasto was seen today for annual wellness visit.    Diagnoses and all orders for this visit:    Medicare annual wellness visit, subsequent    Ascending aorta dilatation (HCC)  Noted on echo June 2020.  We will repeat echo at this time.  Aorta with last diameter of 3.8 cm.  -     EC-ECHOCARDIOGRAM COMPLETE W/O CONT; Future    Dyslipidemia  Chronic issue, well controlled on atorvastatin.    History of CVA (cerebrovascular accident)  Notes some mild issues with memory, but overall no residual deficits.  He does take atorvastatin and baby aspirin daily.    Indirect hyperbilirubinemia  Benign issue.  Continue to monitor.    Personal history of systemic lupus erythematosus  Patient notes that remotely he had a rash on his face which was biopsied and found to be lupus.  He was briefly on Plaquenil.  No recurrence of symptoms and not currently undergoing treatment.    SVT (supraventricular tachycardia) (HCC)  Patient was noted to have short runs of atrial tachycardia on Zio patch monitor.  He did see cardiology who ultimately recommended 30-day event monitor or loop recording.  Patient declined at that time.  He has no symptoms of palpitation, racing heart, shortness of breath, lightheadedness.    History of hip fracture  Recent issue, overall recovering well.  Still working on range of motion but he is otherwise quite active.    Services suggested: No services needed at this time  Health Care Screening recommendations as per orders if indicated.  Referrals offered: PT/OT/Nutrition counseling/Behavioral Health/Smoking cessation as per orders if indicated.    Discussion today about general wellness and lifestyle habits:    · Prevent falls and reduce trip hazards; Cautioned about securing or removing rugs.  · Have a working fire alarm and carbon monoxide detector;   · Engage in regular physical activity and social activities       Follow-up: Return in about 6 months (around 1/20/2022).

## 2021-07-21 ENCOUNTER — NON-PROVIDER VISIT (OUTPATIENT)
Dept: WOUND CARE | Facility: MEDICAL CENTER | Age: 81
End: 2021-07-21
Attending: FAMILY MEDICINE
Payer: MEDICARE

## 2021-07-21 PROCEDURE — 99211 OFF/OP EST MAY X REQ PHY/QHP: CPT

## 2021-07-21 NOTE — PATIENT INSTRUCTIONS
-Paint the eschar on your right heel with Betadine daily, and allow it to dry before you put your sock on.    -Should you experience any significant changes in your wound, such as signs of infection (redness, swelling, localized heat, increased pain, fever > 101 F, chills) or have any questions regarding your home care instructions, please contact the wound center at [phone]. If after hours, contact your primary care physician or go to the hospital emergency room.     -If you are 5 or more minutes late for an appointment, we reserve the right to cancel and reschedule that appointment. Additionally, if you are habitually late or not showing (3 late cancellations and/or no shows), we reserve the right to cancel your remaining appointments and it will be your responsibility to obtain a new referral if services are still needed.      \

## 2021-08-05 ENCOUNTER — OFFICE VISIT (OUTPATIENT)
Dept: WOUND CARE | Facility: MEDICAL CENTER | Age: 81
End: 2021-08-05
Attending: FAMILY MEDICINE
Payer: MEDICARE

## 2021-08-05 VITALS
DIASTOLIC BLOOD PRESSURE: 83 MMHG | RESPIRATION RATE: 17 BRPM | TEMPERATURE: 98.6 F | HEART RATE: 66 BPM | OXYGEN SATURATION: 94 % | SYSTOLIC BLOOD PRESSURE: 134 MMHG

## 2021-08-05 DIAGNOSIS — L89.610 PRESSURE INJURY OF RIGHT HEEL, UNSTAGEABLE (HCC): Primary | ICD-10-CM

## 2021-08-05 PROCEDURE — 11055 PARING/CUTG B9 HYPRKER LES 1: CPT | Performed by: NURSE PRACTITIONER

## 2021-08-05 PROCEDURE — 11055 PARING/CUTG B9 HYPRKER LES 1: CPT

## 2021-08-05 ASSESSMENT — ENCOUNTER SYMPTOMS
CONSTIPATION: 0
CLAUDICATION: 0
COUGH: 0
NAUSEA: 0
VOMITING: 0
DIARRHEA: 0
CHILLS: 0
SHORTNESS OF BREATH: 0
FEVER: 0

## 2021-08-05 NOTE — PROGRESS NOTES
Provider Encounter- Pressure Injury        HISTORY OF PRESENT ILLNESS  Wound History:               START OF CARE IN CLINIC: 5/25/21               REFERRING PROVIDER: KIM COURTNEY                 WOUND ETIOLOGY: unstagable pressure injury              LOCATION: Right heel posterior              HISTORY: 80 year old male referred to [facility] for evaluation treatment of a right posterior heel pressure injury ulcer.  Patient reports that this wound started during hospitalization for a right hip hemiarthroplasty in late November 2020.  [facility] was taking care of this wound prior to patient being referred to [facility].    Pertinent Medical History: History of CVA   Contributing factors: Activity level    Personal support: None    TOBACCO USE:   Former smoker, quit in 1962.  Smoked 1 pack/day for 4 years.    Patient's problem list, allergies, and current medications reviewed and updated in Epic    Interval History:  6/21/2021 : Initial provider visit with ABHILASH Garza, CASSIDY, BERT, NAPOLEON.  Erasto presents today with a stable dry brown wound to his heel.  He reports has been no drainage since his last appointment.  He has been painting the wound daily with Betadine.  States he has been trying to keep his heel off the bed at night while sleeping.  He does not have a heel float boot boot.  He does have a bit of a deformity to his heel, firm prominence.  He states he had reconstructive surgery to this foot many years ago after being injured in an industrial accident.    8/5/2021: Clinic visit with ABHILASH Connolly. Patient states that they are feeling well today.  Patient denies fever, chills, nausea, vomiting, lightheadedness, dizziness, shortness of breath and chest pain.  Removed callus and dried eschar, patient has no open wounds patient will be discharged from [facility] at this time    REVIEW OF SYSTEMS:   Review of Systems   Constitutional: Negative for chills and fever.   Respiratory: Negative for  cough and shortness of breath.    Cardiovascular: Negative for chest pain, claudication and leg swelling.   Gastrointestinal: Negative for constipation, diarrhea, nausea and vomiting.       PHYSICAL EXAMINATION:   /83 (BP Location: Right arm, Patient Position: Sitting, BP Cuff Size: Adult)   Pulse 66   Temp 37 °C (98.6 °F) (Temporal)   Resp 17   SpO2 94%     Physical Exam  Constitutional:       Appearance: Normal appearance. He is normal weight.   HENT:      Head: Normocephalic.   Eyes:      Pupils: Pupils are equal, round, and reactive to light.   Cardiovascular:      Rate and Rhythm: Normal rate.      Pulses: Normal pulses.      Comments: Palpable pedal pulses  Pulmonary:      Effort: Pulmonary effort is normal.   Musculoskeletal:         General: Normal range of motion.      Comments: Bony prominence to posterior right heel   Skin:     General: Skin is warm.      Comments: Callus and dry eschar resolved     Neurological:      Mental Status: He is alert.           PROCEDURE:   Patient has no open wounds at this time following removal of callus and eschar.  1 callus pared down with curette and emery board to skin level.    Pertinent Labs and Diagnostics:    Labs:     A1c:   Lab Results   Component Value Date/Time    HBA1C 5.1 06/15/2020 05:15 AM          IMAGING: No results found.    VASCULAR STUDIES: No results found.    LAST  WOUND CULTURE: No results found for: CULTRSULT       ASSESSMENT AND PLAN:     1. Pressure injury of right heel, unstageable ([facility])    08/05/21: Wound has resolved following removal of callus and dried eschar.  Patient has no open wounds at this time patient will be discharged from [facility].    Wound care: Patient instructed to apply petroleum-based product to heel to soften callus.  Patient further instructed that if he is going to be wearing his shoes all day to apply some moleskin over the area to prevent friction and shear and further breakdown or callus formation.      PATIENT  EDUCATION  -Etiology of pressure injury  -Importance of offloading and frequent repositioning  -Strategies for offloading in bed and when seated discussed and demonstrated  - Importance of adequate nutrition for wound healing  - Advised to go to ER for any increased redness, swelling, drainage or odor, or if patient develops fever, chills, nausea or vomiting.        Please note that this note may have been created using voice recognition software. I have worked with technical experts from [facility] to optimize the interface.  I have made every reasonable attempt to correct obvious errors, but there may be errors of grammar and possibly content that I did not discover before finalizing the note.

## 2021-08-12 RX ORDER — ATORVASTATIN CALCIUM 80 MG/1
80 TABLET, FILM COATED ORAL EVERY EVENING
Qty: 100 TABLET | Refills: 2 | Status: CANCELLED | OUTPATIENT
Start: 2021-08-12 | End: 2022-08-12

## 2021-08-12 NOTE — TELEPHONE ENCOUNTER
Patient states he doesn't need to neurology and would like you to fill rx.  Received request via: Patient    Was the patient seen in the last year in this department? Yes    Does the patient have an active prescription (recently filled or refills available) for medication(s) requested? No

## 2021-08-13 PROCEDURE — RXMED WILLOW AMBULATORY MEDICATION CHARGE: Performed by: FAMILY MEDICINE

## 2021-08-13 RX ORDER — ATORVASTATIN CALCIUM 80 MG/1
80 TABLET, FILM COATED ORAL EVERY EVENING
Qty: 100 TABLET | Refills: 2 | Status: SHIPPED | OUTPATIENT
Start: 2021-08-13 | End: 2022-08-13

## 2021-08-16 ENCOUNTER — PHARMACY VISIT (OUTPATIENT)
Dept: PHARMACY | Facility: MEDICAL CENTER | Age: 81
End: 2021-08-16
Payer: COMMERCIAL

## 2021-08-16 PROCEDURE — RXMED WILLOW AMBULATORY MEDICATION CHARGE: Performed by: NURSE PRACTITIONER

## 2021-09-14 PROCEDURE — RXMED WILLOW AMBULATORY MEDICATION CHARGE: Performed by: FAMILY MEDICINE

## 2021-09-14 RX ORDER — ASPIRIN 81 MG/1
81 TABLET ORAL DAILY
Qty: 100 TABLET | Refills: 3 | Status: SHIPPED
Start: 2021-09-14 | End: 2021-09-14

## 2021-09-14 RX ORDER — ASPIRIN 81 MG/1
81 TABLET ORAL DAILY
Qty: 100 TABLET | Refills: 3 | Status: SHIPPED | OUTPATIENT
Start: 2021-09-14 | End: 2023-01-01

## 2021-09-14 NOTE — TELEPHONE ENCOUNTER
Hello,     Pt requesting med refill to be sent to [facility].      Thank you.

## 2021-09-14 NOTE — TELEPHONE ENCOUNTER
Please request 100 day supply     Received request via: Patient    Was the patient seen in the last year in this department? Yes    Does the patient have an active prescription (recently filled or refills available) for medication(s) requested? No

## 2021-09-15 ENCOUNTER — PHARMACY VISIT (OUTPATIENT)
Dept: PHARMACY | Facility: MEDICAL CENTER | Age: 81
End: 2021-09-15
Payer: COMMERCIAL

## 2021-10-15 ENCOUNTER — HOSPITAL ENCOUNTER (OUTPATIENT)
Dept: CARDIOLOGY | Facility: MEDICAL CENTER | Age: 81
End: 2021-10-15
Attending: FAMILY MEDICINE
Payer: MEDICARE

## 2021-10-15 DIAGNOSIS — I77.810 ASCENDING AORTA DILATATION (HCC): ICD-10-CM

## 2021-10-15 LAB
LV EJECT FRACT  99904: 60
LV EJECT FRACT MOD 2C 99903: 65.02
LV EJECT FRACT MOD 4C 99902: 46.56
LV EJECT FRACT MOD BP 99901: 57.37

## 2021-10-15 PROCEDURE — 93306 TTE W/DOPPLER COMPLETE: CPT

## 2021-10-15 PROCEDURE — 93306 TTE W/DOPPLER COMPLETE: CPT | Mod: 26 | Performed by: INTERNAL MEDICINE

## 2021-11-01 ENCOUNTER — TELEPHONE (OUTPATIENT)
Dept: MEDICAL GROUP | Facility: PHYSICIAN GROUP | Age: 81
End: 2021-11-01

## 2021-11-10 ENCOUNTER — OFFICE VISIT (OUTPATIENT)
Dept: MEDICAL GROUP | Facility: PHYSICIAN GROUP | Age: 81
End: 2021-11-10
Payer: MEDICARE

## 2021-11-10 VITALS
OXYGEN SATURATION: 96 % | HEIGHT: 72 IN | WEIGHT: 160 LBS | DIASTOLIC BLOOD PRESSURE: 62 MMHG | BODY MASS INDEX: 21.67 KG/M2 | HEART RATE: 61 BPM | SYSTOLIC BLOOD PRESSURE: 122 MMHG | TEMPERATURE: 97.8 F

## 2021-11-10 DIAGNOSIS — Z87.81 HISTORY OF HIP FRACTURE: ICD-10-CM

## 2021-11-10 DIAGNOSIS — E78.5 DYSLIPIDEMIA: ICD-10-CM

## 2021-11-10 DIAGNOSIS — Z00.00 PREVENTATIVE HEALTH CARE: ICD-10-CM

## 2021-11-10 DIAGNOSIS — I77.810 ASCENDING AORTA DILATATION (HCC): ICD-10-CM

## 2021-11-10 DIAGNOSIS — Z86.73 HISTORY OF CVA (CEREBROVASCULAR ACCIDENT): ICD-10-CM

## 2021-11-10 PROCEDURE — 99214 OFFICE O/P EST MOD 30 MIN: CPT | Performed by: FAMILY MEDICINE

## 2021-11-10 ASSESSMENT — FIBROSIS 4 INDEX: FIB4 SCORE: 1.97

## 2021-11-10 NOTE — PROGRESS NOTES
CC: High cholesterol    HISTORY OF THE PRESENT ILLNESS: Patient is a 81 y.o. male.     Patient is here today for routine follow-up on chronic health conditions including aortic dilation, dyslipidemia, history of stroke and history of hip fracture.    Allergies: Patient has no known allergies.    Current Outpatient Medications Ordered in Epic   Medication Sig Dispense Refill   • aspirin (ASPIRIN 81) 81 MG EC tablet Take 1 Tablet by mouth every day. 100 Tablet 3   • atorvastatin (LIPITOR) 80 MG tablet Take 1 Tablet by mouth every evening. 100 Tablet 2     No current Epic-ordered facility-administered medications on file.       Past Medical History:   Diagnosis Date   • Cataract 01/22/2020    bilateral   • Dental disorder 01/22/2020    upper and lower dentures   • High cholesterol 01/22/2020    on med   • Hyperlipidemia 1/9/2017    Pravastin x 10 yrs    • Lupus (systemic lupus erythematosus) (HCC)     Pt reports only symptom was rash, on plaquenil for several yrs, stopped 2004       Past Surgical History:   Procedure Laterality Date   • PB PARTIAL HIP REPLACEMENT Right 11/28/2020    Procedure: HEMIARTHROPLASTY, HIP;  Surgeon: Kendrick Royal M.D.;  Location: SURGERY [facility];  Service: Orthopedics   • CATARACT PHACO WITH IOL Left 2/11/2020    Procedure: PHACOEMULSIFICATION, CATARACT, WITH IOL INSERTION;  Surgeon: Kiran Jang M.D.;  Location: SURGERY SAME DAY [facility];  Service: Ophthalmology   • CATARACT PHACO WITH IOL Right 1/28/2020    Procedure: PHACOEMULSIFICATION, CATARACT, WITH IOL INSERTION;  Surgeon: Kiran Jang M.D.;  Location: SURGERY SAME DAY [facility];  Service: Ophthalmology   • OTHER SURGICAL PROCEDURE Right 1972    right heel injury   • CAST APPLICATION      climbing, collar bone injury (7th grade)   • OPEN REDUCTION         Social History     Tobacco Use   • Smoking status: Former Smoker     Packs/day: 1.00     Years: 4.00     Pack years: 4.00     Types: Cigarettes     Quit date:  1962     Years since quittin.8   • Smokeless tobacco: Never Used   • Tobacco comment: quit    Vaping Use   • Vaping Use: Never used   Substance Use Topics   • Alcohol use: Yes     Alcohol/week: 0.6 oz     Types: 1 Shots of liquor per week     Comment: 1 per week   • Drug use: No       Social History     Social History Narrative   • Not on file       Family History   Problem Relation Age of Onset   • Cancer Mother         skin cancer   • Stroke Mother 92   • Diabetes Mother    • Hypertension Father    • Heart Disease Father 55        father passed from heart attack   • No Known Problems Brother    • No Known Problems Brother    • Cancer Brother         throat ca   • Alcohol/Drug Brother         heavy drinker       Exam: /62 (BP Location: Right arm, Patient Position: Sitting, BP Cuff Size: Adult)   Pulse 61   Temp 36.6 °C (97.8 °F) (Temporal)   Ht 1.829 m (6')   Wt 72.6 kg (160 lb)   SpO2 96%  Body mass index is 21.7 kg/m².    General: Well appearing, NAD  HEENT: Normocephalic. Conjunctiva clear, lids without ptosis, pupils equal and reactive to light accommodation, ears normal shape and contour, canals are clear bilaterally, tympanic membranes without bulging or erythema and normal cone of light  Neck: Supple without JVD. No thyromegaly or nodules  Pulmonary: Clear to ausculation.  Normal effort. No rales, ronchi, or wheezing.  Cardiovascular: Regular rate and rhythm without murmur, rubs or gallop.   Abdomen: Soft, nontender, nondistended. Normal bowel sounds. Liver and spleen are not palpable. No rebound or guarding  Neurologic: normal gait  Lymph: No cervical, supraclavicular lymph nodes are palpable  Skin: Warm and dry.  No obvious lesions.  Musculoskeletal:  No extremity cyanosis, clubbing, or edema.  Psych: Normal mood and affect. Alert and oriented. Judgment and insight is normal.    Please note that this dictation was created using voice recognition software. I have made every  reasonable attempt to correct obvious errors, but I expect that there are errors of grammar and possibly content that I did not discover before finalizing the note.      Assessment/Plan  Erasto was seen today for follow-up and patient question.    Diagnoses and all orders for this visit:    History of CVA (cerebrovascular accident)  Remote history of CVA on past medical history.  Patient is currently only on high intensity statin and baby aspirin.  He has heard the new recommendations coming out regarding aspirin, he has questions about whether or not he should be on aspirin.  We did discuss that given that he has had a history of stroke, this does qualify him for statin and aspirin for life.    Dyslipidemia  Chronic issue, has historically been well controlled as of most recent lab work in May.  Continue statin.  Recheck lipid profile prior to next visit.  -     Lipid Profile; Future    Preventative health care  -     CBC WITH DIFFERENTIAL; Future  -     Comp Metabolic Panel; Future  -     Lipid Profile; Future  -     VITAMIN D,25 HYDROXY; Future  -     TSH WITH REFLEX TO FT4; Future    History of hip fracture  Patient continues to recover well.  Notes some intermittent mild pain if he walks too much.  He no longer needs to follow with orthopedics.    Ascending aorta dilatation (HCC)  Had repeat cardiac echo recently showing that dilation had enlarged from 3.8 to 4.1 cm.  Recommend continued yearly monitoring.    Follow-up for routine care in 6 months or sooner if needed.    Ana Ohara,   [facility]

## 2021-11-12 ENCOUNTER — HOSPITAL ENCOUNTER (OUTPATIENT)
Dept: LAB | Facility: MEDICAL CENTER | Age: 81
End: 2021-11-12
Attending: FAMILY MEDICINE
Payer: MEDICARE

## 2021-11-12 DIAGNOSIS — Z00.00 PREVENTATIVE HEALTH CARE: ICD-10-CM

## 2021-11-12 DIAGNOSIS — E78.5 DYSLIPIDEMIA: ICD-10-CM

## 2021-11-12 LAB
ALBUMIN SERPL BCP-MCNC: 3.9 G/DL (ref 3.2–4.9)
ALBUMIN/GLOB SERPL: 1.5 G/DL
ALP SERPL-CCNC: 85 U/L (ref 30–99)
ALT SERPL-CCNC: 25 U/L (ref 2–50)
ANION GAP SERPL CALC-SCNC: 10 MMOL/L (ref 7–16)
ANISOCYTOSIS BLD QL SMEAR: ABNORMAL
AST SERPL-CCNC: 30 U/L (ref 12–45)
BASOPHILS # BLD AUTO: 0 % (ref 0–1.8)
BASOPHILS # BLD: 0 K/UL (ref 0–0.12)
BILIRUB SERPL-MCNC: 1.6 MG/DL (ref 0.1–1.5)
BUN SERPL-MCNC: 29 MG/DL (ref 8–22)
BURR CELLS BLD QL SMEAR: NORMAL
CALCIUM SERPL-MCNC: 9.3 MG/DL (ref 8.5–10.5)
CHLORIDE SERPL-SCNC: 112 MMOL/L (ref 96–112)
CHOLEST SERPL-MCNC: 116 MG/DL (ref 100–199)
CO2 SERPL-SCNC: 23 MMOL/L (ref 20–33)
CREAT SERPL-MCNC: 1.07 MG/DL (ref 0.5–1.4)
EOSINOPHIL # BLD AUTO: 0.19 K/UL (ref 0–0.51)
EOSINOPHIL NFR BLD: 1.8 % (ref 0–6.9)
ERYTHROCYTE [DISTWIDTH] IN BLOOD BY AUTOMATED COUNT: 56.5 FL (ref 35.9–50)
GLOBULIN SER CALC-MCNC: 2.6 G/DL (ref 1.9–3.5)
GLUCOSE SERPL-MCNC: 104 MG/DL (ref 65–99)
HCT VFR BLD AUTO: 42.5 % (ref 42–52)
HDLC SERPL-MCNC: 34 MG/DL
HGB BLD-MCNC: 13.9 G/DL (ref 14–18)
LDLC SERPL CALC-MCNC: 68 MG/DL
LYMPHOCYTES # BLD AUTO: 5.49 K/UL (ref 1–4.8)
LYMPHOCYTES NFR BLD: 51.8 % (ref 22–41)
MACROCYTES BLD QL SMEAR: ABNORMAL
MANUAL DIFF BLD: NORMAL
MCH RBC QN AUTO: 33.3 PG (ref 27–33)
MCHC RBC AUTO-ENTMCNC: 32.7 G/DL (ref 33.7–35.3)
MCV RBC AUTO: 101.7 FL (ref 81.4–97.8)
MICROCYTES BLD QL SMEAR: ABNORMAL
MONOCYTES # BLD AUTO: 1.04 K/UL (ref 0–0.85)
MONOCYTES NFR BLD AUTO: 9.8 % (ref 0–13.4)
MORPHOLOGY BLD-IMP: NORMAL
NEUTROPHILS # BLD AUTO: 3.88 K/UL (ref 1.82–7.42)
NEUTROPHILS NFR BLD: 36.6 % (ref 44–72)
NRBC # BLD AUTO: 0 K/UL
NRBC BLD-RTO: 0 /100 WBC
OVALOCYTES BLD QL SMEAR: NORMAL
PATH REV: NORMAL
PLATELET # BLD AUTO: 199 K/UL (ref 164–446)
PLATELET BLD QL SMEAR: NORMAL
PMV BLD AUTO: 11.5 FL (ref 9–12.9)
POIKILOCYTOSIS BLD QL SMEAR: NORMAL
POTASSIUM SERPL-SCNC: 4.5 MMOL/L (ref 3.6–5.5)
PROT SERPL-MCNC: 6.5 G/DL (ref 6–8.2)
RBC # BLD AUTO: 4.18 M/UL (ref 4.7–6.1)
RBC BLD AUTO: PRESENT
SMUDGE CELLS BLD QL SMEAR: NORMAL
SODIUM SERPL-SCNC: 145 MMOL/L (ref 135–145)
TRIGL SERPL-MCNC: 68 MG/DL (ref 0–149)
TSH SERPL DL<=0.005 MIU/L-ACNC: 3.54 UIU/ML (ref 0.38–5.33)
WBC # BLD AUTO: 10.6 K/UL (ref 4.8–10.8)

## 2021-11-12 PROCEDURE — 36415 COLL VENOUS BLD VENIPUNCTURE: CPT

## 2021-11-12 PROCEDURE — 80500 HCHG CLINICAL PATH CONSULT-LIMITED: CPT

## 2021-11-12 PROCEDURE — 85007 BL SMEAR W/DIFF WBC COUNT: CPT

## 2021-11-12 PROCEDURE — 84443 ASSAY THYROID STIM HORMONE: CPT

## 2021-11-12 PROCEDURE — 82306 VITAMIN D 25 HYDROXY: CPT

## 2021-11-12 PROCEDURE — 80053 COMPREHEN METABOLIC PANEL: CPT

## 2021-11-12 PROCEDURE — 85027 COMPLETE CBC AUTOMATED: CPT

## 2021-11-12 PROCEDURE — 80061 LIPID PANEL: CPT

## 2021-11-15 LAB — 25(OH)D3 SERPL-MCNC: 32 NG/ML (ref 30–80)

## 2021-11-16 PROCEDURE — RXMED WILLOW AMBULATORY MEDICATION CHARGE: Performed by: FAMILY MEDICINE

## 2021-11-18 ENCOUNTER — PHARMACY VISIT (OUTPATIENT)
Dept: PHARMACY | Facility: MEDICAL CENTER | Age: 81
End: 2021-11-18
Payer: COMMERCIAL

## 2021-12-01 PROCEDURE — RXMED WILLOW AMBULATORY MEDICATION CHARGE: Performed by: FAMILY MEDICINE

## 2021-12-04 ENCOUNTER — PHARMACY VISIT (OUTPATIENT)
Dept: PHARMACY | Facility: MEDICAL CENTER | Age: 81
End: 2021-12-04
Payer: COMMERCIAL

## 2021-12-14 ENCOUNTER — TELEPHONE (OUTPATIENT)
Dept: MEDICAL GROUP | Facility: PHYSICIAN GROUP | Age: 81
End: 2021-12-14

## 2021-12-14 NOTE — TELEPHONE ENCOUNTER
ESTABLISHED PATIENT PRE-VISIT PLANNING     Patient was NOT contacted to complete PVP.     Note: Patient will not be contacted if there is no indication to call.     1.  Reviewed notes from the last few office visits within the medical group: Yes    2.  If any orders were placed at last visit or intended to be done for this visit (i.e. 6 mos follow-up), do we have Results/Consult Notes?         •  Labs - Labs ordered, completed on 11/12/21 and results are in chart.  Note: If patient appointment is for lab review and patient did not complete labs, check with provider if OK to reschedule patient until labs completed.       •  Imaging - Imaging was not ordered at last office visit.       •  Referrals - No referrals were ordered at last office visit.    3. Is this appointment scheduled as a Hospital Follow-Up? No    4.  Immunizations were updated in Epic using Reconcile Outside Information activity? Yes    5.  Patient is due for the following Health Maintenance Topics:   Health Maintenance Due   Topic Date Due   • IMM INFLUENZA (1) 09/01/2021       6.  AHA (Pulse8) form printed for Provider? Yes

## 2021-12-21 ENCOUNTER — OFFICE VISIT (OUTPATIENT)
Dept: MEDICAL GROUP | Facility: PHYSICIAN GROUP | Age: 81
End: 2021-12-21
Payer: MEDICARE

## 2021-12-21 VITALS
OXYGEN SATURATION: 92 % | HEART RATE: 86 BPM | HEIGHT: 72 IN | WEIGHT: 162 LBS | TEMPERATURE: 97.6 F | SYSTOLIC BLOOD PRESSURE: 104 MMHG | BODY MASS INDEX: 21.94 KG/M2 | DIASTOLIC BLOOD PRESSURE: 60 MMHG

## 2021-12-21 DIAGNOSIS — E78.5 DYSLIPIDEMIA: ICD-10-CM

## 2021-12-21 DIAGNOSIS — D64.9 ANEMIA, UNSPECIFIED TYPE: ICD-10-CM

## 2021-12-21 DIAGNOSIS — Z86.73 HISTORY OF CVA (CEREBROVASCULAR ACCIDENT): ICD-10-CM

## 2021-12-21 PROBLEM — D64.89 OTHER SPECIFIED ANEMIAS: Status: ACTIVE | Noted: 2021-12-21

## 2021-12-21 PROCEDURE — 99214 OFFICE O/P EST MOD 30 MIN: CPT | Performed by: FAMILY MEDICINE

## 2021-12-21 ASSESSMENT — FIBROSIS 4 INDEX: FIB4 SCORE: 2.47

## 2021-12-21 NOTE — PROGRESS NOTES
CC: Lab review    HISTORY OF THE PRESENT ILLNESS: Patient is a 81 y.o. male.     Patient is here today for lab review.    Since our last visit, he has been doing well.  He continues to struggle with me pain and stiffness but has recently joined a gym and began working with a .    In reviewing his lab work, he does continue to have chronic anemia.  Remainder of lab work looked excellent.    Allergies: Patient has no known allergies.    Current Outpatient Medications Ordered in Epic   Medication Sig Dispense Refill   • aspirin (ASPIRIN 81) 81 MG EC tablet Take 1 Tablet by mouth every day. 100 Tablet 3   • atorvastatin (LIPITOR) 80 MG tablet Take 1 Tablet by mouth every evening. 100 Tablet 2     No current Epic-ordered facility-administered medications on file.       Past Medical History:   Diagnosis Date   • Cataract 01/22/2020    bilateral   • Dental disorder 01/22/2020    upper and lower dentures   • High cholesterol 01/22/2020    on med   • Hyperlipidemia 1/9/2017    Pravastin x 10 yrs    • Lupus (systemic lupus erythematosus) (HCC)     Pt reports only symptom was rash, on plaquenil for several yrs, stopped 2004       Past Surgical History:   Procedure Laterality Date   • PB PARTIAL HIP REPLACEMENT Right 11/28/2020    Procedure: HEMIARTHROPLASTY, HIP;  Surgeon: Kendrick Royal M.D.;  Location: SURGERY [facility];  Service: Orthopedics   • CATARACT PHACO WITH IOL Left 2/11/2020    Procedure: PHACOEMULSIFICATION, CATARACT, WITH IOL INSERTION;  Surgeon: Kiran Jang M.D.;  Location: SURGERY SAME DAY [facility];  Service: Ophthalmology   • CATARACT PHACO WITH IOL Right 1/28/2020    Procedure: PHACOEMULSIFICATION, CATARACT, WITH IOL INSERTION;  Surgeon: Kiran Jang M.D.;  Location: SURGERY SAME DAY [facility];  Service: Ophthalmology   • OTHER SURGICAL PROCEDURE Right 1972    right heel injury   • CAST APPLICATION      climbing, collar bone injury (7th grade)   • OPEN REDUCTION         Social  History     Tobacco Use   • Smoking status: Former Smoker     Packs/day: 1.00     Years: 4.00     Pack years: 4.00     Types: Cigarettes     Quit date: 1962     Years since quittin.0   • Smokeless tobacco: Never Used   • Tobacco comment: quit    Vaping Use   • Vaping Use: Never used   Substance Use Topics   • Alcohol use: Yes     Alcohol/week: 0.6 oz     Types: 1 Shots of liquor per week     Comment: 1 per week   • Drug use: No       Social History     Social History Narrative   • Not on file       Family History   Problem Relation Age of Onset   • Cancer Mother         skin cancer   • Stroke Mother 92   • Diabetes Mother    • Hypertension Father    • Heart Disease Father 55        father passed from heart attack   • No Known Problems Brother    • No Known Problems Brother    • Cancer Brother         throat ca   • Alcohol/Drug Brother         heavy drinker     Labs: Labs reviewed from 2021.      Exam: /60 (BP Location: Left arm, Patient Position: Sitting, BP Cuff Size: Adult)   Pulse 86   Temp 36.4 °C (97.6 °F) (Temporal)   Ht 1.829 m (6')   Wt 73.5 kg (162 lb)   SpO2 92%  Body mass index is 21.97 kg/m².    General: Well appearing, NAD  Pulmonary: Clear to ausculation.  Normal effort. No rales, ronchi, or wheezing.  Cardiovascular: Regular rate and rhythm without murmur, rubs or gallop.   Psych: Normal mood and affect. Alert and oriented. Judgment and insight is normal.    Please note that this dictation was created using voice recognition software. I have made every reasonable attempt to correct obvious errors, but I expect that there are errors of grammar and possibly content that I did not discover before finalizing the note.      Assessment/Plan  Erasto was seen today for follow-up.    Diagnoses and all orders for this visit:    Anemia, unspecified type  Patient has had anemia for at least the past year and a half though quite mild.  He actually has trended upward in recent  months with his hemoglobin.  He did have a negative fit test in May 2021.  I suspect his anemia is actually because he donates blood on a regular basis.  Will check additional labs as below prior to next visit.  -     VITAMIN B12; Future  -     CBC WITH DIFFERENTIAL; Future  -     FOLATE; Future  -     IRON/TOTAL IRON BIND; Future  -     FERRITIN; Future    History of CVA (cerebrovascular accident)  Patient has remote history of stroke.  He does take a high intensity statin.  He recently heard on the news that he should discontinue his aspirin.  Discussed that recommendation is not meant for people with strokes I recommend continuing his aspirin and statin at this time.    Dyslipidemia  Well-controlled on statin in reviewing his recent lab work.    Follow-up in 6 months with Dr. Montoya or sooner if needed.    Ana Ohara,   [facility]

## 2022-02-11 ENCOUNTER — PATIENT MESSAGE (OUTPATIENT)
Dept: HEALTH INFORMATION MANAGEMENT | Facility: OTHER | Age: 82
End: 2022-02-11
Payer: MEDICARE

## 2022-03-10 ENCOUNTER — PHARMACY VISIT (OUTPATIENT)
Dept: PHARMACY | Facility: MEDICAL CENTER | Age: 82
End: 2022-03-10
Payer: COMMERCIAL

## 2022-03-10 PROCEDURE — RXMED WILLOW AMBULATORY MEDICATION CHARGE: Performed by: FAMILY MEDICINE

## 2022-07-08 ENCOUNTER — TELEPHONE (OUTPATIENT)
Dept: HEALTH INFORMATION MANAGEMENT | Facility: OTHER | Age: 82
End: 2022-07-08

## 2023-01-01 ENCOUNTER — PRE-ADMISSION TESTING (OUTPATIENT)
Dept: ADMISSIONS | Facility: MEDICAL CENTER | Age: 83
End: 2023-01-01
Payer: COMMERCIAL

## 2023-01-01 ENCOUNTER — HOSPITAL ENCOUNTER (OUTPATIENT)
Dept: HEMATOLOGY ONCOLOGY | Facility: MEDICAL CENTER | Age: 83
End: 2023-01-11
Attending: INTERNAL MEDICINE
Payer: COMMERCIAL

## 2023-01-01 ENCOUNTER — HOSPITAL ENCOUNTER (OUTPATIENT)
Dept: HEMATOLOGY ONCOLOGY | Facility: MEDICAL CENTER | Age: 83
End: 2023-06-07
Attending: INTERNAL MEDICINE
Payer: COMMERCIAL

## 2023-01-01 ENCOUNTER — HOSPITAL ENCOUNTER (OUTPATIENT)
Dept: LAB | Facility: MEDICAL CENTER | Age: 83
End: 2023-11-02
Attending: INTERNAL MEDICINE
Payer: COMMERCIAL

## 2023-01-01 ENCOUNTER — HOSPITAL ENCOUNTER (OUTPATIENT)
Dept: HEMATOLOGY ONCOLOGY | Facility: MEDICAL CENTER | Age: 83
End: 2023-08-08
Attending: INTERNAL MEDICINE
Payer: COMMERCIAL

## 2023-01-01 ENCOUNTER — HOSPITAL ENCOUNTER (OUTPATIENT)
Dept: LAB | Facility: MEDICAL CENTER | Age: 83
End: 2023-01-11
Attending: INTERNAL MEDICINE
Payer: COMMERCIAL

## 2023-01-01 ENCOUNTER — HOSPITAL ENCOUNTER (OUTPATIENT)
Dept: HEMATOLOGY ONCOLOGY | Facility: MEDICAL CENTER | Age: 83
End: 2023-05-12
Attending: INTERNAL MEDICINE
Payer: COMMERCIAL

## 2023-01-01 ENCOUNTER — HOSPITAL ENCOUNTER (OUTPATIENT)
Dept: HEMATOLOGY ONCOLOGY | Facility: MEDICAL CENTER | Age: 83
End: 2023-11-06
Attending: INTERNAL MEDICINE
Payer: COMMERCIAL

## 2023-01-01 ENCOUNTER — HOSPITAL ENCOUNTER (OUTPATIENT)
Dept: LAB | Facility: MEDICAL CENTER | Age: 83
End: 2023-05-10
Attending: INTERNAL MEDICINE
Payer: COMMERCIAL

## 2023-01-01 ENCOUNTER — HOSPITAL ENCOUNTER (OUTPATIENT)
Dept: LAB | Facility: MEDICAL CENTER | Age: 83
End: 2023-08-01
Attending: INTERNAL MEDICINE
Payer: COMMERCIAL

## 2023-01-01 ENCOUNTER — HOSPITAL ENCOUNTER (OUTPATIENT)
Dept: LAB | Facility: MEDICAL CENTER | Age: 83
End: 2023-10-02
Attending: INTERNAL MEDICINE
Payer: COMMERCIAL

## 2023-01-01 ENCOUNTER — TELEPHONE (OUTPATIENT)
Dept: HEMATOLOGY ONCOLOGY | Facility: MEDICAL CENTER | Age: 83
End: 2023-01-01
Payer: COMMERCIAL

## 2023-01-01 ENCOUNTER — HOSPITAL ENCOUNTER (OUTPATIENT)
Dept: LAB | Facility: MEDICAL CENTER | Age: 83
End: 2023-05-12
Attending: INTERNAL MEDICINE
Payer: COMMERCIAL

## 2023-01-01 ENCOUNTER — HOSPITAL ENCOUNTER (OUTPATIENT)
Facility: MEDICAL CENTER | Age: 83
End: 2023-05-18
Attending: INTERNAL MEDICINE | Admitting: INTERNAL MEDICINE
Payer: COMMERCIAL

## 2023-01-01 ENCOUNTER — HOSPITAL ENCOUNTER (OUTPATIENT)
Dept: HEMATOLOGY ONCOLOGY | Facility: MEDICAL CENTER | Age: 83
End: 2023-02-08
Attending: INTERNAL MEDICINE
Payer: COMMERCIAL

## 2023-01-01 VITALS
RESPIRATION RATE: 39 BRPM | HEART RATE: 61 BPM | SYSTOLIC BLOOD PRESSURE: 172 MMHG | HEIGHT: 71 IN | DIASTOLIC BLOOD PRESSURE: 81 MMHG | BODY MASS INDEX: 20.56 KG/M2 | TEMPERATURE: 97.6 F | WEIGHT: 146.83 LBS | OXYGEN SATURATION: 96 %

## 2023-01-01 VITALS
RESPIRATION RATE: 18 BRPM | TEMPERATURE: 97.9 F | OXYGEN SATURATION: 98 % | HEART RATE: 79 BPM | HEIGHT: 72 IN | SYSTOLIC BLOOD PRESSURE: 172 MMHG | WEIGHT: 156.86 LBS | DIASTOLIC BLOOD PRESSURE: 90 MMHG | BODY MASS INDEX: 21.25 KG/M2

## 2023-01-01 VITALS
TEMPERATURE: 98.2 F | BODY MASS INDEX: 20.57 KG/M2 | OXYGEN SATURATION: 95 % | WEIGHT: 146.94 LBS | SYSTOLIC BLOOD PRESSURE: 158 MMHG | RESPIRATION RATE: 15 BRPM | DIASTOLIC BLOOD PRESSURE: 62 MMHG | HEIGHT: 71 IN | HEART RATE: 74 BPM

## 2023-01-01 VITALS
SYSTOLIC BLOOD PRESSURE: 164 MMHG | RESPIRATION RATE: 13 BRPM | DIASTOLIC BLOOD PRESSURE: 72 MMHG | OXYGEN SATURATION: 97 % | WEIGHT: 139.11 LBS | BODY MASS INDEX: 19.48 KG/M2 | TEMPERATURE: 98.4 F | HEART RATE: 78 BPM | HEIGHT: 71 IN

## 2023-01-01 VITALS
DIASTOLIC BLOOD PRESSURE: 80 MMHG | WEIGHT: 143.52 LBS | RESPIRATION RATE: 12 BRPM | OXYGEN SATURATION: 97 % | TEMPERATURE: 98.9 F | SYSTOLIC BLOOD PRESSURE: 178 MMHG | HEART RATE: 62 BPM | HEIGHT: 71 IN | BODY MASS INDEX: 20.09 KG/M2

## 2023-01-01 VITALS
RESPIRATION RATE: 18 BRPM | TEMPERATURE: 98.2 F | WEIGHT: 157.41 LBS | BODY MASS INDEX: 21.32 KG/M2 | HEART RATE: 66 BPM | DIASTOLIC BLOOD PRESSURE: 84 MMHG | SYSTOLIC BLOOD PRESSURE: 154 MMHG | HEIGHT: 72 IN | OXYGEN SATURATION: 96 %

## 2023-01-01 VITALS
RESPIRATION RATE: 15 BRPM | OXYGEN SATURATION: 95 % | TEMPERATURE: 97.8 F | SYSTOLIC BLOOD PRESSURE: 150 MMHG | WEIGHT: 148.48 LBS | HEART RATE: 69 BPM | DIASTOLIC BLOOD PRESSURE: 70 MMHG | HEIGHT: 72 IN | BODY MASS INDEX: 20.11 KG/M2

## 2023-01-01 VITALS — BODY MASS INDEX: 19.6 KG/M2 | HEIGHT: 71 IN | WEIGHT: 140 LBS

## 2023-01-01 DIAGNOSIS — D64.9 ANEMIA, UNSPECIFIED TYPE: ICD-10-CM

## 2023-01-01 DIAGNOSIS — D72.820 LYMPHOCYTOSIS: ICD-10-CM

## 2023-01-01 DIAGNOSIS — C91.10 CLL (CHRONIC LYMPHOCYTIC LEUKEMIA) (HCC): ICD-10-CM

## 2023-01-01 DIAGNOSIS — E80.6 HYPERBILIRUBINEMIA: ICD-10-CM

## 2023-01-01 DIAGNOSIS — E80.6 INDIRECT HYPERBILIRUBINEMIA: ICD-10-CM

## 2023-01-01 DIAGNOSIS — D75.89 MACROCYTOSIS: ICD-10-CM

## 2023-01-01 LAB
ALBUMIN SERPL ELPH-MCNC: 3.61 G/DL (ref 3.75–5.01)
ALPHA1 GLOB SERPL ELPH-MCNC: 0.32 G/DL (ref 0.19–0.46)
ALPHA2 GLOB SERPL ELPH-MCNC: 0.72 G/DL (ref 0.48–1.05)
ANISOCYTOSIS BLD QL SMEAR: ABNORMAL
B-GLOBULIN SERPL ELPH-MCNC: 0.56 G/DL (ref 0.48–1.1)
BASOPHILS # BLD AUTO: 0 % (ref 0–1.8)
BASOPHILS # BLD AUTO: 0.4 % (ref 0–1.8)
BASOPHILS # BLD: 0 K/UL (ref 0–0.12)
BASOPHILS # BLD: 0.08 K/UL (ref 0–0.12)
BLD GP AB SCN SERPL QL: NORMAL
BLD GP AB SCN SERPL QL: NORMAL
BURR CELLS BLD QL SMEAR: NORMAL
BURR CELLS BLD QL SMEAR: NORMAL
CHROM ANALY INTERPHASE BLD/MAR FISH-IMP: NORMAL
COMMENT 1642: NORMAL
COMMENT NL1176: NORMAL
EER MONOCLONAL PROTEIN AND FLC, SERUM Q5224: ABNORMAL
EOSINOPHIL # BLD AUTO: 0 K/UL (ref 0–0.51)
EOSINOPHIL # BLD AUTO: 0.18 K/UL (ref 0–0.51)
EOSINOPHIL # BLD AUTO: 0.37 K/UL (ref 0–0.51)
EOSINOPHIL NFR BLD: 0 % (ref 0–6.9)
EOSINOPHIL NFR BLD: 0.9 % (ref 0–6.9)
EOSINOPHIL NFR BLD: 0.9 % (ref 0–6.9)
ERYTHROCYTE [DISTWIDTH] IN BLOOD BY AUTOMATED COUNT: 51.9 FL (ref 35.9–50)
ERYTHROCYTE [DISTWIDTH] IN BLOOD BY AUTOMATED COUNT: 53.6 FL (ref 35.9–50)
ERYTHROCYTE [DISTWIDTH] IN BLOOD BY AUTOMATED COUNT: 54.4 FL (ref 35.9–50)
ERYTHROCYTE [DISTWIDTH] IN BLOOD BY AUTOMATED COUNT: 55 FL (ref 35.9–50)
ERYTHROCYTE [DISTWIDTH] IN BLOOD BY AUTOMATED COUNT: 56 FL (ref 35.9–50)
ERYTHROCYTE [DISTWIDTH] IN BLOOD BY AUTOMATED COUNT: 58.6 FL (ref 35.9–50)
GAMMA GLOB SERPL ELPH-MCNC: 0.79 G/DL (ref 0.62–1.51)
HAPTOGLOB SERPL-MCNC: 106 MG/DL (ref 30–200)
HCT VFR BLD AUTO: 30.5 % (ref 42–52)
HCT VFR BLD AUTO: 31 % (ref 42–52)
HCT VFR BLD AUTO: 32.8 % (ref 42–52)
HCT VFR BLD AUTO: 32.9 % (ref 42–52)
HCT VFR BLD AUTO: 33.4 % (ref 42–52)
HCT VFR BLD AUTO: 35.4 % (ref 42–52)
HGB BLD-MCNC: 10.2 G/DL (ref 14–18)
HGB BLD-MCNC: 10.4 G/DL (ref 14–18)
HGB BLD-MCNC: 10.4 G/DL (ref 14–18)
HGB BLD-MCNC: 10.5 G/DL (ref 14–18)
HGB BLD-MCNC: 11.4 G/DL (ref 14–18)
HGB BLD-MCNC: 9.7 G/DL (ref 14–18)
HGB RETIC QN AUTO: 35.1 PG/CELL (ref 29–35)
HGB RETIC QN AUTO: 36 PG/CELL (ref 29–35)
HGB RETIC QN AUTO: 37.4 PG/CELL (ref 29–35)
IGA SERPL-MCNC: 68 MG/DL (ref 68–408)
IGG SERPL-MCNC: 802 MG/DL (ref 768–1632)
IGM SERPL-MCNC: 177 MG/DL (ref 35–263)
IGVH GENE MUT ANL BLD/T: NORMAL
IMM GRANULOCYTES # BLD AUTO: 0.06 K/UL (ref 0–0.11)
IMM GRANULOCYTES NFR BLD AUTO: 0.3 % (ref 0–0.9)
IMM RETICS NFR: 16.8 % (ref 9.3–17.4)
IMM RETICS NFR: 17.3 % (ref 9.3–17.4)
IMM RETICS NFR: 19.4 % (ref 9.3–17.4)
INTERPRETATION SERPL IFE-IMP: ABNORMAL
INTERPRETATION SERPL IFE-IMP: ABNORMAL
KAPPA LC FREE SER-MCNC: 194.85 MG/L (ref 3.3–19.4)
KAPPA LC FREE/LAMBDA FREE SER NEPH: 18.75 {RATIO} (ref 0.26–1.65)
LAMBDA LC FREE SERPL-MCNC: 10.39 MG/L (ref 5.71–26.3)
LDH SERPL L TO P-CCNC: 226 U/L (ref 107–266)
LDH SERPL L TO P-CCNC: 252 U/L (ref 107–266)
LDH SERPL L TO P-CCNC: 261 U/L (ref 107–266)
LYMPHOCYTES # BLD AUTO: 14.72 K/UL (ref 1–4.8)
LYMPHOCYTES # BLD AUTO: 21.41 K/UL (ref 1–4.8)
LYMPHOCYTES # BLD AUTO: 24.4 K/UL (ref 1–4.8)
LYMPHOCYTES # BLD AUTO: 32.51 K/UL (ref 1–4.8)
LYMPHOCYTES # BLD AUTO: 32.57 K/UL (ref 1–4.8)
LYMPHOCYTES # BLD AUTO: 36.08 K/UL (ref 1–4.8)
LYMPHOCYTES NFR BLD: 73.6 % (ref 22–41)
LYMPHOCYTES NFR BLD: 78.7 % (ref 22–41)
LYMPHOCYTES NFR BLD: 79.3 % (ref 22–41)
LYMPHOCYTES NFR BLD: 79.6 % (ref 22–41)
LYMPHOCYTES NFR BLD: 84.6 % (ref 22–41)
LYMPHOCYTES NFR BLD: 88 % (ref 22–41)
MACROCYTES BLD QL SMEAR: ABNORMAL
MANUAL DIFF BLD: NORMAL
MCH RBC QN AUTO: 33.2 PG (ref 27–33)
MCH RBC QN AUTO: 33.2 PG (ref 27–33)
MCH RBC QN AUTO: 33.4 PG (ref 27–33)
MCH RBC QN AUTO: 33.5 PG (ref 27–33)
MCH RBC QN AUTO: 33.7 PG (ref 27–33)
MCH RBC QN AUTO: 33.9 PG (ref 27–33)
MCHC RBC AUTO-ENTMCNC: 31.1 G/DL (ref 32.3–36.5)
MCHC RBC AUTO-ENTMCNC: 31.6 G/DL (ref 32.3–36.5)
MCHC RBC AUTO-ENTMCNC: 31.8 G/DL (ref 32.3–36.5)
MCHC RBC AUTO-ENTMCNC: 32 G/DL (ref 33.7–35.3)
MCHC RBC AUTO-ENTMCNC: 32.2 G/DL (ref 33.7–35.3)
MCHC RBC AUTO-ENTMCNC: 32.9 G/DL (ref 33.7–35.3)
MCV RBC AUTO: 103 FL (ref 81.4–97.8)
MCV RBC AUTO: 103.2 FL (ref 81.4–97.8)
MCV RBC AUTO: 105.1 FL (ref 81.4–97.8)
MCV RBC AUTO: 105.2 FL (ref 81.4–97.8)
MCV RBC AUTO: 106.1 FL (ref 81.4–97.8)
MCV RBC AUTO: 106.7 FL (ref 81.4–97.8)
MONOCLONAL PROTEIN NL11656: ABNORMAL G/DL
MONOCYTES # BLD AUTO: 0.31 K/UL (ref 0–0.85)
MONOCYTES # BLD AUTO: 0.5 K/UL (ref 0–0.85)
MONOCYTES # BLD AUTO: 0.73 K/UL (ref 0–0.85)
MONOCYTES # BLD AUTO: 0.98 K/UL (ref 0–0.85)
MONOCYTES # BLD AUTO: 1.02 K/UL (ref 0–0.85)
MONOCYTES # BLD AUTO: 2.21 K/UL (ref 0–0.85)
MONOCYTES NFR BLD AUTO: 0.8 % (ref 0–13.4)
MONOCYTES NFR BLD AUTO: 1.6 % (ref 0–13.4)
MONOCYTES NFR BLD AUTO: 2.4 % (ref 0–13.4)
MONOCYTES NFR BLD AUTO: 2.7 % (ref 0–13.4)
MONOCYTES NFR BLD AUTO: 5.1 % (ref 0–13.4)
MONOCYTES NFR BLD AUTO: 5.4 % (ref 0–13.4)
MORPHOLOGY BLD-IMP: NORMAL
NEUTROPHILS # BLD AUTO: 3.93 K/UL (ref 1.82–7.42)
NEUTROPHILS # BLD AUTO: 3.94 K/UL (ref 1.82–7.42)
NEUTROPHILS # BLD AUTO: 4.76 K/UL (ref 1.82–7.42)
NEUTROPHILS # BLD AUTO: 5.62 K/UL (ref 1.82–7.42)
NEUTROPHILS # BLD AUTO: 5.9 K/UL (ref 1.82–7.42)
NEUTROPHILS # BLD AUTO: 6.11 K/UL (ref 1.82–7.42)
NEUTROPHILS NFR BLD: 14.4 % (ref 44–72)
NEUTROPHILS NFR BLD: 14.6 % (ref 44–72)
NEUTROPHILS NFR BLD: 17.7 % (ref 44–72)
NEUTROPHILS NFR BLD: 19.7 % (ref 44–72)
NEUTROPHILS NFR BLD: 19.7 % (ref 44–72)
NEUTROPHILS NFR BLD: 9.6 % (ref 44–72)
NRBC # BLD AUTO: 0 K/UL
NRBC # BLD AUTO: 0.03 K/UL
NRBC BLD-RTO: 0 /100 WBC
NRBC BLD-RTO: 0 /100 WBC
NRBC BLD-RTO: 0 /100 WBC (ref 0–0.2)
NRBC BLD-RTO: 0.1 /100 WBC
OVALOCYTES BLD QL SMEAR: NORMAL
OVALOCYTES BLD QL SMEAR: NORMAL
PATHOLOGY CONSULT NOTE: NORMAL
PATHOLOGY STUDY: NORMAL
PLATELET # BLD AUTO: 166 K/UL (ref 164–446)
PLATELET # BLD AUTO: 175 K/UL (ref 164–446)
PLATELET # BLD AUTO: 189 K/UL (ref 164–446)
PLATELET # BLD AUTO: 202 K/UL (ref 164–446)
PLATELET # BLD AUTO: 209 K/UL (ref 164–446)
PLATELET # BLD AUTO: 238 K/UL (ref 164–446)
PLATELET BLD QL SMEAR: NORMAL
PMV BLD AUTO: 10.8 FL (ref 9–12.9)
PMV BLD AUTO: 11 FL (ref 9–12.9)
PMV BLD AUTO: 11.1 FL (ref 9–12.9)
PMV BLD AUTO: 11.2 FL (ref 9–12.9)
PMV BLD AUTO: 11.2 FL (ref 9–12.9)
PMV BLD AUTO: 11.4 FL (ref 9–12.9)
POIKILOCYTOSIS BLD QL SMEAR: NORMAL
POLYCHROMASIA BLD QL SMEAR: NORMAL
PROT SERPL-MCNC: 6 G/DL (ref 6.3–8.2)
RBC # BLD AUTO: 2.9 M/UL (ref 4.7–6.1)
RBC # BLD AUTO: 3.01 M/UL (ref 4.7–6.1)
RBC # BLD AUTO: 3.1 M/UL (ref 4.7–6.1)
RBC # BLD AUTO: 3.12 M/UL (ref 4.7–6.1)
RBC # BLD AUTO: 3.13 M/UL (ref 4.7–6.1)
RBC # BLD AUTO: 3.43 M/UL (ref 4.7–6.1)
RBC BLD AUTO: NORMAL
RBC BLD AUTO: PRESENT
RETICS # AUTO: 0.05 M/UL (ref 0.04–0.06)
RETICS # AUTO: 0.05 M/UL (ref 0.04–0.06)
RETICS # AUTO: 0.06 M/UL (ref 0.04–0.06)
RETICS/RBC NFR: 1.5 % (ref 0.8–2.1)
RETICS/RBC NFR: 1.6 % (ref 0.8–2.1)
RETICS/RBC NFR: 2.2 % (ref 0.8–2.1)
SMUDGE CELLS BLD QL SMEAR: NORMAL
WBC # BLD AUTO: 20 K/UL (ref 4.8–10.8)
WBC # BLD AUTO: 26.9 K/UL (ref 4.8–10.8)
WBC # BLD AUTO: 31 K/UL (ref 4.8–10.8)
WBC # BLD AUTO: 38.5 K/UL (ref 4.8–10.8)
WBC # BLD AUTO: 41 K/UL (ref 4.8–10.8)
WBC # BLD AUTO: 41 K/UL (ref 4.8–10.8)

## 2023-01-01 PROCEDURE — 99212 OFFICE O/P EST SF 10 MIN: CPT | Performed by: INTERNAL MEDICINE

## 2023-01-01 PROCEDURE — 85025 COMPLETE CBC W/AUTO DIFF WBC: CPT

## 2023-01-01 PROCEDURE — 84165 PROTEIN E-PHORESIS SERUM: CPT

## 2023-01-01 PROCEDURE — 88184 FLOWCYTOMETRY/ TC 1 MARKER: CPT

## 2023-01-01 PROCEDURE — 160002 HCHG RECOVERY MINUTES (STAT): Performed by: HOSPITALIST

## 2023-01-01 PROCEDURE — 700111 HCHG RX REV CODE 636 W/ 250 OVERRIDE (IP): Performed by: HOSPITALIST

## 2023-01-01 PROCEDURE — 36415 COLL VENOUS BLD VENIPUNCTURE: CPT

## 2023-01-01 PROCEDURE — 85007 BL SMEAR W/DIFF WBC COUNT: CPT

## 2023-01-01 PROCEDURE — 88237 TISSUE CULTURE BONE MARROW: CPT

## 2023-01-01 PROCEDURE — 86850 RBC ANTIBODY SCREEN: CPT

## 2023-01-01 PROCEDURE — 83615 LACTATE (LD) (LDH) ENZYME: CPT

## 2023-01-01 PROCEDURE — 82784 ASSAY IGA/IGD/IGG/IGM EACH: CPT

## 2023-01-01 PROCEDURE — 81263 IGH VARI REGIONAL MUTATION: CPT

## 2023-01-01 PROCEDURE — 1126F AMNT PAIN NOTED NONE PRSNT: CPT | Performed by: INTERNAL MEDICINE

## 2023-01-01 PROCEDURE — 99203 OFFICE O/P NEW LOW 30 MIN: CPT | Performed by: INTERNAL MEDICINE

## 2023-01-01 PROCEDURE — 160025 RECOVERY II MINUTES (STATS): Performed by: HOSPITALIST

## 2023-01-01 PROCEDURE — 3077F SYST BP >= 140 MM HG: CPT | Performed by: INTERNAL MEDICINE

## 2023-01-01 PROCEDURE — 88342 IMHCHEM/IMCYTCHM 1ST ANTB: CPT

## 2023-01-01 PROCEDURE — 160048 HCHG OR STATISTICAL LEVEL 1-5: Performed by: HOSPITALIST

## 2023-01-01 PROCEDURE — 99214 OFFICE O/P EST MOD 30 MIN: CPT | Performed by: INTERNAL MEDICINE

## 2023-01-01 PROCEDURE — 160035 HCHG PACU - 1ST 60 MINS PHASE I: Performed by: HOSPITALIST

## 2023-01-01 PROCEDURE — 99152 MOD SED SAME PHYS/QHP 5/>YRS: CPT | Mod: 59 | Performed by: HOSPITALIST

## 2023-01-01 PROCEDURE — 88305 TISSUE EXAM BY PATHOLOGIST: CPT

## 2023-01-01 PROCEDURE — 88185 FLOWCYTOMETRY/TC ADD-ON: CPT

## 2023-01-01 PROCEDURE — 160046 HCHG PACU - 1ST 60 MINS PHASE II: Performed by: HOSPITALIST

## 2023-01-01 PROCEDURE — 88264 CHROMOSOME ANALYSIS 20-25: CPT

## 2023-01-01 PROCEDURE — 85046 RETICYTE/HGB CONCENTRATE: CPT

## 2023-01-01 PROCEDURE — 83010 ASSAY OF HAPTOGLOBIN QUANT: CPT

## 2023-01-01 PROCEDURE — 88275 CYTOGENETICS 100-300: CPT | Mod: 91

## 2023-01-01 PROCEDURE — 38222 DX BONE MARROW BX & ASPIR: CPT | Mod: LT | Performed by: HOSPITALIST

## 2023-01-01 PROCEDURE — 88341 IMHCHEM/IMCYTCHM EA ADD ANTB: CPT

## 2023-01-01 PROCEDURE — 99213 OFFICE O/P EST LOW 20 MIN: CPT | Performed by: INTERNAL MEDICINE

## 2023-01-01 PROCEDURE — 3078F DIAST BP <80 MM HG: CPT | Performed by: INTERNAL MEDICINE

## 2023-01-01 PROCEDURE — 86334 IMMUNOFIX E-PHORESIS SERUM: CPT

## 2023-01-01 PROCEDURE — 160027 HCHG SURGERY MINUTES - 1ST 30 MINS LEVEL 2: Performed by: HOSPITALIST

## 2023-01-01 PROCEDURE — 85027 COMPLETE CBC AUTOMATED: CPT

## 2023-01-01 PROCEDURE — 85007 BL SMEAR W/DIFF WBC COUNT: CPT | Mod: XU

## 2023-01-01 PROCEDURE — 83521 IG LIGHT CHAINS FREE EACH: CPT

## 2023-01-01 PROCEDURE — 88271 CYTOGENETICS DNA PROBE: CPT

## 2023-01-01 PROCEDURE — 99152 MOD SED SAME PHYS/QHP 5/>YRS: CPT | Performed by: HOSPITALIST

## 2023-01-01 PROCEDURE — 88311 DECALCIFY TISSUE: CPT

## 2023-01-01 PROCEDURE — 88360 TUMOR IMMUNOHISTOCHEM/MANUAL: CPT

## 2023-01-01 PROCEDURE — 84155 ASSAY OF PROTEIN SERUM: CPT

## 2023-01-01 PROCEDURE — 88313 SPECIAL STAINS GROUP 2: CPT

## 2023-01-01 RX ORDER — MIDAZOLAM HYDROCHLORIDE 1 MG/ML
.5-2 INJECTION INTRAMUSCULAR; INTRAVENOUS PRN
Status: DISCONTINUED | OUTPATIENT
Start: 2023-01-01 | End: 2023-01-01 | Stop reason: HOSPADM

## 2023-01-01 RX ORDER — LOSARTAN POTASSIUM 50 MG/1
50 TABLET ORAL DAILY
COMMUNITY

## 2023-01-01 RX ORDER — SODIUM CHLORIDE, SODIUM LACTATE, POTASSIUM CHLORIDE, CALCIUM CHLORIDE 600; 310; 30; 20 MG/100ML; MG/100ML; MG/100ML; MG/100ML
INJECTION, SOLUTION INTRAVENOUS CONTINUOUS
Status: DISCONTINUED | OUTPATIENT
Start: 2023-01-01 | End: 2023-01-01 | Stop reason: HOSPADM

## 2023-01-01 RX ORDER — MULTIVIT WITH MINERALS/LUTEIN
TABLET ORAL
COMMUNITY

## 2023-01-01 RX ORDER — ATORVASTATIN CALCIUM 40 MG/1
40 TABLET, FILM COATED ORAL NIGHTLY
COMMUNITY

## 2023-01-01 RX ORDER — MIDAZOLAM HYDROCHLORIDE 1 MG/ML
INJECTION INTRAMUSCULAR; INTRAVENOUS
Status: DISCONTINUED
Start: 2023-01-01 | End: 2023-01-01 | Stop reason: HOSPADM

## 2023-01-01 RX ORDER — SODIUM CHLORIDE 9 MG/ML
500 INJECTION, SOLUTION INTRAVENOUS
Status: DISCONTINUED | OUTPATIENT
Start: 2023-01-01 | End: 2023-01-01 | Stop reason: HOSPADM

## 2023-01-01 ASSESSMENT — PAIN DESCRIPTION - PAIN TYPE
TYPE: SURGICAL PAIN

## 2023-01-01 ASSESSMENT — PAIN SCALES - GENERAL
PAINLEVEL: NO PAIN

## 2023-01-01 ASSESSMENT — FIBROSIS 4 INDEX
FIB4 SCORE: 2.666666666666666667
FIB4 SCORE: 2.85
FIB4 SCORE: 2.5
FIB4 SCORE: 2.47
FIB4 SCORE: 2.38
FIB4 SCORE: 2.47
FIB4 SCORE: 3

## 2023-01-01 ASSESSMENT — PATIENT HEALTH QUESTIONNAIRE - PHQ9: CLINICAL INTERPRETATION OF PHQ2 SCORE: 0

## 2023-01-06 NOTE — PROGRESS NOTES
"01/11/23    Subjective    Chief Complaint:  Lymphocytosis    HPI:  82 male [facility] lymphocyte count. Records accompanying the referral include a flow cytometry from a right axillary lymph node needle bx showing a population of CD5+, CD10-, CD38-  lymphocytes most suggestive of CLL although Mantle cell could not be r/o'd. CBC showed a WBC of 19.36 with a normal H/H and platelet count. Prior WBC's earlier in 2022 were in the 16K range. Also has had an elevated indirect bilirubin so hemolysis needs to be ruled out. No \"B\" sx's.    ROS:    Constitutional: No weight loss  Skin: No rash or jaundice  HENT: No change in eyesight or hearing  Cardiovascular:No chest pain or arrythmia  Respiratory:No cough or SOB  GI:No nausea, vomiting, diarrhea, constipation  :No dysuria or frequency  Musculoskeletal:No bone or joint pain  Neuro:No sx's of neuropathy  Psych: No complaints    PMH:      No Known Allergies    Past Medical History:   Diagnosis Date    Cataract 01/22/2020    bilateral    Dental disorder 01/22/2020    upper and lower dentures    High cholesterol 01/22/2020    on med    Hyperlipidemia 1/9/2017    Pravastin x 10 yrs     Lupus (systemic lupus erythematosus) (HCC)     Pt reports only symptom was rash, on plaquenil for several yrs, stopped 2004        Past Surgical History:   Procedure Laterality Date    PB PARTIAL HIP REPLACEMENT Right 11/28/2020    Procedure: HEMIARTHROPLASTY, HIP;  Surgeon: Kendrick Royal M.D.;  Location: SURGERY [facility];  Service: Orthopedics    CATARACT PHACO WITH IOL Left 2/11/2020    Procedure: PHACOEMULSIFICATION, CATARACT, WITH IOL INSERTION;  Surgeon: Kiran Jang M.D.;  Location: SURGERY SAME DAY [facility];  Service: Ophthalmology    CATARACT PHACO WITH IOL Right 1/28/2020    Procedure: PHACOEMULSIFICATION, CATARACT, WITH IOL INSERTION;  Surgeon: Kiarn Jang M.D.;  [facility];  Service: Ophthalmology    OTHER SURGICAL PROCEDURE Right     right heel injury    CAST APPLICATION      climbing, collar bone injury (7th grade)    OPEN REDUCTION          Medications:    Current Outpatient Medications on File Prior to Encounter   Medication Sig Dispense Refill    atorvastatin (LIPITOR) 40 MG Tab Take 40 mg by mouth every evening.      losartan (COZAAR) 50 MG Tab Take 50 mg by mouth every day.      Ascorbic Acid (VITAMIN C) 1000 MG Tab Take  by mouth.      aspirin (ASPIRIN 81) 81 MG EC tablet Take 1 Tablet by mouth every day. (Patient not taking: Reported on 2023) 100 Tablet 3     No current facility-administered medications on file prior to encounter.       Social History     Tobacco Use    Smoking status: Former     Packs/day: 1.00     Years: 4.00     Pack years: 4.00     Types: Cigarettes     Quit date: 1962     Years since quittin.0    Smokeless tobacco: Never    Tobacco comments:     quit    Substance Use Topics    Alcohol use: Yes     Alcohol/week: 0.6 oz     Types: 1 Shots of liquor per week     Comment: 1 per week        Family History   Problem Relation Age of Onset    Cancer Mother         skin cancer    Stroke Mother 92    Diabetes Mother     Hypertension Father     Heart Disease Father 55        father passed from heart attack    No Known Problems Brother     No Known Problems Brother     Cancer Brother         throat ca    Alcohol/Drug Brother         heavy drinker        Objective    Vitals:    BP (!) 154/84 (BP Location: Right arm, Patient Position: Sitting, BP Cuff Size: Adult)   Pulse 66   Temp 36.8 °C (98.2 °F) (Temporal)   Resp 18   Ht 1.829 m (6' 0.01\")   Wt 71.4 kg (157 lb 6.5 oz)   SpO2 96%   BMI 21.34 kg/m²     Physical Exam:    Appears well-developed and well-nourished. No distress.    Head -  Normocephalic .   Eyes - Pupils are equal. Conjunctivae normal. No scleral icterus.   Ears - normal hearing  Neck - Neck supple. No " thyromegaly  Cardiovascular - Normal rate, regular rhythm, normal heart sounds and intact distal pulses. No  gallop, murmur or rub  Pulmonary - Normal breath sounds.  No wheeze, rales or rhonchi  Abdominal -Soft. No distension, tenderness, organomegaly or mass. LUQ fullness but cannot delineate a spleen  Extremities-  No edema or tenderness.    Nodes - Bilateral cervical and axillary nodes. Right axillary node quite large.   Neurological -   Alert and oriented.  Skin - Skin is warm and dry. No rash noted. Not diaphoretic. No erythema. No pallor. No jaundice   Psychiatric -  Normal mood and affect.    Labs:      Assessment  Probably CLL   Imp:    Visit Diagnosis:    1. Lymphocytosis  CBC WITH DIFFERENTIAL    LDH    IGHV MUTATION ANALYSIS BY SEQUENCING    Chromosome FISH, CLL Panel    ANTIBODY SCREEN    Monoclonal Protein and FLC, Serum      2. Hyperbilirubinemia  LDH    RETICULOCYTES COUNT      3. Indirect hyperbilirubinemia  ANTIBODY SCREEN          Plan:  Above lab  Discussed lack of obvious indications to initiate therapy  RTC 3 weeks    Emile Hughes M.D.

## 2023-01-25 NOTE — PROGRESS NOTES
02/08/23    Subjective    Chief Complaint:  Follow up from consultation for a lymphoproliferative disorder    HPI:  82 [facility] 1/11/23. [facility] with flow cytometry showing and CD 5+, CD 10-, CD38- lymphoproliferative disorder most consistent with CLL although Mantle Cell could not be r/o'd. His CLL chromosome panel is normal and IGHV not mutated. Immunoelectrophoresis shows an elevated kappa/lambda ratio but no monoclonal spike and normal gamma globulins. He has become slightly anemic in the past 18 months. Platelet count is stable.     ROS:    Constitutional: No weight loss  Skin: No rash or jaundice  HENT: No change in eyesight or hearing  Cardiovascular:No chest pain or arrythmia  Respiratory:No cough or SOB  GI:No nausea, vomiting, diarrhea, constipation  :No dysuria or frequency  Musculoskeletal:No bone or joint pain  Neuro:No sx's of neuropathy  Psych: No complaints    PMH:      No Known Allergies    Past Medical History:   Diagnosis Date    Cataract 01/22/2020    bilateral    Dental disorder 01/22/2020    upper and lower dentures    High cholesterol 01/22/2020    on med    Hyperlipidemia 1/9/2017    Pravastin x 10 yrs     Lupus (systemic lupus erythematosus) (HCC)     Pt reports only symptom was rash, on plaquenil for several yrs, stopped 2004        Past Surgical History:   Procedure Laterality Date    PB PARTIAL HIP REPLACEMENT Right 11/28/2020    Procedure: HEMIARTHROPLASTY, HIP;  Surgeon: Kendrick Royal M.D.;  Location: [facility];  Service: Orthopedics    CATARACT PHACO WITH IOL Left 2/11/2020    Procedure: PHACOEMULSIFICATION, CATARACT, WITH IOL INSERTION;  Surgeon: Kiran Jang M.D.;  Location: SURGERY SAME DAY [facility];  Service: Ophthalmology    CATARACT PHACO WITH IOL Right 1/28/2020    Procedure: PHACOEMULSIFICATION, CATARACT, WITH IOL INSERTION;  Surgeon: Kiran Jang M.D.;  Location: SURGERY SAME DAY [facility];  " Service: Ophthalmology    OTHER SURGICAL PROCEDURE Right     right heel injury    CAST APPLICATION      climbing, collar bone injury (7th grade)    OPEN REDUCTION          Medications:    Current Outpatient Medications on File Prior to Encounter   Medication Sig Dispense Refill    atorvastatin (LIPITOR) 40 MG Tab Take 40 mg by mouth every evening.      losartan (COZAAR) 50 MG Tab Take 50 mg by mouth every day.      Ascorbic Acid (VITAMIN C) 1000 MG Tab Take  by mouth.      aspirin (ASPIRIN 81) 81 MG EC tablet Take 1 Tablet by mouth every day. (Patient not taking: Reported on 2023) 100 Tablet 3     No current facility-administered medications on file prior to encounter.       Social History     Tobacco Use    Smoking status: Former     Packs/day: 1.00     Years: 4.00     Pack years: 4.00     Types: Cigarettes     Quit date: 1962     Years since quittin.1    Smokeless tobacco: Never    Tobacco comments:     quit    Substance Use Topics    Alcohol use: Yes     Alcohol/week: 0.6 oz     Types: 1 Shots of liquor per week     Comment: 1 per week        Family History   Problem Relation Age of Onset    Cancer Mother         skin cancer    Stroke Mother 92    Diabetes Mother     Hypertension Father     Heart Disease Father 55        father passed from heart attack    No Known Problems Brother     No Known Problems Brother     Cancer Brother         throat ca    Alcohol/Drug Brother         heavy drinker        Objective    Vitals:    BP (!) 172/90 (BP Location: Right arm, Patient Position: Sitting, BP Cuff Size: Adult)   Pulse 79   Temp 36.6 °C (97.9 °F) (Temporal)   Resp 18   Ht 1.829 m (6' 0.01\")   Wt 71.2 kg (156 lb 13.7 oz)   SpO2 98%   BMI 21.27 kg/m²     Physical Exam:    Appears well-developed and well-nourished. No distress.    Head -  Normocephalic .   Eyes - Pupils are equal. Conjunctivae normal. No scleral icterus.   Ears - normal hearing  Nodes - Bilateral cervical and axillary nodes " - right axilla moderately large  Neurological -   Alert and oriented.  Skin - Skin is warm and dry. No rash noted. Not diaphoretic. No erythema. No pallor. No jaundice   Psychiatric -  Normal mood and affect.    Labs:     Latest Reference Range & Units 11/12/21 08:56 01/11/23 11:41   WBC 4.8 - 10.8 K/uL 10.6 20.0 (H)   RBC 4.70 - 6.10 M/uL 4.18 (L) 3.43 (L)   Hemoglobin 14.0 - 18.0 g/dL 13.9 (L) 11.4 (L)   Hematocrit 42.0 - 52.0 % 42.5 35.4 (L)   MCV 81.4 - 97.8 fL 101.7 (H) 103.2 (H)   MCH 27.0 - 33.0 pg 33.3 (H) 33.2 (H)   MCHC 33.7 - 35.3 g/dL 32.7 (L) 32.2 (L)   RDW 35.9 - 50.0 fL 56.5 (H) 55.0 (H)   Platelet Count 164 - 446 K/uL 199 175   MPV 9.0 - 12.9 fL 11.5 11.2   Neutrophils-Polys 44.00 - 72.00 % 36.60 (L) 19.70 (L)   Neutrophils (Absolute) 1.82 - 7.42 K/uL 3.88 3.93   Lymphocytes 22.00 - 41.00 % 51.80 (H) 73.60 (H)   Lymphs (Absolute) 1.00 - 4.80 K/uL 5.49 (H) 14.72 (H)   Monocytes 0.00 - 13.40 % 9.80 5.10   Monos (Absolute) 0.00 - 0.85 K/uL 1.04 (H) 1.02 (H)      11/12/21 08:56 01/11/23 11:41   Smudge Cells Moderate Moderate      Latest Reference Range & Units 01/11/23 11:41   LDH Total 107 - 266 U/L 252      Latest Reference Range & Units 01/11/23 11:41   Immunoglobulin A 68 - 408 mg/dL 68   Immunoglobulin G 768 - 1632 mg/dL 802   Immunoglobulin M 35 - 263 mg/dL 177      Latest Reference Range & Units 01/11/23 11:41   Free Kappa Light Chains 3.30 - 19.40 mg/L 194.85 (H)   Free Lambda Light Chains 5.71 - 26.30 mg/L 10.39   Kappa-Lambda Ratio 0.26 - 1.65  18.75 (H)      01/11/23 11:43   IGHV Mutation Analysis by Sequencing Non Mutated       Assessment  Acting like CLL  Imp:    Visit Diagnosis:    1. Lymphocytosis        2. CLL (chronic lymphocytic leukemia) (HCC)  CBC WITH DIFFERENTIAL        Plan:  3 months with lab    Emile Hughes M.D.

## 2023-05-05 NOTE — PROGRESS NOTES
05/12/23    Subjective    Chief Complaint:  Follow up CLL    HPI:  82  male with probable CLL based on node biopsy flow cytology consistent with CLL oir possibly mantle cell. Path has been reviewed here and felt most consistent with CLL. He is CD38-, nmoromal chromosomes, IGHV non mutated.     ROS:    Constitutional: No weight loss  Skin: No rash or jaundice  HENT: No change in eyesight or hearing  Cardiovascular:No chest pain or arrythmia  Respiratory:No cough or SOB  GI:No nausea, vomiting, diarrhea, constipation  :No dysuria or frequency  Musculoskeletal:No bone or joint pain  Neuro:No sx's of neuropathy  Psych: No complaints    PMH:      No Known Allergies    Past Medical History:   Diagnosis Date    Cataract 01/22/2020    bilateral    Dental disorder 01/22/2020    upper and lower dentures    High cholesterol 01/22/2020    on med    Hyperlipidemia 1/9/2017    Pravastin x 10 yrs     Lupus (systemic lupus erythematosus) (HCC)     Pt reports only symptom was rash, on plaquenil for several yrs, stopped 2004        Past Surgical History:   Procedure Laterality Date    PB PARTIAL HIP REPLACEMENT Right 11/28/2020    Procedure: HEMIARTHROPLASTY, HIP;  Surgeon: Kendrick Royal M.D.;  Location: SURGERY [facility];  Service: Orthopedics    CATARACT PHACO WITH IOL Left 2/11/2020    Procedure: PHACOEMULSIFICATION, CATARACT, WITH IOL INSERTION;  Surgeon: Kiran Jang M.D.;  Location: SURGERY SAME DAY [facility];  Service: Ophthalmology    CATARACT PHACO WITH IOL Right 1/28/2020    Procedure: PHACOEMULSIFICATION, CATARACT, WITH IOL INSERTION;  Surgeon: Kiran Jang M.D.;  Location: SURGERY SAME DAY [facility];  Service: Ophthalmology    OTHER SURGICAL PROCEDURE Right 1972    right heel injury    CAST APPLICATION      climbing, collar bone injury (7th grade)    OPEN REDUCTION          Medications:    Current Outpatient Medications on File Prior to Encounter   Medication Sig Dispense Refill     atorvastatin (LIPITOR) 40 MG Tab Take 40 mg by mouth every evening.      losartan (COZAAR) 50 MG Tab Take 50 mg by mouth every day.      Ascorbic Acid (VITAMIN C) 1000 MG Tab Take  by mouth.      aspirin (ASPIRIN 81) 81 MG EC tablet Take 1 Tablet by mouth every day. (Patient not taking: Reported on 2023) 100 Tablet 3     No current facility-administered medications on file prior to encounter.       Social History     Tobacco Use    Smoking status: Former     Packs/day: 1.00     Years: 4.00     Pack years: 4.00     Types: Cigarettes     Quit date: 1962     Years since quittin.4    Smokeless tobacco: Never    Tobacco comments:     quit    Vaping Use    Vaping Use: Never used   Substance Use Topics    Alcohol use: Yes     Alcohol/week: 0.6 oz     Types: 1 Shots of liquor per week     Comment: 1 per week        Family History   Problem Relation Age of Onset    Cancer Mother         skin cancer    Stroke Mother 92    Diabetes Mother     Hypertension Father     Heart Disease Father 55        father passed from heart attack    No Known Problems Brother     No Known Problems Brother     Cancer Brother         throat ca    Alcohol/Drug Brother         heavy drinker        Objective    Vitals:    BP (!) 150/70 (BP Location: Right arm, Patient Position: Sitting, BP Cuff Size: Adult)   Pulse 69   Temp 36.6 °C (97.8 °F) (Temporal)   Resp 15   Ht 1.829 m (6')   Wt 67.4 kg (148 lb 7.7 oz)   SpO2 95%   BMI 20.14 kg/m²     Physical Exam:    Appears well-developed and well-nourished. No distress.    Head -  Normocephalic .   Eyes - Pupils are equal. Conjunctivae normal. No scleral icterus.   Abdominal -Soft. No distension, tenderness, organomegaly or mass  Extremities-  No edema or tenderness.    Nodes - No submental, submandibular, preauricular, cervical, axillary or inguinal adenopathy.    Neurological -   Alert and oriented.  Skin - Skin is warm and dry. No rash noted. Not diaphoretic. No erythema. No  pallor. No jaundice   Psychiatric -  Normal mood and affect.    Labs:     Latest Reference Range & Units 11/12/21 08:56 01/11/23 11:41 05/10/23 10:56   WBC 4.8 - 10.8 K/uL 10.6 20.0 (H) 31.0 (H)   RBC 4.70 - 6.10 M/uL 4.18 (L) 3.43 (L) 3.12 (L)   Hemoglobin 14.0 - 18.0 g/dL 13.9 (L) 11.4 (L) 10.5 (L)   Hematocrit 42.0 - 52.0 % 42.5 35.4 (L) 32.8 (L)   MCV 81.4 - 97.8 fL 101.7 (H) 103.2 (H) 105.1 (H)   MCH 27.0 - 33.0 pg 33.3 (H) 33.2 (H) 33.7 (H)   MCHC 33.7 - 35.3 g/dL 32.7 (L) 32.2 (L) 32.0 (L)   RDW 35.9 - 50.0 fL 56.5 (H) 55.0 (H) 54.4 (H)   Platelet Count 164 - 446 K/uL 199 175 202   MPV 9.0 - 12.9 fL 11.5 11.2 11.4   Neutrophils-Polys 44.00 - 72.00 % 36.60 (L) 19.70 (L) 19.70 (L)   Neutrophils (Absolute) 1.82 - 7.42 K/uL 3.88 3.93 6.11   Lymphocytes 22.00 - 41.00 % 51.80 (H) 73.60 (H) 78.70 (H)   Lymphs (Absolute) 1.00 - 4.80 K/uL 5.49 (H) 14.72 (H) 24.40 (H)     Assessment    Imp:    Visit Diagnosis:    1. CLL (chronic lymphocytic leukemia) (HCC)  RETICULOCYTES COUNT    LDH    ANTIBODY SCREEN    HAPTOGLOBIN      2. Anemia, unspecified type  RETICULOCYTES COUNT    LDH    ANTIBODY SCREEN    HAPTOGLOBIN      3. Macrocytosis  RETICULOCYTES COUNT    LDH    ANTIBODY SCREEN    HAPTOGLOBIN        Plan:  Above lab  BMX  Return when marrow results available    Emile Hughes M.D.

## 2023-05-17 NOTE — OR NURSING
Call to pt today, and pt was unaware that he has an appt 5/18.  Pread done, pt to call office to verify appt.

## 2023-05-18 NOTE — OR NURSING
*This is a Running Note*    1253 Pt to PACU 23 from OR. Bedside report from RN.  Attached to monitoring, VSS, breathing is calm and unlabored, Patient is asleep currently. Pt has a dressing on left hip and CDI. Remains on room air.    1310 Pt on RA, and has had some water, tolerating well. Phase two criteria met.     1325 Rn went over discharge instructions with patients daughter. All questions answered.     1330 Patient meets discharge criteria. IV removed, Pt discharged via wheelchair by CCT.

## 2023-05-18 NOTE — PROCEDURES
DATE OF PROCEDURE: 05/18/23          PROCEDURE: Bone marrow biopsy with bone marrow aspiration.     REQUESTING PHYSICIAN: Emile Hughes    INDICATIONS: CLL (Chronic Lymphocytic Leukemia)    INFORMED CONSENT: Consent signed and on the chart.     DESCRIPTION: A time out was called. The patient was placed in the prone position. The left buttock was prepped and draped in a sterile fashion.  1 mL of 1% lidocaine was injected into the skin followed by 3 mL into the periosteum of the posterior ilium bone. Large-bore needle was used to obtain 5 mL of aspirate followed by 5 mL into a heparinized syringe for flow cytometry. This followed by a bone marrow biopsy using the Jamshidi needle.     SEDATION USED: 2mg IV versed and 50mcg IV fentanyl    Moderate sedation start time: 1238  Moderate sedation end time: 1249    ESTIMATED BLOOD LOSS: None

## 2023-05-18 NOTE — DISCHARGE INSTRUCTIONS
What to Expect Post Anesthesia    Rest and take it easy for the first 24 hours.  A responsible adult is recommended to remain with you during that time.  It is normal to feel sleepy.  We encourage you to not do anything that requires balance, judgment or coordination.    FOR 24 HOURS DO NOT:  Drive, operate machinery or run household appliances.  Drink beer or alcoholic beverages.  Make important decisions or sign legal documents.    To avoid nausea, slowly advance diet as tolerated, avoiding spicy or greasy foods for the first day.  Add more substantial food to your diet according to your provider's instructions.  Babies can be fed formula or breast milk as soon as they are hungry.  INCREASE FLUIDS AND FIBER TO AVOID CONSTIPATION.    MILD FLU-LIKE SYMPTOMS ARE NORMAL.  YOU MAY EXPERIENCE GENERALIZED MUSCLE ACHES, THROAT IRRITATION, HEADACHE AND/OR SOME NAUSEA.    If any questions arise, call your provider.  If your provider is not available, please feel free to call the Surgical Center at [phone].    MEDICATIONS: Resume taking daily medication.  Take prescribed pain medication with food.  If no medication is prescribed, you may take non-aspirin pain medication if needed.  PAIN MEDICATION CAN BE VERY CONSTIPATING.  Take a stool softener or laxative such as senokot, pericolace, or milk of magnesia if needed.

## 2023-06-02 NOTE — PROGRESS NOTES
"06/07/23    Subjective    Chief Complaint:  Follow up CLL    HPI:  82 male  with CD38+, IGHV non-mutated, normal chromosomal CLL. Recent BMX confirmed the diagnosis (Mantle cell had been in the differential based on a node bx). He is not hypogammaglobulinemic but has a markedly elevated kappa/lambda ratio.     ROS:    Constitutional: No weight loss  Skin: No rash or jaundice  HENT: No change in eyesight or hearing  Cardiovascular:No chest pain or arrythmia  Respiratory:No cough or SOB  GI:No nausea, vomiting, diarrhea, constipation  :No dysuria or frequency  Musculoskeletal:No bone or joint pain  Neuro:No sx's of neuropathy  Psych: No complaints    PMH:      No Known Allergies    Past Medical History:   Diagnosis Date    Arthritis 05/17/2023    general body    Breath shortness 05/17/2023    with exertion    Cancer (HCC) 05/17/2023    CLL    Cataract 05/17/2023    IOLOU    Dental disorder 05/17/2023    upper and lower dentures    High cholesterol 05/17/2023    medicated    Hyperlipidemia 01/09/2017    Pravastin x 10 yrs     Hypertension 05/17/2023    medicated    Lupus (systemic lupus erythematosus) ([facility])     Pt reports only symptom was rash, on plaquenil for several yrs, stopped 2004    Pain 05/17/2023    low back pain r/t fall 5/8/23    Stroke ([facility]) 05/17/2023    \"light\" left side brain 2021, no residual issues        Past Surgical History:   Procedure Laterality Date    NE DX BONE MARROW ASPIRATIONS Left 5/18/2023    Procedure: ASPIRATION, BONE MARROW - UREÑA;  Surgeon: Dimitris Olguin D.O.;  Location: SURGERY SAME DAY [facility];  Service: Orthopedics    NE DX BONE MARROW BIOPSIES Left 5/18/2023    Procedure: BIOPSY, BONE MARROW, USING NEEDLE OR TROCAR;  Surgeon: Dimitris Olguin D.O.;  Location: SURGERY SAME DAY [facility];  Service: Orthopedics    PB PARTIAL HIP REPLACEMENT Right 11/28/2020    Procedure: HEMIARTHROPLASTY, HIP;  Surgeon: Kendrick Royal M.D.;  Location: SURGERY [facility];  Service: " Orthopedics    CATARACT PHACO WITH IOL Left 2020    Procedure: PHACOEMULSIFICATION, CATARACT, WITH IOL INSERTION;  Surgeon: Kiran Jang M.D.;  Location: SURGERY SAME DAY [facility];  Service: Ophthalmology    CATARACT PHACO WITH IOL Right 2020    Procedure: PHACOEMULSIFICATION, CATARACT, WITH IOL INSERTION;  Surgeon: Kiran Jang M.D.;  Location: SURGERY SAME DAY [facility];  Service: Ophthalmology    OTHER SURGICAL PROCEDURE Right 1972    right heel injury    CAST APPLICATION      climbing, collar bone injury (7th grade)        Medications:    Current Outpatient Medications on File Prior to Encounter   Medication Sig Dispense Refill    atorvastatin (LIPITOR) 40 MG Tab Take 40 mg by mouth every evening.      losartan (COZAAR) 50 MG Tab Take 50 mg by mouth every day.      Ascorbic Acid (VITAMIN C) 1000 MG Tab Take  by mouth.       No current facility-administered medications on file prior to encounter.       Social History     Tobacco Use    Smoking status: Former     Packs/day: 1.00     Years: 4.00     Pack years: 4.00     Types: Cigarettes     Quit date: 1962     Years since quittin.4    Smokeless tobacco: Never    Tobacco comments:     quit    Vaping Use    Vaping Use: Never used   Substance Use Topics    Alcohol use: Yes     Alcohol/week: 0.6 oz     Types: 1 Standard drinks or equivalent per week     Comment: 1 per week        Family History   Problem Relation Age of Onset    Cancer Mother         skin cancer    Stroke Mother 92    Diabetes Mother     Hypertension Father     Heart Disease Father 55        father passed from heart attack    No Known Problems Brother     No Known Problems Brother     Cancer Brother         throat ca    Alcohol/Drug Brother         heavy drinker        Objective    Vitals:    BP (!) 158/62 (BP Location: Right arm, Patient Position: Sitting, BP Cuff Size: Small adult)   Pulse 74   Temp 36.8 °C (98.2 °F) (Temporal)   Resp 15   Ht 1.803 m (5'  "11\")   Wt 66.7 kg (146 lb 15 oz)   SpO2 95%   BMI 20.49 kg/m²     Physical Exam:    Appears well-developed and well-nourished. No distress.    Head -  Normocephalic .   Eyes - Pupils are equal. Conjunctivae normal. No scleral icterus.   Ears - normal hearing  Mouth - Throat: Oropharynx is clear and moist. No oropharyngeal exudate  Neck - Neck supple. No thyromegaly  Cardiovascular - Normal rate, regular rhythm, normal heart sounds and intact distal pulses. No  gallop, murmur or rub  Pulmonary - Normal breath sounds.  No wheeze, rales or rhonchi  Abdominal -Soft. No distension, tenderness, organomegaly or mass  Extremities-  No edema or tenderness.    Nodes - Impressive submental, submandibular, preauricular, cervical, axillary and inguinal adenopathy.    Neurological -   Alert and oriented.  Skin - Skin is warm and dry. No rash noted. Not diaphoretic. No erythema. No pallor. No jaundice   Psychiatric -  Normal mood and affect.    Labs:     Latest Reference Range & Units 05/10/23 10:56 05/18/23 12:08   WBC 4.8 - 10.8 K/uL 31.0 (H) 26.9 (H)   RBC 4.70 - 6.10 M/uL 3.12 (L) 3.01 (L)   Hemoglobin 14.0 - 18.0 g/dL 10.5 (L) 10.2 (L)   Hematocrit 42.0 - 52.0 % 32.8 (L) 31.0 (L)   MCV 81.4 - 97.8 fL 105.1 (H) 103.0 (H)   MCH 27.0 - 33.0 pg 33.7 (H) 33.9 (H)   MCHC 33.7 - 35.3 g/dL 32.0 (L) 32.9 (L)   RDW 35.9 - 50.0 fL 54.4 (H) 53.6 (H)   Platelet Count 164 - 446 K/uL 202 166   MPV 9.0 - 12.9 fL 11.4 11.2   Neutrophils-Polys 44.00 - 72.00 % 19.70 (L) 17.70 (L)   Neutrophils (Absolute) 1.82 - 7.42 K/uL 6.11 4.76   Lymphocytes 22.00 - 41.00 % 78.70 (H) 79.60 (H)   Lymphs (Absolute) 1.00 - 4.80 K/uL 24.40 (H) 21.41 (H)      Latest Reference Range & Units 01/11/23 11:41 05/12/23 11:47   LDH Total 107 - 266 U/L 252 261       Assessment  Lab stable but adenopathy progressive  Imp:    Visit Diagnosis:    1. CLL (chronic lymphocytic leukemia) (HCC)        2. Anemia, unspecified type        3. Macrocytosis          Plan:  Recheck " in 2 months - consider Acalabrutinib    Emile Hughes M.D.

## 2023-06-23 NOTE — TELEPHONE ENCOUNTER
Calista RN with Dr. Deluna's office, from [facility] called with critical CBC values at:    WBC = 40.12  Hgb = 10.7  Hematocrit = 31.8  RBC = 3.10  MCV = 102.6  MCH = 34.5  Ig = 0.16  CO2 = 23  Cholesterol = 110  HDL = 25    Dr. Hughes notified of critical lab. [facility].  Call back number is [phone], ask to speak with Dr. Deluna's office.    Faxed to Dr. Deluna, per Calista's request, last office visit note from 6/7/23.

## 2023-07-17 NOTE — PROGRESS NOTES
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 Has lost a few pounds. Several episodes of dark, liquid stools.     ROS:    Constitutional: No weight loss  Skin: No rash or jaundice  HENT: No change in eyesight or hearing  Cardiovascular:No chest pain or arrythmia  Respiratory:No cough or SOB  GI:No nausea, vomiting, diarrhea, constipation  :No dysuria or frequency  Musculoskeletal:No bone or joint pain  Neuro:No sx's of neuropathy  Psych: No complaints    PMH:      No Known Allergies    Past Medical History:   Diagnosis Date    Arthritis 05/17/2023    general body    Breath shortness 05/17/2023    with exertion    Cancer (HCC) 05/17/2023    CLL    Cataract 05/17/2023    IOLOU    Dental disorder 05/17/2023    upper and lower dentures    High cholesterol 05/17/2023    medicated    Hyperlipidemia 01/09/2017    Pravastin x 10 yrs     Hypertension 05/17/2023    medicated    Lupus (systemic lupus erythematosus) (Formerly [facility])     Pt reports only symptom was rash, on plaquenil for several yrs, stopped 2004    Pain 05/17/2023    low back pain r/t fall 5/8/23    Stroke (Formerly [facility]) 05/17/2023    \"light\" left side brain 2021, no residual issues        Past Surgical History:   Procedure Laterality Date    OK DX BONE MARROW ASPIRATIONS Left 5/18/2023    Procedure: ASPIRATION, BONE MARROW - UREÑA;  Surgeon: Dimitris Olguin D.O.;  Location: SURGERY SAME DAY [facility];  Service: Orthopedics    OK DX BONE MARROW BIOPSIES Left 5/18/2023    Procedure: BIOPSY, BONE MARROW, USING NEEDLE OR TROCAR;  Surgeon: Dimitris Olguin D.O.;  Location: SURGERY SAME DAY [facility];  Service: Orthopedics    PB PARTIAL HIP REPLACEMENT Right 11/28/2020    Procedure: HEMIARTHROPLASTY, HIP;  Surgeon: Kendrick Royal M.D.;  Location: SURGERY [facility];  Service: Orthopedics    CATARACT PHACO WITH IOL Left 02/11/2020    Procedure: PHACOEMULSIFICATION, CATARACT, WITH IOL INSERTION; " " Surgeon: Kiran Jang M.D.;  Location: SURGERY SAME DAY [facility];  Service: Ophthalmology    CATARACT PHACO WITH IOL Right 2020    Procedure: PHACOEMULSIFICATION, CATARACT, WITH IOL INSERTION;  Surgeon: Kiran Jang M.D.;  Location: SURGERY SAME DAY [facility];  Service: Ophthalmology    OTHER SURGICAL PROCEDURE Right 1972    right heel injury    CAST APPLICATION      climbing, collar bone injury (7th grade)        Medications:    Current Outpatient Medications on File Prior to Encounter   Medication Sig Dispense Refill    atorvastatin (LIPITOR) 40 MG Tab Take 40 mg by mouth every evening.      losartan (COZAAR) 50 MG Tab Take 50 mg by mouth every day.      Ascorbic Acid (VITAMIN C) 1000 MG Tab Take  by mouth.       No current facility-administered medications on file prior to encounter.       Social History     Tobacco Use    Smoking status: Former     Packs/day: 1.00     Years: 4.00     Pack years: 4.00     Types: Cigarettes     Quit date: 1962     Years since quittin.6    Smokeless tobacco: Never    Tobacco comments:     quit    Substance Use Topics    Alcohol use: Yes     Alcohol/week: 0.6 oz     Types: 1 Standard drinks or equivalent per week     Comment: 1 per week        Family History   Problem Relation Age of Onset    Cancer Mother         skin cancer    Stroke Mother 92    Diabetes Mother     Hypertension Father     Heart Disease Father 55        father passed from heart attack    No Known Problems Brother     No Known Problems Brother     Cancer Brother         throat ca    Alcohol/Drug Brother         heavy drinker        Objective    Vitals:    BP (!) 178/80 (BP Location: Right arm, Patient Position: Sitting, BP Cuff Size: Adult)   Pulse 62   Temp 37.2 °C (98.9 °F) (Temporal)   Resp 12   Ht 1.803 m (5' 11\")   Wt 65.1 kg (143 lb 8.3 oz)   SpO2 97%   BMI 20.02 kg/m²     Physical Exam:    Appears well-developed and well-nourished. No distress.    Head -  " Normocephalic .   Eyes - Pupils are equal. Conjunctivae normal. No scleral icterus.   Ears - normal hearing  Abdominal -Soft. No distension, tenderness, organomegaly or mass  Extremities-  No edema or tenderness.    Nodes - Large right axillary nodes. Bilateral cervicial, inguinal,iliac nodes   Neurological -   Alert and oriented.  Skin - Skin is warm and dry. No rash noted. Not diaphoretic. No erythema. No pallor. No jaundice   Psychiatric -  Normal mood and affect.    Labs:     Latest Reference Range & Units 01/11/23 11:41 05/10/23 10:56 05/18/23 12:08 08/01/23 09:51   WBC 4.8 - 10.8 K/uL 20.0 (H) 31.0 (H) 26.9 (H) 38.5 (H)   RBC 4.70 - 6.10 M/uL 3.43 (L) 3.12 (L) 3.01 (L) 3.13 (L)   Hemoglobin 14.0 - 18.0 g/dL 11.4 (L) 10.5 (L) 10.2 (L) 10.4 (L)   Hematocrit 42.0 - 52.0 % 35.4 (L) 32.8 (L) 31.0 (L) 33.4 (L)   MCV 81.4 - 97.8 fL 103.2 (H) 105.1 (H) 103.0 (H) 106.7 (H)   MCH 27.0 - 33.0 pg 33.2 (H) 33.7 (H) 33.9 (H) 33.2 (H)   MCHC 32.3 - 36.5 g/dL 32.2 (L) 32.0 (L) 32.9 (L) 31.1 (L)   RDW 35.9 - 50.0 fL 55.0 (H) 54.4 (H) 53.6 (H) 51.9 (H)   Platelet Count 164 - 446 K/uL 175 202 166 209   MPV 9.0 - 12.9 fL 11.2 11.4 11.2 11.1   Neutrophils-Polys 44.00 - 72.00 % 19.70 (L) 19.70 (L) 17.70 (L) 14.60 (L)   Neutrophils (Absolute) 1.82 - 7.42 K/uL 3.93 6.11 4.76 5.62   Lymphocytes 22.00 - 41.00 % 73.60 (H) 78.70 (H) 79.60 (H) 84.60 (H)   Lymphs (Absolute) 1.00 - 4.80 K/uL 14.72 (H) 24.40 (H) 21.41 (H) 32.57 (H)     Assessment    Imp:    Visit Diagnosis:    1. CLL (chronic lymphocytic leukemia) (HCC)        2. Anemia, unspecified type        3. Macrocytosis            Plan:  [facility] stoools  Recheck here in 3 months    Emile Hguhes M.D.

## 2023-10-25 NOTE — PROGRESS NOTES
"11/06/23    Subjective    Chief Complaint:  Follow up CLL    HPI:  83 male with CLL. Chromosomes are normal, IGHV non mutated, CD38+. WBC is up but mild anemia unchanged and platelet count normal. Feels well w/o complaints.    ROS:    Constitutional: No weight loss  Skin: No rash or jaundice  HENT: No change in eyesight or hearing  Cardiovascular:No chest pain or arrythmia  Respiratory:No cough or SOB  GI:No nausea, vomiting, diarrhea, constipation  :No dysuria or frequency  Musculoskeletal:No bone or joint pain  Neuro:No sx's of neuropathy  Psych: No complaints    PMH:      No Known Allergies    Past Medical History:   Diagnosis Date    Arthritis 05/17/2023    general body    Breath shortness 05/17/2023    with exertion    Cancer (HCC) 05/17/2023    CLL    Cataract 05/17/2023    IOLOU    Dental disorder 05/17/2023    upper and lower dentures    High cholesterol 05/17/2023    medicated    Hyperlipidemia 01/09/2017    Pravastin x 10 yrs     Hypertension 05/17/2023    medicated    Lupus (systemic lupus erythematosus) ([facility])     Pt reports only symptom was rash, on plaquenil for several yrs, stopped 2004    Pain 05/17/2023    low back pain r/t fall 5/8/23    Stroke ([facility]) 05/17/2023    \"light\" left side brain 2021, no residual issues        Past Surgical History:   Procedure Laterality Date    [place] DX BONE MARROW ASPIRATIONS Left 5/18/2023    Procedure: ASPIRATION, BONE MARROW - UREÑA;  Surgeon: Dimitris Olguin D.O.;  Location: SURGERY SAME DAY [facility];  Service: Orthopedics    [place] DX BONE MARROW BIOPSIES Left 5/18/2023    Procedure: BIOPSY, BONE MARROW, USING NEEDLE OR TROCAR;  Surgeon: Dimitris Olguin D.O.;  Location: SURGERY SAME DAY [facility];  Service: Orthopedics    PB PARTIAL HIP REPLACEMENT Right 11/28/2020    Procedure: HEMIARTHROPLASTY, HIP;  Surgeon: Kendrick Royal M.D.;  Location: SURGERY [facility];  Service: Orthopedics    CATARACT PHACO WITH IOL Left 02/11/2020    Procedure: PHACOEMULSIFICATION, " CATARACT, WITH IOL INSERTION;  Surgeon: Kiran Jang M.D.;  Location: SURGERY SAME DAY [facility];  Service: Ophthalmology    CATARACT PHACO WITH IOL Right 2020    Procedure: PHACOEMULSIFICATION, CATARACT, WITH IOL INSERTION;  Surgeon: Kiran Jang M.D.;  Location: SURGERY SAME DAY [facility];  Service: Ophthalmology    OTHER SURGICAL PROCEDURE Right 1972    right heel injury    CAST APPLICATION      climbing, collar bone injury (7th grade)        Medications:    Current Outpatient Medications on File Prior to Visit   Medication Sig Dispense Refill    atorvastatin (LIPITOR) 40 MG Tab Take 40 mg by mouth every evening.      losartan (COZAAR) 50 MG Tab Take 50 mg by mouth every day.      Ascorbic Acid (VITAMIN C) 1000 MG Tab Take  by mouth.       No current facility-administered medications on file prior to visit.       Social History     Tobacco Use    Smoking status: Former     Current packs/day: 0.00     Average packs/day: 1 pack/day for 4.0 years (4.0 ttl pk-yrs)     Types: Cigarettes     Start date: 1958     Quit date: 1962     Years since quittin.8    Smokeless tobacco: Never    Tobacco comments:     quit    Substance Use Topics    Alcohol use: Yes     Alcohol/week: 0.6 oz     Types: 1 Standard drinks or equivalent per week     Comment: 1 per week        Family History   Problem Relation Age of Onset    Cancer Mother         skin cancer    Stroke Mother 92    Diabetes Mother     Hypertension Father     Heart Disease Father 55        father passed from heart attack    No Known Problems Brother     No Known Problems Brother     Cancer Brother         throat ca    Alcohol/Drug Brother         heavy drinker        Objective    Vitals:    There were no vitals taken for this visit.    Physical Exam:    Appears well-developed and well-nourished. No distress.    Head -  Normocephalic .   Eyes - Pupils are equal. Conjunctivae normal. No scleral icterus.   Ears - normal  hearing  Abdominal -Soft. No distension, tenderness, organomegaly or mass  Extremities-  No edema or tenderness.    Nodes - Multiple enlarged cervical, axillary (R>L) and inguinal nodes..    Neurological -   Alert and oriented.  Skin - Skin is warm and dry. No rash noted. Not diaphoretic. No erythema. No pallor. No jaundice   Psychiatric -  Normal mood and affect.    Labs:     Latest Reference Range & Units 08/01/23 09:51 10/02/23 09:11 11/02/23 10:25   WBC 4.8 - 10.8 K/uL 38.5 (H) 41.0 (H) 41.0 (H)   RBC 4.70 - 6.10 M/uL 3.13 (L) 3.10 (L) 2.90 (L)   Hemoglobin 14.0 - 18.0 g/dL 10.4 (L) 10.4 (L) 9.7 (L)   Hematocrit 42.0 - 52.0 % 33.4 (L) 32.9 (L) 30.5 (L)   MCV 81.4 - 97.8 fL 106.7 (H) 106.1 (H) 105.2 (H)   MCH 27.0 - 33.0 pg 33.2 (H) 33.5 (H) 33.4 (H)   MCHC 32.3 - 36.5 g/dL 31.1 (L) 31.6 (L) 31.8 (L)   RDW 35.9 - 50.0 fL 51.9 (H) 58.6 (H) 56.0 (H)   Platelet Count 164 - 446 K/uL 209 238 189   MPV 9.0 - 12.9 fL 11.1 10.8 11.0   Neutrophils-Polys 44.00 - 72.00 % 14.60 (L) 14.40 (L) 9.60 (L)   Neutrophils (Absolute) 1.82 - 7.42 K/uL 5.62 5.90 3.94   Lymphocytes 22.00 - 41.00 % 84.60 (H) 79.30 (H) 88.00 (H)   Lymphs (Absolute) 1.00 - 4.80 K/uL 32.57 (H) 32.51 (H) 36.08 (H)   Monocytes 0.00 - 13.40 % 0.80 5.40 2.40   Monos (Absolute) 0.00 - 0.85 K/uL 0.31 2.21 (H) 0.98 (H)     Assessment  Only indications for treatment would be his adenopathy and mild anemia.  Imp:    Visit Diagnosis:    1. CLL (chronic lymphocytic leukemia) (HCC)  CBC WITH DIFFERENTIAL            Plan:  Discussed the possibility of Zanubrutinib in the future but no rush to start  3 months with lab    Emile Hughes M.D.

## 2024-01-30 NOTE — PROGRESS NOTES
"02/06/24    Subjective    Chief Complaint:  Follow up CLL    HPI:  83 male with CLL. IGHV not mutated. Chromosomes all normal. Has been chronically anemic.     ROS:    Constitutional: No weight loss  Skin: No rash or jaundice  HENT: No change in eyesight or hearing  Cardiovascular:No chest pain or arrythmia  Respiratory:No cough or SOB  GI:No nausea, vomiting, diarrhea, constipation  :No dysuria or frequency  Musculoskeletal:No bone or joint pain  Neuro:No sx's of neuropathy  Psych: No complaints    PMH:      No Known Allergies    Past Medical History:   Diagnosis Date    Arthritis 05/17/2023    general body    Breath shortness 05/17/2023    with exertion    Cancer ([facility]) 05/17/2023    CLL    Cataract 05/17/2023    IOLOU    Dental disorder 05/17/2023    upper and lower dentures    High cholesterol 05/17/2023    medicated    Hyperlipidemia 01/09/2017    Pravastin x 10 yrs     Hypertension 05/17/2023    medicated    Lupus (systemic lupus erythematosus) (Former[facility])     Pt reports only symptom was rash, on plaquenil for several yrs, stopped 2004    Pain 05/17/2023    low back pain r/t fall 5/8/23    Stroke ([facility]) 05/17/2023    \"light\" left side brain 2021, no residual issues        Past Surgical History:   Procedure Laterality Date    [place]Y DX BONE MARROW ASPIRATIONS Left 5/18/2023    Procedure: ASPIRATION, BONE MARROW - UREÑA;  Surgeon: Dimitris Olguin D.O.;  Location: SURGERY SAME DAY [facility];  Service: Orthopedics    [place] DX BONE MARROW BIOPSIES Left 5/18/2023    Procedure: BIOPSY, BONE MARROW, USING NEEDLE OR TROCAR;  Surgeon: Dimirtis Olguin D.O.;  Location: SURGERY SAME DAY [facility];  Service: Orthopedics    PB PARTIAL HIP REPLACEMENT Right 11/28/2020    Procedure: HEMIARTHROPLASTY, HIP;  Surgeon: Kendrick Royal M.D.;  Location: [facility];  Service: Orthopedics    CATARACT PHACO WITH IOL Left 02/11/2020    Procedure: PHACOEMULSIFICATION, CATARACT, WITH IOL INSERTION;  Surgeon: Kiran Jang M.D.;  Location: " SURGERY SAME DAY [facility];  Service: Ophthalmology    CATARACT PHACO WITH IOL Right 2020    Procedure: PHACOEMULSIFICATION, CATARACT, WITH IOL INSERTION;  Surgeon: Kiran Jang M.D.;  Location: SURGERY SAME DAY [facility];  Service: Ophthalmology    OTHER SURGICAL PROCEDURE Right     right heel injury    CAST APPLICATION      climbing, collar bone injury (7th grade)        Medications:    Current Outpatient Medications on File Prior to Visit   Medication Sig Dispense Refill    atorvastatin (LIPITOR) 40 MG Tab Take 40 mg by mouth every evening.      losartan (COZAAR) 50 MG Tab Take 50 mg by mouth every day.      Ascorbic Acid (VITAMIN C) 1000 MG Tab Take  by mouth.       No current facility-administered medications on file prior to visit.       Social History     Tobacco Use    Smoking status: Former     Current packs/day: 0.00     Average packs/day: 1 pack/day for 4.0 years (4.0 ttl pk-yrs)     Types: Cigarettes     Start date: 1958     Quit date: 1962     Years since quittin.1    Smokeless tobacco: Never    Tobacco comments:     quit    Substance Use Topics    Alcohol use: Yes     Alcohol/week: 0.6 oz     Types: 1 Standard drinks or equivalent per week     Comment: 1 per week        Family History   Problem Relation Age of Onset    Cancer Mother         skin cancer    Stroke Mother 92    Diabetes Mother     Hypertension Father     Heart Disease Father 55        father passed from heart attack    No Known Problems Brother     No Known Problems Brother     Cancer Brother         throat ca    Alcohol/Drug Brother         heavy drinker        Objective    Vitals:    There were no vitals taken for this visit.    Physical Exam:    Appears well-developed and well-nourished. No distress.    Head -  Normocephalic .   Eyes - Pupils are equal. Conjunctivae normal. No scleral icterus.   Ears - normal hearing  Abdominal -Soft. No distension, tenderness, organomegaly or mass  Extremities-   No edema or tenderness.    Nodes - No submental, submandibular, preauricular, cervical, axillary or inguinal adenopathy.    Neurological -   Alert and oriented.  Skin - Skin is warm and dry. No rash noted. Not diaphoretic. No erythema. No pallor. No jaundice   Psychiatric -  Normal mood and affect.    Labs:      Assessment    Imp:    Visit Diagnosis:    1. CLL (chronic lymphocytic leukemia) (HCC)        2. Anemia, unspecified type              Plan:      Emile Hughes M.D.

## 2024-02-05 ENCOUNTER — TELEPHONE (OUTPATIENT)
Dept: HEMATOLOGY ONCOLOGY | Facility: MEDICAL CENTER | Age: 84
End: 2024-02-05

## 2024-02-06 ENCOUNTER — APPOINTMENT (OUTPATIENT)
Dept: HEMATOLOGY ONCOLOGY | Facility: MEDICAL CENTER | Age: 84
End: 2024-02-06
Payer: COMMERCIAL

## 2025-04-28 NOTE — OR NURSING
[id number]   I attest my time as attending is greater than 50% of the total combined time spent on qualifying patient care activities by the PA/NP and attending.

## (undated) DEVICE — Device

## (undated) DEVICE — TIP MINI FLARE 30 DEGREE OZIL - (6/BX)

## (undated) DEVICE — GLOVE BIOGEL PI ORTHO SZ 8 PF LF (40PR/BX)

## (undated) DEVICE — LACTATED RINGERS INJ 1000 ML - (14EA/CA 60CA/PF)

## (undated) DEVICE — KIT, EYE POST-OP FOR PHACOS

## (undated) DEVICE — SENSOR SPO2 NEO LNCS ADHESIVE (20/BX) SEE USER NOTES

## (undated) DEVICE — ELECTRODE 850 FOAM ADHESIVE - HYDROGEL RADIOTRNSPRNT (50/PK)

## (undated) DEVICE — PACK TOTAL HIP - (1/CA)

## (undated) DEVICE — TUBE CONNECTING SUCTION - CLEAR PLASTIC STERILE 72 IN (50EA/CA)

## (undated) DEVICE — SLEEVE, VASO, THIGH, MED

## (undated) DEVICE — SUTURE 3-0 MONOCRYL PLUS PS-1 - 27 INCH (36/BX)

## (undated) DEVICE — SOD. CHL 10CC SYRINGE PREFILL - W/10 CC (30/BX)

## (undated) DEVICE — SODIUM CHL IRRIGATION 0.9% 1000ML (12EA/CA)

## (undated) DEVICE — CANNULA INJECTION 27G (EYE) - 10/BX ALCON

## (undated) DEVICE — KIT EVACUATER 3 SPRING PVC LF 1/8 DRAIN SIZE (10EA/CA)"

## (undated) DEVICE — HEAD HOLDER JUNIOR/ADULT

## (undated) DEVICE — TIP POLYMER I&A

## (undated) DEVICE — SUCTION INSTRUMENT YANKAUER BULBOUS TIP W/O VENT (50EA/CA)

## (undated) DEVICE — KIT  I.V. START (100EA/CA)

## (undated) DEVICE — NEEDLE FILTER ASPIRATION 18 GA X 1 1/2 IN (100EA/BX)

## (undated) DEVICE — SPONGE GAUZESTER 4 X 4 4PLY - (128PK/CA)

## (undated) DEVICE — SENSOR OXIMETER ADULT SPO2 RD SET (20EA/BX)

## (undated) DEVICE — CANISTER SUCTION RIGID RED 1500CC (40EA/CA)

## (undated) DEVICE — GLOVE BIOGEL M SZ 8 SURGICAL PF LTX - (50/BX 4BX/CA)

## (undated) DEVICE — KIT ANESTHESIA W/CIRCUIT & 3/LT BAG W/FILTER (20EA/CA)

## (undated) DEVICE — SET LEADWIRE 5 LEAD BEDSIDE DISPOSABLE ECG (1SET OF 5/EA)

## (undated) DEVICE — SYRINGE 3 CC 22 GA X 1-1/2 - NDL SAFETY (50/BX 8BX/CA)

## (undated) DEVICE — GOWN WARMING STANDARD FLEX - (30/CA)

## (undated) DEVICE — NEPTUNE 4 PORT MANIFOLD - (20/PK)

## (undated) DEVICE — LENS/HOOD FOR SPACESUIT - (32/PK) PEEL AWAY FACE

## (undated) DEVICE — SUTURE 2-0 VICRYL PLUS CT-1 - 8 X 18 INCH(12/BX)

## (undated) DEVICE — TIP INTPLS HFLO ML ORFC BTRY - (12/CS)  FOR SURGILAV

## (undated) DEVICE — BLADE 3.0MM ANGLED DBL BEVEL WITH SAFETY (10/CA)

## (undated) DEVICE — WATER IRRIGATION STERILE 1000ML (12EA/CA)

## (undated) DEVICE — TUBING CLEARLINK DUO-VENT - C-FLO (48EA/CA)

## (undated) DEVICE — SET EXTENSION WITH 2 PORTS (48EA/CA) ***PART #2C8610 IS A SUBSTITUTE*****

## (undated) DEVICE — SUTURE GENERAL

## (undated) DEVICE — CANISTER SUCTION 3000ML MECHANICAL FILTER AUTO SHUTOFF MEDI-VAC NONSTERILE LF DISP  (40EA/CA)

## (undated) DEVICE — SODIUM CHL. IRRIGATION 0.9% 3000ML (4EA/CA 65CA/PF)

## (undated) DEVICE — NEEDLE CYSTOTOME OPTH VSTC  0.4MM X 16MM - (10/CA)

## (undated) DEVICE — GLOVE BIOGEL INDICATOR SZ 8 SURGICAL PF LTX - (50/BX 4BX/CA)

## (undated) DEVICE — PROTECTOR ULNA NERVE - (36PR/CA)

## (undated) DEVICE — DRAPE SURG STERI-DRAPE 7X11OD - (40EA/CA)

## (undated) DEVICE — STERI STRIP COMPOUND BENZOIN - TINCTURE 0.6ML WITH APPLICATOR (40EA/BX)

## (undated) DEVICE — CATHETER IV 20 GA X 1-1/4 ---SURG.& SDS ONLY--- (50EA/BX)

## (undated) DEVICE — CONNECTOR Y TBG CRTY 5 IN 1 STERILE (50EA/CA)

## (undated) DEVICE — KNIFE MICROSURGICAL 15 DEGREE GREEN

## (undated) DEVICE — CHLORAPREP 26 ML APPLICATOR - ORANGE TINT(25/CA)

## (undated) DEVICE — PACK BASIC CATARACT - (4/BX)

## (undated) DEVICE — MASK ANESTHESIA ADULT  - (100/CA)

## (undated) DEVICE — TOWEL STOP TIMEOUT SAFETY FLAG (40EA/CA)

## (undated) DEVICE — CUSHION EAR E-Z WRAP NASAL CANNULA - (25/CA)

## (undated) DEVICE — BANDAID SHEER STRIP 3/4 IN (100EA/BX 12BX/CA)

## (undated) DEVICE — SUTURE 1 VICRYL PLUS CTX - 8 X 18 INCH (12/BX)

## (undated) DEVICE — CLOSURE SKIN STRIP 1/2 X 4 IN - (STERI STRIP) (50/BX 4BX/CA)

## (undated) DEVICE — ELECTRODE DUAL RETURN W/ CORD - (50/PK)

## (undated) DEVICE — SYRINGE NON SAFETY 5 CC 20 GA X 1-1/2 IN (100/BX 4BX/CA)

## (undated) DEVICE — HANDPIECE 10FT INTPLS SCT PLS IRRIGATION HAND CONTROL SET (6/PK)